# Patient Record
Sex: FEMALE | Race: WHITE | NOT HISPANIC OR LATINO | Employment: OTHER | URBAN - METROPOLITAN AREA
[De-identification: names, ages, dates, MRNs, and addresses within clinical notes are randomized per-mention and may not be internally consistent; named-entity substitution may affect disease eponyms.]

---

## 2017-02-28 ENCOUNTER — HOSPITAL ENCOUNTER (OUTPATIENT)
Dept: LAB | Facility: MEDICAL CENTER | Age: 82
End: 2017-02-28
Attending: FAMILY MEDICINE
Payer: COMMERCIAL

## 2017-02-28 LAB
25(OH)D3 SERPL-MCNC: 50 NG/ML (ref 30–100)
ALBUMIN SERPL BCP-MCNC: 4.1 G/DL (ref 3.2–4.9)
ALBUMIN/GLOB SERPL: 1.5 G/DL
ALP SERPL-CCNC: 49 U/L (ref 30–99)
ALT SERPL-CCNC: 11 U/L (ref 2–50)
AMORPHOUS CRYSTALS 1764: PRESENT /HPF
ANION GAP SERPL CALC-SCNC: 7 MMOL/L (ref 0–11.9)
APPEARANCE UR: ABNORMAL
AST SERPL-CCNC: 22 U/L (ref 12–45)
BASOPHILS # BLD AUTO: 0.02 K/UL (ref 0–0.12)
BASOPHILS NFR BLD AUTO: 0.3 % (ref 0–1.8)
BILIRUB SERPL-MCNC: 0.8 MG/DL (ref 0.1–1.5)
BILIRUB UR QL STRIP.AUTO: NEGATIVE
BUN SERPL-MCNC: 16 MG/DL (ref 8–22)
CALCIUM SERPL-MCNC: 9.8 MG/DL (ref 8.5–10.5)
CHLORIDE SERPL-SCNC: 100 MMOL/L (ref 96–112)
CHOLEST SERPL-MCNC: 169 MG/DL (ref 100–199)
CO2 SERPL-SCNC: 29 MMOL/L (ref 20–33)
COLOR UR AUTO: ABNORMAL
CREAT SERPL-MCNC: 0.75 MG/DL (ref 0.5–1.4)
EOSINOPHIL # BLD: 0.13 K/UL (ref 0–0.51)
EOSINOPHIL NFR BLD AUTO: 2.2 % (ref 0–6.9)
EPITHELIAL CELLS 1715: NORMAL /HPF
ERYTHROCYTE [DISTWIDTH] IN BLOOD BY AUTOMATED COUNT: 44.6 FL (ref 35.9–50)
GLOBULIN SER CALC-MCNC: 2.7 G/DL (ref 1.9–3.5)
GLUCOSE SERPL-MCNC: 98 MG/DL (ref 65–99)
GLUCOSE UR STRIP.AUTO-MCNC: NEGATIVE MG/DL
HCT VFR BLD AUTO: 49.2 % (ref 37–47)
HDLC SERPL-MCNC: 74 MG/DL
HGB BLD-MCNC: 16.4 G/DL (ref 12–16)
IMM GRANULOCYTES # BLD AUTO: 0.01 K/UL (ref 0–0.11)
IMM GRANULOCYTES NFR BLD AUTO: 0.2 % (ref 0–0.9)
KETONES UR STRIP.AUTO-MCNC: NEGATIVE MG/DL
LDLC SERPL CALC-MCNC: 82 MG/DL
LEUKOCYTE ESTERASE UR QL STRIP.AUTO: NEGATIVE
LYMPHOCYTES # BLD: 2.59 K/UL (ref 1–4.8)
LYMPHOCYTES NFR BLD AUTO: 43.7 % (ref 22–41)
MCH RBC QN AUTO: 30.1 PG (ref 27–33)
MCHC RBC AUTO-ENTMCNC: 33.3 G/DL (ref 33.6–35)
MCV RBC AUTO: 90.3 FL (ref 81.4–97.8)
MICRO URNS: ABNORMAL
MONOCYTES # BLD: 0.68 K/UL (ref 0–0.85)
MONOCYTES NFR BLD AUTO: 11.5 % (ref 0–13.4)
NEUTROPHILS # BLD: 2.5 K/UL (ref 2–7.15)
NEUTROPHILS NFR BLD AUTO: 42.1 % (ref 44–72)
NITRITE UR QL STRIP.AUTO: NEGATIVE
NRBC # BLD AUTO: 0 K/UL
NRBC BLD-RTO: 0 /100 WBC
PH UR: 7.5 [PH]
PLATELET # BLD AUTO: 192 K/UL (ref 164–446)
PMV BLD AUTO: 9.8 FL (ref 9–12.9)
POTASSIUM SERPL-SCNC: 4.1 MMOL/L (ref 3.6–5.5)
PROT SERPL-MCNC: 6.8 G/DL (ref 6–8.2)
PROT UR QL STRIP: NEGATIVE MG/DL
RBC # BLD AUTO: 5.45 M/UL (ref 4.2–5.4)
RBC UR QL AUTO: NEGATIVE
SODIUM SERPL-SCNC: 136 MMOL/L (ref 135–145)
SP GR UR STRIP.AUTO: 1.01
TRIGL SERPL-MCNC: 67 MG/DL (ref 0–149)
WBC # BLD AUTO: 5.9 K/UL (ref 4.8–10.8)

## 2017-02-28 PROCEDURE — 80053 COMPREHEN METABOLIC PANEL: CPT

## 2017-02-28 PROCEDURE — 81001 URINALYSIS AUTO W/SCOPE: CPT

## 2017-02-28 PROCEDURE — 82306 VITAMIN D 25 HYDROXY: CPT

## 2017-02-28 PROCEDURE — 85025 COMPLETE CBC W/AUTO DIFF WBC: CPT

## 2017-02-28 PROCEDURE — 36415 COLL VENOUS BLD VENIPUNCTURE: CPT

## 2017-02-28 PROCEDURE — 80061 LIPID PANEL: CPT

## 2017-03-01 ENCOUNTER — HOSPITAL ENCOUNTER (OUTPATIENT)
Facility: MEDICAL CENTER | Age: 82
End: 2017-03-01
Attending: FAMILY MEDICINE
Payer: COMMERCIAL

## 2017-03-01 LAB — HEMOCCULT STL QL: NEGATIVE

## 2017-03-01 PROCEDURE — 82272 OCCULT BLD FECES 1-3 TESTS: CPT

## 2017-08-02 ENCOUNTER — HOSPITAL ENCOUNTER (OUTPATIENT)
Dept: RADIOLOGY | Facility: MEDICAL CENTER | Age: 82
End: 2017-08-02
Attending: FAMILY MEDICINE
Payer: COMMERCIAL

## 2017-08-02 DIAGNOSIS — Z12.31 VISIT FOR SCREENING MAMMOGRAM: ICD-10-CM

## 2017-08-02 PROCEDURE — 77063 BREAST TOMOSYNTHESIS BI: CPT

## 2017-09-11 ENCOUNTER — HOSPITAL ENCOUNTER (OUTPATIENT)
Dept: LAB | Facility: MEDICAL CENTER | Age: 82
End: 2017-09-11
Attending: FAMILY MEDICINE
Payer: COMMERCIAL

## 2017-09-11 PROCEDURE — 87086 URINE CULTURE/COLONY COUNT: CPT

## 2017-09-11 PROCEDURE — 80053 COMPREHEN METABOLIC PANEL: CPT

## 2017-09-11 PROCEDURE — 81001 URINALYSIS AUTO W/SCOPE: CPT

## 2017-09-11 PROCEDURE — 36415 COLL VENOUS BLD VENIPUNCTURE: CPT

## 2017-09-12 LAB
ALBUMIN SERPL BCP-MCNC: 4.4 G/DL (ref 3.2–4.9)
ALBUMIN/GLOB SERPL: 1.6 G/DL
ALP SERPL-CCNC: 47 U/L (ref 30–99)
ALT SERPL-CCNC: 18 U/L (ref 2–50)
ANION GAP SERPL CALC-SCNC: 11 MMOL/L (ref 0–11.9)
APPEARANCE UR: CLEAR
AST SERPL-CCNC: 30 U/L (ref 12–45)
BACTERIA #/AREA URNS HPF: NEGATIVE /HPF
BILIRUB SERPL-MCNC: 0.6 MG/DL (ref 0.1–1.5)
BILIRUB UR QL STRIP.AUTO: NEGATIVE
BUN SERPL-MCNC: 16 MG/DL (ref 8–22)
CALCIUM SERPL-MCNC: 9.4 MG/DL (ref 8.5–10.5)
CHLORIDE SERPL-SCNC: 87 MMOL/L (ref 96–112)
CO2 SERPL-SCNC: 22 MMOL/L (ref 20–33)
COLOR UR: YELLOW
CREAT SERPL-MCNC: 1.01 MG/DL (ref 0.5–1.4)
EPI CELLS #/AREA URNS HPF: ABNORMAL /HPF
GFR SERPL CREATININE-BSD FRML MDRD: 52 ML/MIN/1.73 M 2
GLOBULIN SER CALC-MCNC: 2.7 G/DL (ref 1.9–3.5)
GLUCOSE SERPL-MCNC: 106 MG/DL (ref 65–99)
GLUCOSE UR STRIP.AUTO-MCNC: NEGATIVE MG/DL
HYALINE CASTS #/AREA URNS LPF: ABNORMAL /LPF
KETONES UR STRIP.AUTO-MCNC: 100 MG/DL
LEUKOCYTE ESTERASE UR QL STRIP.AUTO: ABNORMAL
MICRO URNS: ABNORMAL
NITRITE UR QL STRIP.AUTO: NEGATIVE
PH UR STRIP.AUTO: 6 [PH]
POTASSIUM SERPL-SCNC: 4.2 MMOL/L (ref 3.6–5.5)
PROT SERPL-MCNC: 7.1 G/DL (ref 6–8.2)
PROT UR QL STRIP: 100 MG/DL
RBC UR QL AUTO: NEGATIVE
RENAL EPI CELLS #/AREA URNS HPF: ABNORMAL /HPF
SODIUM SERPL-SCNC: 120 MMOL/L (ref 135–145)
SP GR UR STRIP.AUTO: 1.01
UROBILINOGEN UR STRIP.AUTO-MCNC: NORMAL MG/DL
WBC #/AREA URNS HPF: ABNORMAL /HPF

## 2017-09-14 LAB
BACTERIA UR CULT: NORMAL
SIGNIFICANT IND 70042: NORMAL
SITE SITE: NORMAL
SOURCE SOURCE: NORMAL

## 2017-09-18 ENCOUNTER — HOSPITAL ENCOUNTER (OUTPATIENT)
Dept: LAB | Facility: MEDICAL CENTER | Age: 82
End: 2017-09-18
Attending: NURSE PRACTITIONER
Payer: COMMERCIAL

## 2017-09-18 PROCEDURE — 80048 BASIC METABOLIC PNL TOTAL CA: CPT

## 2017-09-18 PROCEDURE — 36415 COLL VENOUS BLD VENIPUNCTURE: CPT

## 2017-09-19 LAB
ANION GAP SERPL CALC-SCNC: 4 MMOL/L (ref 0–11.9)
BUN SERPL-MCNC: 14 MG/DL (ref 8–22)
CALCIUM SERPL-MCNC: 10.2 MG/DL (ref 8.5–10.5)
CHLORIDE SERPL-SCNC: 95 MMOL/L (ref 96–112)
CO2 SERPL-SCNC: 32 MMOL/L (ref 20–33)
CREAT SERPL-MCNC: 0.7 MG/DL (ref 0.5–1.4)
GFR SERPL CREATININE-BSD FRML MDRD: >60 ML/MIN/1.73 M 2
GLUCOSE SERPL-MCNC: 106 MG/DL (ref 65–99)
POTASSIUM SERPL-SCNC: 4.2 MMOL/L (ref 3.6–5.5)
SODIUM SERPL-SCNC: 131 MMOL/L (ref 135–145)

## 2017-12-28 ENCOUNTER — HOSPITAL ENCOUNTER (OUTPATIENT)
Dept: LAB | Facility: MEDICAL CENTER | Age: 82
End: 2017-12-28
Attending: NURSE PRACTITIONER
Payer: COMMERCIAL

## 2017-12-28 LAB
ALBUMIN SERPL BCP-MCNC: 4 G/DL (ref 3.2–4.9)
ALBUMIN/GLOB SERPL: 1.5 G/DL
ALP SERPL-CCNC: 51 U/L (ref 30–99)
ALT SERPL-CCNC: 14 U/L (ref 2–50)
ANION GAP SERPL CALC-SCNC: 4 MMOL/L (ref 0–11.9)
AST SERPL-CCNC: 33 U/L (ref 12–45)
BASOPHILS # BLD AUTO: 0.5 % (ref 0–1.8)
BASOPHILS # BLD: 0.03 K/UL (ref 0–0.12)
BILIRUB SERPL-MCNC: 0.9 MG/DL (ref 0.1–1.5)
BUN SERPL-MCNC: 12 MG/DL (ref 8–22)
CALCIUM SERPL-MCNC: 9.5 MG/DL (ref 8.5–10.5)
CHLORIDE SERPL-SCNC: 101 MMOL/L (ref 96–112)
CHOLEST SERPL-MCNC: 143 MG/DL (ref 100–199)
CO2 SERPL-SCNC: 29 MMOL/L (ref 20–33)
CREAT SERPL-MCNC: 0.66 MG/DL (ref 0.5–1.4)
EOSINOPHIL # BLD AUTO: 0.11 K/UL (ref 0–0.51)
EOSINOPHIL NFR BLD: 2 % (ref 0–6.9)
ERYTHROCYTE [DISTWIDTH] IN BLOOD BY AUTOMATED COUNT: 47.2 FL (ref 35.9–50)
GFR SERPL CREATININE-BSD FRML MDRD: >60 ML/MIN/1.73 M 2
GLOBULIN SER CALC-MCNC: 2.7 G/DL (ref 1.9–3.5)
GLUCOSE SERPL-MCNC: 89 MG/DL (ref 65–99)
HCT VFR BLD AUTO: 51.6 % (ref 37–47)
HDLC SERPL-MCNC: 69 MG/DL
HGB BLD-MCNC: 16.9 G/DL (ref 12–16)
IMM GRANULOCYTES # BLD AUTO: 0.01 K/UL (ref 0–0.11)
IMM GRANULOCYTES NFR BLD AUTO: 0.2 % (ref 0–0.9)
LDLC SERPL CALC-MCNC: 61 MG/DL
LYMPHOCYTES # BLD AUTO: 2.13 K/UL (ref 1–4.8)
LYMPHOCYTES NFR BLD: 39 % (ref 22–41)
MCH RBC QN AUTO: 30.9 PG (ref 27–33)
MCHC RBC AUTO-ENTMCNC: 32.8 G/DL (ref 33.6–35)
MCV RBC AUTO: 94.3 FL (ref 81.4–97.8)
MONOCYTES # BLD AUTO: 0.55 K/UL (ref 0–0.85)
MONOCYTES NFR BLD AUTO: 10.1 % (ref 0–13.4)
NEUTROPHILS # BLD AUTO: 2.63 K/UL (ref 2–7.15)
NEUTROPHILS NFR BLD: 48.2 % (ref 44–72)
NRBC # BLD AUTO: 0 K/UL
NRBC BLD-RTO: 0 /100 WBC
PLATELET # BLD AUTO: 154 K/UL (ref 164–446)
PMV BLD AUTO: 10.2 FL (ref 9–12.9)
POTASSIUM SERPL-SCNC: 4.1 MMOL/L (ref 3.6–5.5)
PROT SERPL-MCNC: 6.7 G/DL (ref 6–8.2)
RBC # BLD AUTO: 5.47 M/UL (ref 4.2–5.4)
SODIUM SERPL-SCNC: 134 MMOL/L (ref 135–145)
TRIGL SERPL-MCNC: 63 MG/DL (ref 0–149)
WBC # BLD AUTO: 5.5 K/UL (ref 4.8–10.8)

## 2017-12-28 PROCEDURE — 36415 COLL VENOUS BLD VENIPUNCTURE: CPT

## 2017-12-28 PROCEDURE — 80061 LIPID PANEL: CPT

## 2017-12-28 PROCEDURE — 85025 COMPLETE CBC W/AUTO DIFF WBC: CPT

## 2017-12-28 PROCEDURE — 80053 COMPREHEN METABOLIC PANEL: CPT

## 2018-04-26 ENCOUNTER — HOSPITAL ENCOUNTER (EMERGENCY)
Facility: MEDICAL CENTER | Age: 83
End: 2018-04-26
Attending: EMERGENCY MEDICINE
Payer: COMMERCIAL

## 2018-04-26 ENCOUNTER — OFFICE VISIT (OUTPATIENT)
Dept: URGENT CARE | Facility: CLINIC | Age: 83
End: 2018-04-26
Payer: COMMERCIAL

## 2018-04-26 VITALS
WEIGHT: 109 LBS | TEMPERATURE: 99.3 F | HEIGHT: 65 IN | DIASTOLIC BLOOD PRESSURE: 80 MMHG | HEART RATE: 92 BPM | RESPIRATION RATE: 18 BRPM | SYSTOLIC BLOOD PRESSURE: 140 MMHG | OXYGEN SATURATION: 92 % | BODY MASS INDEX: 18.16 KG/M2

## 2018-04-26 VITALS
TEMPERATURE: 99.6 F | BODY MASS INDEX: 18.26 KG/M2 | HEART RATE: 73 BPM | WEIGHT: 109.57 LBS | OXYGEN SATURATION: 93 % | SYSTOLIC BLOOD PRESSURE: 169 MMHG | HEIGHT: 65 IN | RESPIRATION RATE: 16 BRPM | DIASTOLIC BLOOD PRESSURE: 92 MMHG

## 2018-04-26 DIAGNOSIS — R04.0 EPISTAXIS: ICD-10-CM

## 2018-04-26 LAB
BASOPHILS # BLD AUTO: 0.3 % (ref 0–1.8)
BASOPHILS # BLD: 0.02 K/UL (ref 0–0.12)
EOSINOPHIL # BLD AUTO: 0.1 K/UL (ref 0–0.51)
EOSINOPHIL NFR BLD: 1.5 % (ref 0–6.9)
ERYTHROCYTE [DISTWIDTH] IN BLOOD BY AUTOMATED COUNT: 45.9 FL (ref 35.9–50)
HCT VFR BLD AUTO: 45.8 % (ref 37–47)
HGB BLD-MCNC: 15.8 G/DL (ref 12–16)
IMM GRANULOCYTES # BLD AUTO: 0.01 K/UL (ref 0–0.11)
IMM GRANULOCYTES NFR BLD AUTO: 0.1 % (ref 0–0.9)
INR PPP: 1.08 (ref 0.87–1.13)
LYMPHOCYTES # BLD AUTO: 2.1 K/UL (ref 1–4.8)
LYMPHOCYTES NFR BLD: 31.2 % (ref 22–41)
MCH RBC QN AUTO: 31 PG (ref 27–33)
MCHC RBC AUTO-ENTMCNC: 34.5 G/DL (ref 33.6–35)
MCV RBC AUTO: 89.8 FL (ref 81.4–97.8)
MONOCYTES # BLD AUTO: 0.76 K/UL (ref 0–0.85)
MONOCYTES NFR BLD AUTO: 11.3 % (ref 0–13.4)
NEUTROPHILS # BLD AUTO: 3.74 K/UL (ref 2–7.15)
NEUTROPHILS NFR BLD: 55.6 % (ref 44–72)
NRBC # BLD AUTO: 0 K/UL
NRBC BLD-RTO: 0 /100 WBC
PLATELET # BLD AUTO: 189 K/UL (ref 164–446)
PMV BLD AUTO: 9.7 FL (ref 9–12.9)
PROTHROMBIN TIME: 13.9 SEC (ref 12–14.6)
RBC # BLD AUTO: 5.1 M/UL (ref 4.2–5.4)
WBC # BLD AUTO: 6.7 K/UL (ref 4.8–10.8)

## 2018-04-26 PROCEDURE — 700101 HCHG RX REV CODE 250: Performed by: EMERGENCY MEDICINE

## 2018-04-26 PROCEDURE — 85025 COMPLETE CBC W/AUTO DIFF WBC: CPT

## 2018-04-26 PROCEDURE — 303620 HCHG EPISTAXIS CONTROL

## 2018-04-26 PROCEDURE — 99284 EMERGENCY DEPT VISIT MOD MDM: CPT

## 2018-04-26 PROCEDURE — 700102 HCHG RX REV CODE 250 W/ 637 OVERRIDE(OP): Performed by: EMERGENCY MEDICINE

## 2018-04-26 PROCEDURE — 99214 OFFICE O/P EST MOD 30 MIN: CPT | Performed by: NURSE PRACTITIONER

## 2018-04-26 PROCEDURE — 85610 PROTHROMBIN TIME: CPT

## 2018-04-26 RX ORDER — AZITHROMYCIN 250 MG/1
TABLET, FILM COATED ORAL
Qty: 6 TAB | Refills: 0 | Status: ON HOLD | OUTPATIENT
Start: 2018-04-26 | End: 2018-05-23

## 2018-04-26 RX ORDER — ASPIRIN 81 MG/1
81 TABLET, CHEWABLE ORAL DAILY
Status: ON HOLD | COMMUNITY
End: 2018-05-23

## 2018-04-26 RX ORDER — OXYMETAZOLINE HYDROCHLORIDE 0.05 G/100ML
2 SPRAY NASAL ONCE
Status: COMPLETED | OUTPATIENT
Start: 2018-04-26 | End: 2018-04-26

## 2018-04-26 RX ORDER — LOVASTATIN 10 MG/1
10 TABLET ORAL NIGHTLY
COMMUNITY

## 2018-04-26 RX ORDER — BENAZEPRIL HYDROCHLORIDE 20 MG/1
20 TABLET ORAL DAILY
Status: ON HOLD | COMMUNITY
End: 2018-05-23

## 2018-04-26 RX ORDER — RISEDRONATE SODIUM 35 MG/1
35 TABLET, FILM COATED ORAL
COMMUNITY
End: 2019-08-14

## 2018-04-26 RX ORDER — EPINEPHRINE NASAL SOLUTION 1 MG/ML
SOLUTION NASAL
Status: DISCONTINUED
Start: 2018-04-26 | End: 2018-04-26 | Stop reason: HOSPADM

## 2018-04-26 RX ADMIN — LIDOCAINE HYDROCHLORIDE 2 %: 20 JELLY TOPICAL at 13:08

## 2018-04-26 RX ADMIN — OXYMETAZOLINE HYDROCHLORIDE 2 SPRAY: 5 SPRAY NASAL at 13:08

## 2018-04-26 ASSESSMENT — PAIN SCALES - GENERAL: PAINLEVEL_OUTOF10: 0

## 2018-04-26 ASSESSMENT — ENCOUNTER SYMPTOMS
FEVER: 0
VOMITING: 0
SHORTNESS OF BREATH: 0
EYE PAIN: 0
SORE THROAT: 0
DIZZINESS: 0
MYALGIAS: 0
NAUSEA: 0
CHILLS: 0

## 2018-04-26 NOTE — DISCHARGE INSTRUCTIONS
Nosebleed  Nosebleeds are common. A nosebleed can be caused by many things, including:  · Getting hit hard in the nose.  · Infections.  · Dryness in your nose.  · A dry climate.  · Medicines.  · Picking your nose.  · Your home heating and cooling systems.  HOME CARE   · Try controlling your nosebleed by pinching your nostrils gently. Do this for at least 10 minutes.  · Avoid blowing or sniffing your nose for a number of hours after having a nosebleed.  · Do not put gauze inside of your nose yourself. If your nose was packed by your doctor, try to keep the pack inside of your nose until your doctor removes it.  ¨ If a gauze pack was used and it starts to fall out, gently replace it or cut off the end of it.  ¨ If a balloon catheter was used to pack your nose, do not cut or remove it unless told by your doctor.  · Avoid lying down while you are having a nosebleed. Sit up and lean forward.  · Use a nasal spray decongestant to help with a nosebleed as told by your doctor.  · Do not use petroleum jelly or mineral oil in your nose. These can drip into your lungs.  · Keep your house humid by using:  ¨ Less air conditioning.  ¨ A humidifier.  · Aspirin and blood thinners make bleeding more likely. If you are prescribed these medicines and you have nosebleeds, ask your doctor if you should stop taking the medicines or adjust the dose. Do not stop medicines unless told by your doctor.  · Resume your normal activities as you are able. Avoid straining, lifting, or bending at your waist for several days.  · If your nosebleed was caused by dryness in your nose, use over-the-counter saline nasal spray or gel. If you must use a lubricant:  ¨ Choose one that is water-soluble.  ¨ Use it only as needed.  ¨ Do not use it within several hours of lying down.  · Keep all follow-up visits as told by your doctor. This is important.  GET HELP IF:  · You have a fever.  · You get frequent nosebleeds.  · You are getting nosebleeds more  often.  GET HELP RIGHT AWAY IF:  · Your nosebleed lasts longer than 20 minutes.  · Your nosebleed occurs after an injury to your face, and your nose looks crooked or broken.  · You have unusual bleeding from other parts of your body.  · You have unusual bruising on other parts of your body.  · You feel light-headed or dizzy.  · You become sweaty.  · You throw up (vomit) blood.  · You have a nosebleed after a head injury.  This information is not intended to replace advice given to you by your health care provider. Make sure you discuss any questions you have with your health care provider.  Document Released: 09/26/2009 Document Revised: 01/08/2016 Document Reviewed: 08/03/2015  [employer] Patient Education © 2017 [employer]      PACKING OUT IN 3-5 DAYS

## 2018-04-26 NOTE — ED PROVIDER NOTES
"ED Provider Note    CHIEF COMPLAINT  Chief Complaint   Patient presents with   • Epistaxis     Atraumatic rt sided epistaxis began at 0700 today. Pt was seen at , packing placed but bleeding never entirely stopped. Pt sent to ER from .        HPI  Diana Tristan is a 85 y.o. female who presents to emergency room today sent in by urgent care for uncontrolled bleeding. Patient states she woke up this morning with bleeding to her right nare around 7 PM denies trauma to the area. Went to urgent care clinic there unable to stop it still is symptomatic emergency room for evaluation. No nausea no vomiting no fever chills or change to bowel or bladder. She is not on any blood thinner other than aspirin patient states she will hold this.    REVIEW OF SYSTEMS  See HPI for further details. All other systems are negative.      PAST MEDICAL HISTORY  No past medical history on file.    FAMILY HISTORY  No family history on file.    SOCIAL HISTORY  Social History     Social History   • Marital status:      Spouse name: N/A   • Number of children: N/A   • Years of education: N/A     Social History Main Topics   • Smoking status: Former Smoker     Quit date: 1/26/2015   • Smokeless tobacco: Never Used   • Alcohol use Not on file   • Drug use: Unknown   • Sexual activity: Not on file     Other Topics Concern   • Not on file     Social History Narrative   • No narrative on file       SURGICAL HISTORY  Past Surgical History:   Procedure Laterality Date   • US-CYST ASPIRATION-BREAST INITIAL         CURRENT MEDICATIONS  Home Medications    **Home medications have not yet been reviewed for this encounter**         ALLERGIES  Allergies   Allergen Reactions   • Augmentin [Amoxicillin-Pot Clavulanate] Hives   • Bactrim [Sulfamethoxazole W-Trimethoprim] Hives   • Codeine Vomiting   • Voltaren [Diclofenac] Swelling       PHYSICAL EXAM  VITAL SIGNS: BP (!) 169/92   Pulse 73   Temp 37.6 °C (99.6 °F)   Resp 16   Ht 1.651 m (5' 5\")   " Wt 49.7 kg (109 lb 9.1 oz)   SpO2 93%   BMI 18.23 kg/m²  Room air O2: 93    Constitutional :  Well developed, Well nourished, No acute distress, Non-toxic appearance.   HENT: Left and right nare packing in place for continued bleeding this was removed.  Eyes: perrla  Neck: Normal range of motion, No tenderness, Supple, No stridor.   Lymphatic: None noted.   Cardiovascular: Normal heart rate, Normal rhythm, No murmurs, No rubs, No gallops.   Thorax & Lungs: Clear to auscultation  Skin: Warm, Dry, No erythema, No rash.   Extremities: Intact distal pulses, No edema, No tenderness, No cyanosis, No clubbing.         COURSE & MEDICAL DECISION MAKING  Pertinent Labs & Imaging studies reviewed. (See chart for details) patient was kept and observed for approximately one hour there is no further bleeding after packing placed tolerated procedure well. Will be given referral to ENT Dr. Loredo for follow-up placed on antibiotics of Zithromax. Packing to be removed in 3-5 days. Return if further worsening symptoms discontinue aspirin until further notice.          PROCEDURE NOTE: Nasal packing placed by ER physician; patient had MURACELL packing placed by ER physician to the right nare after pretreating with Gumaro-Synephrine. She had Xylocaine jelly added to this. She tolerated the procedure well there is no further bleeding.        FINAL IMPRESSION  1. Acute anterior epistaxis  2.   3.      Electronically signed by: Toribio Alonzo, 4/26/2018

## 2018-04-26 NOTE — ED NOTES
Chief Complaint   Patient presents with   • Epistaxis     Atraumatic rt sided epistaxis began at 0700 today. Pt was seen at , packing placed but bleeding never entirely stopped. Pt sent to ER from .    Takes ASA 2 x weekly.

## 2018-04-26 NOTE — ED NOTES
Discharge instructions provided.  Rx x1 given and pt educated on how and when to take medication, Pt verbalized the understanding of discharge instructions to follow up with PCP and to return to ER if condition worsens.  Pt ambulated out of ER without difficulty.

## 2018-05-23 ENCOUNTER — HOSPITAL ENCOUNTER (OUTPATIENT)
Facility: MEDICAL CENTER | Age: 83
End: 2018-05-23
Attending: EMERGENCY MEDICINE | Admitting: HOSPITALIST
Payer: COMMERCIAL

## 2018-05-23 ENCOUNTER — APPOINTMENT (OUTPATIENT)
Dept: RADIOLOGY | Facility: MEDICAL CENTER | Age: 83
End: 2018-05-23
Attending: HOSPITALIST
Payer: COMMERCIAL

## 2018-05-23 ENCOUNTER — APPOINTMENT (OUTPATIENT)
Dept: RADIOLOGY | Facility: MEDICAL CENTER | Age: 83
End: 2018-05-23
Attending: EMERGENCY MEDICINE
Payer: COMMERCIAL

## 2018-05-23 VITALS
TEMPERATURE: 98.2 F | RESPIRATION RATE: 18 BRPM | DIASTOLIC BLOOD PRESSURE: 58 MMHG | OXYGEN SATURATION: 93 % | HEART RATE: 77 BPM | BODY MASS INDEX: 17.78 KG/M2 | WEIGHT: 106.7 LBS | HEIGHT: 65 IN | SYSTOLIC BLOOD PRESSURE: 147 MMHG

## 2018-05-23 DIAGNOSIS — J18.9 PNEUMONIA OF LEFT LOWER LOBE DUE TO INFECTIOUS ORGANISM: ICD-10-CM

## 2018-05-23 DIAGNOSIS — I10 HYPERTENSION, UNSPECIFIED TYPE: ICD-10-CM

## 2018-05-23 DIAGNOSIS — R07.9 CHEST PAIN, UNSPECIFIED TYPE: ICD-10-CM

## 2018-05-23 PROBLEM — E78.5 HYPERLIPIDEMIA: Status: ACTIVE | Noted: 2018-05-23

## 2018-05-23 PROBLEM — I16.0 HYPERTENSIVE URGENCY: Status: RESOLVED | Noted: 2018-05-23 | Resolved: 2018-05-23

## 2018-05-23 PROBLEM — R93.89 CHEST X-RAY ABNORMALITY: Status: ACTIVE | Noted: 2018-05-23

## 2018-05-23 PROBLEM — I16.0 HYPERTENSIVE URGENCY: Status: ACTIVE | Noted: 2018-05-23

## 2018-05-23 PROBLEM — J44.9 COPD (CHRONIC OBSTRUCTIVE PULMONARY DISEASE) (HCC): Status: ACTIVE | Noted: 2018-05-23

## 2018-05-23 LAB
ANION GAP SERPL CALC-SCNC: 7 MMOL/L (ref 0–11.9)
BASOPHILS # BLD AUTO: 0.4 % (ref 0–1.8)
BASOPHILS # BLD: 0.02 K/UL (ref 0–0.12)
BUN SERPL-MCNC: 13 MG/DL (ref 8–22)
CALCIUM SERPL-MCNC: 10.3 MG/DL (ref 8.5–10.5)
CHLORIDE SERPL-SCNC: 103 MMOL/L (ref 96–112)
CO2 SERPL-SCNC: 28 MMOL/L (ref 20–33)
CREAT SERPL-MCNC: 0.63 MG/DL (ref 0.5–1.4)
EKG IMPRESSION: NORMAL
EKG IMPRESSION: NORMAL
EOSINOPHIL # BLD AUTO: 0.12 K/UL (ref 0–0.51)
EOSINOPHIL NFR BLD: 2.3 % (ref 0–6.9)
ERYTHROCYTE [DISTWIDTH] IN BLOOD BY AUTOMATED COUNT: 45.5 FL (ref 35.9–50)
EST. AVERAGE GLUCOSE BLD GHB EST-MCNC: 120 MG/DL
GLUCOSE SERPL-MCNC: 87 MG/DL (ref 65–99)
HBA1C MFR BLD: 5.8 % (ref 0–5.6)
HCT VFR BLD AUTO: 46.5 % (ref 37–47)
HGB BLD-MCNC: 15.6 G/DL (ref 12–16)
IMM GRANULOCYTES # BLD AUTO: 0.01 K/UL (ref 0–0.11)
IMM GRANULOCYTES NFR BLD AUTO: 0.2 % (ref 0–0.9)
LYMPHOCYTES # BLD AUTO: 1.45 K/UL (ref 1–4.8)
LYMPHOCYTES NFR BLD: 27.4 % (ref 22–41)
MCH RBC QN AUTO: 30 PG (ref 27–33)
MCHC RBC AUTO-ENTMCNC: 33.5 G/DL (ref 33.6–35)
MCV RBC AUTO: 89.4 FL (ref 81.4–97.8)
MONOCYTES # BLD AUTO: 0.64 K/UL (ref 0–0.85)
MONOCYTES NFR BLD AUTO: 12.1 % (ref 0–13.4)
NEUTROPHILS # BLD AUTO: 3.06 K/UL (ref 2–7.15)
NEUTROPHILS NFR BLD: 57.6 % (ref 44–72)
NRBC # BLD AUTO: 0 K/UL
NRBC BLD-RTO: 0 /100 WBC
PLATELET # BLD AUTO: 169 K/UL (ref 164–446)
PMV BLD AUTO: 9.6 FL (ref 9–12.9)
POTASSIUM SERPL-SCNC: 3.6 MMOL/L (ref 3.6–5.5)
PROCALCITONIN SERPL-MCNC: <0.05 NG/ML
RBC # BLD AUTO: 5.2 M/UL (ref 4.2–5.4)
SODIUM SERPL-SCNC: 138 MMOL/L (ref 135–145)
TROPONIN I SERPL-MCNC: 0.01 NG/ML (ref 0–0.04)
TROPONIN I SERPL-MCNC: 0.02 NG/ML (ref 0–0.04)
TROPONIN I SERPL-MCNC: 0.02 NG/ML (ref 0–0.04)
TSH SERPL DL<=0.005 MIU/L-ACNC: 1.3 UIU/ML (ref 0.38–5.33)
WBC # BLD AUTO: 5.3 K/UL (ref 4.8–10.8)

## 2018-05-23 PROCEDURE — 700102 HCHG RX REV CODE 250 W/ 637 OVERRIDE(OP): Performed by: HOSPITALIST

## 2018-05-23 PROCEDURE — 71045 X-RAY EXAM CHEST 1 VIEW: CPT

## 2018-05-23 PROCEDURE — 36415 COLL VENOUS BLD VENIPUNCTURE: CPT

## 2018-05-23 PROCEDURE — A9270 NON-COVERED ITEM OR SERVICE: HCPCS | Performed by: HOSPITALIST

## 2018-05-23 PROCEDURE — 84484 ASSAY OF TROPONIN QUANT: CPT | Mod: 91

## 2018-05-23 PROCEDURE — 93005 ELECTROCARDIOGRAM TRACING: CPT | Performed by: HOSPITALIST

## 2018-05-23 PROCEDURE — 94760 N-INVAS EAR/PLS OXIMETRY 1: CPT

## 2018-05-23 PROCEDURE — 700111 HCHG RX REV CODE 636 W/ 250 OVERRIDE (IP): Performed by: HOSPITALIST

## 2018-05-23 PROCEDURE — 90471 IMMUNIZATION ADMIN: CPT

## 2018-05-23 PROCEDURE — 96372 THER/PROPH/DIAG INJ SC/IM: CPT

## 2018-05-23 PROCEDURE — 83036 HEMOGLOBIN GLYCOSYLATED A1C: CPT

## 2018-05-23 PROCEDURE — 84443 ASSAY THYROID STIM HORMONE: CPT

## 2018-05-23 PROCEDURE — G0378 HOSPITAL OBSERVATION PER HR: HCPCS

## 2018-05-23 PROCEDURE — 99285 EMERGENCY DEPT VISIT HI MDM: CPT

## 2018-05-23 PROCEDURE — 84145 PROCALCITONIN (PCT): CPT

## 2018-05-23 PROCEDURE — 700111 HCHG RX REV CODE 636 W/ 250 OVERRIDE (IP): Performed by: EMERGENCY MEDICINE

## 2018-05-23 PROCEDURE — 96374 THER/PROPH/DIAG INJ IV PUSH: CPT

## 2018-05-23 PROCEDURE — 90670 PCV13 VACCINE IM: CPT | Performed by: HOSPITALIST

## 2018-05-23 PROCEDURE — 93005 ELECTROCARDIOGRAM TRACING: CPT | Performed by: EMERGENCY MEDICINE

## 2018-05-23 PROCEDURE — 80048 BASIC METABOLIC PNL TOTAL CA: CPT

## 2018-05-23 PROCEDURE — A9502 TC99M TETROFOSMIN: HCPCS

## 2018-05-23 PROCEDURE — 85025 COMPLETE CBC W/AUTO DIFF WBC: CPT

## 2018-05-23 PROCEDURE — 99236 HOSP IP/OBS SAME DATE HI 85: CPT | Performed by: HOSPITALIST

## 2018-05-23 PROCEDURE — 93010 ELECTROCARDIOGRAM REPORT: CPT | Performed by: INTERNAL MEDICINE

## 2018-05-23 PROCEDURE — 700111 HCHG RX REV CODE 636 W/ 250 OVERRIDE (IP)

## 2018-05-23 RX ORDER — ASPIRIN 300 MG/1
300 SUPPOSITORY RECTAL DAILY
Status: DISCONTINUED | OUTPATIENT
Start: 2018-05-23 | End: 2018-05-23 | Stop reason: HOSPADM

## 2018-05-23 RX ORDER — BENAZEPRIL HYDROCHLORIDE 20 MG/1
40 TABLET ORAL DAILY
Qty: 30 TAB | Refills: 3 | Status: SHIPPED | OUTPATIENT
Start: 2018-05-23 | End: 2018-05-23

## 2018-05-23 RX ORDER — OXYCODONE HYDROCHLORIDE 10 MG/1
10 TABLET ORAL
Status: DISCONTINUED | OUTPATIENT
Start: 2018-05-23 | End: 2018-05-23 | Stop reason: HOSPADM

## 2018-05-23 RX ORDER — BENAZEPRIL HYDROCHLORIDE 40 MG/1
40 TABLET ORAL DAILY
Qty: 30 TAB | Refills: 3 | Status: SHIPPED | OUTPATIENT
Start: 2018-05-23

## 2018-05-23 RX ORDER — ONDANSETRON 2 MG/ML
4 INJECTION INTRAMUSCULAR; INTRAVENOUS EVERY 4 HOURS PRN
Status: DISCONTINUED | OUTPATIENT
Start: 2018-05-23 | End: 2018-05-23 | Stop reason: HOSPADM

## 2018-05-23 RX ORDER — LOVASTATIN 20 MG/1
10 TABLET ORAL NIGHTLY
Status: DISCONTINUED | OUTPATIENT
Start: 2018-05-23 | End: 2018-05-23 | Stop reason: HOSPADM

## 2018-05-23 RX ORDER — AMOXICILLIN 250 MG
2 CAPSULE ORAL 2 TIMES DAILY
Status: DISCONTINUED | OUTPATIENT
Start: 2018-05-23 | End: 2018-05-23 | Stop reason: HOSPADM

## 2018-05-23 RX ORDER — POLYETHYLENE GLYCOL 3350 17 G/17G
1 POWDER, FOR SOLUTION ORAL
Status: DISCONTINUED | OUTPATIENT
Start: 2018-05-23 | End: 2018-05-23 | Stop reason: HOSPADM

## 2018-05-23 RX ORDER — HEPARIN SODIUM 5000 [USP'U]/ML
5000 INJECTION, SOLUTION INTRAVENOUS; SUBCUTANEOUS EVERY 8 HOURS
Status: DISCONTINUED | OUTPATIENT
Start: 2018-05-23 | End: 2018-05-23 | Stop reason: HOSPADM

## 2018-05-23 RX ORDER — BISACODYL 10 MG
10 SUPPOSITORY, RECTAL RECTAL
Status: DISCONTINUED | OUTPATIENT
Start: 2018-05-23 | End: 2018-05-23 | Stop reason: HOSPADM

## 2018-05-23 RX ORDER — BENAZEPRIL HYDROCHLORIDE 20 MG/1
40 TABLET ORAL DAILY
Status: DISCONTINUED | OUTPATIENT
Start: 2018-05-24 | End: 2018-05-23 | Stop reason: HOSPADM

## 2018-05-23 RX ORDER — CEFTRIAXONE 2 G/1
2 INJECTION, POWDER, FOR SOLUTION INTRAMUSCULAR; INTRAVENOUS ONCE
Status: COMPLETED | OUTPATIENT
Start: 2018-05-23 | End: 2018-05-23

## 2018-05-23 RX ORDER — HYDROCHLOROTHIAZIDE 25 MG/1
12.5 TABLET ORAL
Status: DISCONTINUED | OUTPATIENT
Start: 2018-05-23 | End: 2018-05-23

## 2018-05-23 RX ORDER — BENAZEPRIL HYDROCHLORIDE 20 MG/1
20 TABLET ORAL DAILY
Status: DISCONTINUED | OUTPATIENT
Start: 2018-05-23 | End: 2018-05-23

## 2018-05-23 RX ORDER — ASPIRIN 325 MG
325 TABLET ORAL DAILY
Status: DISCONTINUED | OUTPATIENT
Start: 2018-05-23 | End: 2018-05-23 | Stop reason: HOSPADM

## 2018-05-23 RX ORDER — HYDRALAZINE HYDROCHLORIDE 20 MG/ML
20 INJECTION INTRAMUSCULAR; INTRAVENOUS EVERY 6 HOURS PRN
Status: DISCONTINUED | OUTPATIENT
Start: 2018-05-23 | End: 2018-05-23 | Stop reason: HOSPADM

## 2018-05-23 RX ORDER — OXYCODONE HYDROCHLORIDE 5 MG/1
5 TABLET ORAL
Status: DISCONTINUED | OUTPATIENT
Start: 2018-05-23 | End: 2018-05-23 | Stop reason: HOSPADM

## 2018-05-23 RX ORDER — ONDANSETRON 4 MG/1
4 TABLET, ORALLY DISINTEGRATING ORAL EVERY 4 HOURS PRN
Status: DISCONTINUED | OUTPATIENT
Start: 2018-05-23 | End: 2018-05-23 | Stop reason: HOSPADM

## 2018-05-23 RX ORDER — REGADENOSON 0.08 MG/ML
INJECTION, SOLUTION INTRAVENOUS
Status: COMPLETED
Start: 2018-05-23 | End: 2018-05-23

## 2018-05-23 RX ORDER — ASPIRIN 81 MG/1
324 TABLET, CHEWABLE ORAL DAILY
Status: DISCONTINUED | OUTPATIENT
Start: 2018-05-23 | End: 2018-05-23 | Stop reason: HOSPADM

## 2018-05-23 RX ADMIN — REGADENOSON 0.4 MG: 0.08 INJECTION, SOLUTION INTRAVENOUS at 13:55

## 2018-05-23 RX ADMIN — ASPIRIN 325 MG: 325 TABLET ORAL at 05:23

## 2018-05-23 RX ADMIN — HEPARIN SODIUM 5000 UNITS: 5000 INJECTION, SOLUTION INTRAVENOUS; SUBCUTANEOUS at 15:20

## 2018-05-23 RX ADMIN — CEFTRIAXONE SODIUM 2 G: 2 INJECTION, POWDER, FOR SOLUTION INTRAMUSCULAR; INTRAVENOUS at 03:43

## 2018-05-23 RX ADMIN — HYDRALAZINE HYDROCHLORIDE 20 MG: 20 INJECTION INTRAMUSCULAR; INTRAVENOUS at 10:39

## 2018-05-23 RX ADMIN — PNEUMOCOCCAL 13-VALENT CONJUGATE VACCINE 0.5 ML: 2.2; 2.2; 2.2; 2.2; 2.2; 4.4; 2.2; 2.2; 2.2; 2.2; 2.2; 2.2; 2.2 INJECTION, SUSPENSION INTRAMUSCULAR at 05:23

## 2018-05-23 RX ADMIN — HEPARIN SODIUM 5000 UNITS: 5000 INJECTION, SOLUTION INTRAVENOUS; SUBCUTANEOUS at 05:22

## 2018-05-23 RX ADMIN — BENAZEPRIL HYDROCHLORIDE 20 MG: 20 TABLET, COATED ORAL at 05:22

## 2018-05-23 ASSESSMENT — ENCOUNTER SYMPTOMS
HALLUCINATIONS: 0
WEAKNESS: 0
FEVER: 0
BLURRED VISION: 0
HEARTBURN: 0
DEPRESSION: 0
FLANK PAIN: 0
CHILLS: 0
ABDOMINAL PAIN: 0
SHORTNESS OF BREATH: 0
EYE DISCHARGE: 0
VOMITING: 0
MYALGIAS: 0
HEMOPTYSIS: 0
DOUBLE VISION: 0
SPEECH CHANGE: 0
DIZZINESS: 0
COUGH: 0
FOCAL WEAKNESS: 0
BRUISES/BLEEDS EASILY: 0
NAUSEA: 0
SENSORY CHANGE: 0
PALPITATIONS: 0

## 2018-05-23 ASSESSMENT — LIFESTYLE VARIABLES
DO YOU DRINK ALCOHOL: NO
EVER_SMOKED: NEVER
EVER_SMOKED: NEVER
ALCOHOL_USE: NO
SUBSTANCE_ABUSE: 0

## 2018-05-23 ASSESSMENT — COPD QUESTIONNAIRES
COPD SCREENING SCORE: 3
DURING THE PAST 4 WEEKS HOW MUCH DID YOU FEEL SHORT OF BREATH: NONE/LITTLE OF THE TIME
DURING THE PAST 4 WEEKS HOW MUCH DID YOU FEEL SHORT OF BREATH: NONE/LITTLE OF THE TIME
DO YOU EVER COUGH UP ANY MUCUS OR PHLEGM?: YES, A FEW DAYS A WEEK OR MONTH
HAVE YOU SMOKED AT LEAST 100 CIGARETTES IN YOUR ENTIRE LIFE: NO/DON'T KNOW
COPD SCREENING SCORE: 3
IN THE PAST 12 MONTHS DO YOU DO LESS THAN YOU USED TO BECAUSE OF YOUR BREATHING PROBLEMS: DISAGREE/UNSURE
DO YOU EVER COUGH UP ANY MUCUS OR PHLEGM?: YES, A FEW DAYS A WEEK OR MONTH
HAVE YOU SMOKED AT LEAST 100 CIGARETTES IN YOUR ENTIRE LIFE: NO/DON'T KNOW

## 2018-05-23 ASSESSMENT — PAIN SCALES - GENERAL
PAINLEVEL_OUTOF10: 0

## 2018-05-23 ASSESSMENT — PATIENT HEALTH QUESTIONNAIRE - PHQ9
1. LITTLE INTEREST OR PLEASURE IN DOING THINGS: NOT AT ALL
2. FEELING DOWN, DEPRESSED, IRRITABLE, OR HOPELESS: NOT AT ALL
SUM OF ALL RESPONSES TO PHQ9 QUESTIONS 1 AND 2: 0

## 2018-05-23 NOTE — ASSESSMENT & PLAN NOTE
Patient presents with acute onset chest pain  Initial troponin and EKG unremarkable  Hypertensive on presentation could be likely cause of chest pain  Chest pain could potentially be from cardiac etiology versus early pneumonia  Will check pro calcitonin  Trend troponin cardiac stress test

## 2018-05-23 NOTE — CARE PLAN
Problem: Safety  Goal: Will remain free from falls  Outcome: PROGRESSING AS EXPECTED  Fall precautions in place, call light w/in reach.  Pt calls appropriately.  Will continue to monitor.    Problem: Knowledge Deficit  Goal: Knowledge of disease process/condition, treatment plan, diagnostic tests, and medications will improve  Outcome: PROGRESSING AS EXPECTED  Discussed POC.  Dr. Martin also at bedside for rounds. Pt verbalized understanding and in agreement of POC.

## 2018-05-23 NOTE — ASSESSMENT & PLAN NOTE
Patient with concerns for pneumonia on chest x-ray however seems to be asymptomatic no cough no fevers no white count no acute hypoxia will check pro-calcitonin  Did receive antibiotics in the emergency department will hold for now

## 2018-05-23 NOTE — CARE PLAN
Problem: Communication  Goal: The ability to communicate needs accurately and effectively will improve  Outcome: PROGRESSING AS EXPECTED  Pt calling appropriately and able to make needs known.    Problem: Safety  Goal: Will remain free from falls  Outcome: PROGRESSING AS EXPECTED  Pt had no falls this shift.

## 2018-05-23 NOTE — ASSESSMENT & PLAN NOTE
Unknown baseline blood pressure however hypertensive on presentation into the 200s  Will give patient ACE inhibitor early  Also when necessary hydralazine ordered  May need adjustment in home blood pressure medications

## 2018-05-23 NOTE — PROGRESS NOTES
Bedside report completed in ED with Ryan SALINAS at 0430. Alert and oriented. VSS. Pt denies CP, SOB, nausea, and palps. Transferred to tele 7. Placed on tele monitor. SR.

## 2018-05-23 NOTE — H&P
Hospital Medicine History and Physical    Date of Service  5/23/2018    Chief Complaint  Chief Complaint   Patient presents with   • Chest Pain     Pt called EMS reporting severe sudden onset of chest pain radiating to her back. The pain has since resolved. EMS reports blood pressures were the same bilaterally   • Hypertension      systolically, EMS called for orders. They gave 5mg of metoprolol prior to arrival with no changes. BP  206/89 at this time.        History of Presenting Illness  85 y.o. female who presented 5/23/2018 with chest pain. Patient presented with chest pain just prior to arrival via EMS. She received 4 doses of 81 mg of aspirin. She woke up with sudden onset of frontal to back chest pain. Chest pain is tight in nature. Presents hypertensive as well. Her chest pain resolved at the time of examination. She does not monitor her blood pressure baseline. She is a former smoker. She has not seen a cardiologist in over 25 years. Denies any headache loss consciousness fever or shortness of breath or abdominal pain. She takes ACE inhibitor at baseline. She states that she took this medication today. She stopped taking aspirin about a month ago secondary to nosebleeds. She denies any diaphoresis nausea shortness of breath during the episode. No active chest pain at this time.     Primary Care Physician  Mariya Bhardwaj M.D.    Consultants  none    Code Status  full    Review of Systems  Review of Systems   Constitutional: Negative for chills and fever.   HENT: Negative for congestion, hearing loss and tinnitus.    Eyes: Negative for blurred vision, double vision and discharge.   Respiratory: Negative for cough, hemoptysis and shortness of breath.    Cardiovascular: Positive for chest pain. Negative for palpitations and leg swelling.   Gastrointestinal: Negative for abdominal pain, heartburn, nausea and vomiting.   Genitourinary: Negative for dysuria and flank pain.   Musculoskeletal: Negative  for joint pain and myalgias.   Skin: Negative for rash.   Neurological: Negative for dizziness, sensory change, speech change, focal weakness and weakness.   Endo/Heme/Allergies: Negative for environmental allergies. Does not bruise/bleed easily.   Psychiatric/Behavioral: Negative for depression, hallucinations and substance abuse.        Past Medical History  HTN  Hyperlipidemia    Surgical History  Past Surgical History:   Procedure Laterality Date   • US-CYST ASPIRATION-BREAST INITIAL         Medications  No current facility-administered medications on file prior to encounter.      Current Outpatient Prescriptions on File Prior to Encounter   Medication Sig Dispense Refill   • benazepril (LOTENSIN) 20 MG Tab Take 20 mg by mouth every day.     • lovastatin (MEVACOR) 10 MG tablet Take 10 mg by mouth every evening.     • risedronate (ACTONEL) 35 MG Tab Take 35 mg by mouth every 7 days.     • aspirin (ASA) 81 MG Chew Tab chewable tablet Take 81 mg by mouth every day.     • Cholecalciferol (VITAMIN D3) 1000 units Cap Take  by mouth.     • azithromycin (ZITHROMAX) 250 MG Tab Take two tabs by mouth on day one, then one tab by mouth daily on days 2-5. 6 Tab 0   • azithromycin (ZITHROMAX) 250 MG Tab Take two tabs by mouth on day one, then one tab by mouth daily on days 2-5. 6 Tab 0       Family History  History reviewed. No pertinent family history.    Social History  Social History   Substance Use Topics   • Smoking status: Former Smoker     Quit date: 2015   • Smokeless tobacco: Never Used   • Alcohol use No       Allergies  Allergies   Allergen Reactions   • Augmentin [Amoxicillin-Pot Clavulanate] Hives   • Bactrim [Sulfamethoxazole W-Trimethoprim] Hives   • Codeine Vomiting   • Voltaren [Diclofenac] Swelling        Physical Exam  Laboratory   Hemodynamics  Temp (24hrs), Av.1 °C (98.8 °F), Min:37.1 °C (98.8 °F), Max:37.1 °C (98.8 °F)   Temperature: 37.1 °C (98.8 °F)  Pulse  Av.3  Min: 59  Max: 65 Heart  Rate (Monitored): 62  Blood Pressure : (!) 217/88, NIBP: (!) 185/73      Respiratory      Respiration: 18, Pulse Oximetry: 91 %             Physical Exam   Constitutional: She is oriented to person, place, and time. She appears well-developed and well-nourished. No distress.   HENT:   Head: Normocephalic and atraumatic.   Eyes: Conjunctivae and EOM are normal. Pupils are equal, round, and reactive to light.   Neck: Normal range of motion. Neck supple. No JVD present.   Cardiovascular: Normal rate, regular rhythm, normal heart sounds and intact distal pulses.    No murmur heard.  Pulmonary/Chest: Effort normal and breath sounds normal. No respiratory distress. She has no wheezes.   Abdominal: Soft. Bowel sounds are normal. She exhibits no distension. There is no tenderness.   Musculoskeletal: Normal range of motion. She exhibits no edema.   Neurological: She is alert and oriented to person, place, and time. She exhibits normal muscle tone.   Skin: Skin is warm and dry.   Psychiatric: She has a normal mood and affect. Her behavior is normal. Judgment and thought content normal.   Nursing note and vitals reviewed.      Recent Labs      05/23/18   0300   WBC  5.3   RBC  5.20   HEMOGLOBIN  15.6   HEMATOCRIT  46.5   MCV  89.4   MCH  30.0   MCHC  33.5*   RDW  45.5   PLATELETCT  169   MPV  9.6     Recent Labs      05/23/18   0200   SODIUM  138   POTASSIUM  3.6   CHLORIDE  103   CO2  28   GLUCOSE  87   BUN  13   CREATININE  0.63   CALCIUM  10.3     Recent Labs      05/23/18   0200   GLUCOSE  87                 Lab Results   Component Value Date    TROPONINI 0.02 05/23/2018     Urinalysis:    Lab Results  Component Value Date/Time   SPECGRAVITY 1.010 09/11/2017 1303   GLUCOSEUR Negative 09/11/2017 1303   KETONES 100 (A) 09/11/2017 1303   NITRITE Negative 09/11/2017 1303   WBCURINE 0-2 09/11/2017 1303   BACTERIA Negative 09/11/2017 1303   EPITHELCELL Few 09/11/2017 1303        Imaging  DX-CHEST-PORTABLE (1 VIEW) [[id number]]  Resulted: 05/23/18 0302   Order Status: Completed Updated: 05/23/18 0304   Narrative:       5/23/2018 2:15 AM    HISTORY/REASON FOR EXAM:  Chest Pain      TECHNIQUE/EXAM DESCRIPTION AND NUMBER OF VIEWS:  Single portable view of the chest.    COMPARISON: None    FINDINGS:        HEART: Not enlarged. There is atherosclerotic calcification in the aortic arch.  LUNGS: There is faint LEFT basilar opacity. Lungs are hyperlucent.  PLEURA: No effusion or pneumothorax.       Impression:       1.  Early pneumonia in LEFT lower lobe versus atelectasis or an area of scar.  2.  Emphysema  3.  Atherosclerosis        Assessment/Plan     I anticipate this patient is appropriate for observation status at this time.    * Chest pain   Assessment & Plan    Patient presents with acute onset chest pain  Initial troponin and EKG unremarkable  Hypertensive on presentation could be likely cause of chest pain  Chest pain could potentially be from cardiac etiology versus early pneumonia  Will check pro calcitonin  Trend troponin cardiac stress test        COPD (chronic obstructive pulmonary disease) ([facility])   Assessment & Plan    Noted on chest x-ray  Respiratory care protocol ordered this is not an acute exacerbation        Hypertensive urgency   Assessment & Plan    Unknown baseline blood pressure however hypertensive on presentation into the 200s  Will give patient ACE inhibitor early  Also when necessary hydralazine ordered  May need adjustment in home blood pressure medications          Chest x-ray abnormality   Assessment & Plan    Patient with concerns for pneumonia on chest x-ray however seems to be asymptomatic no cough no fevers no white count no acute hypoxia will check pro-calcitonin  Did receive antibiotics in the emergency department will hold for now        Hyperlipidemia   Assessment & Plan    Recheck lipid panel  Continue statin            VTE prophylaxis: heparin

## 2018-05-23 NOTE — CARE PLAN
Problem: Nutritional:  Goal: Achieve adequate nutritional intake  Advance diet when feasible and patient will consume 50% of meals  Outcome: NOT MET

## 2018-05-23 NOTE — DIETARY
"Nutrition services: Day 0 of admit.  Diana Tristan is a 85 y.o. female with admitting DX of chest pain and hypertensive urgency.  Pt noted with a low BMI.  Pt appeared thin but well nourished.  I attempted to speak with pt at bedside however pt did not wake to name being called multiple times.  She appeared to be resting in bed comfortably.  RD to follow-up with pt at another appropriate time.    Assessment:  Height: 165.1 cm (5' 5\")  Weight: 48.4 kg (106 lb 11.2 oz)  Body mass index is 17.76 kg/m². - underweight   Diet/Intake: NPO x today for stress test.      Evaluation:   1. Pt noted with hyperlipidemia and COPD.    2. Last BM: 5/22  3. Per chart review, pt weighed 49.7 kg on 4/26/18 and wt upon current admit is 48.4 kg via stand up scale - this is a 3% loss in the last month, which is notable.     Recommendations/Plan:  1. Advance diet when medically feasible  2. Encourage intake of meals  3. Document intake of all meals as % taken in ADL's to provide interdisciplinary communication across all shifts.   4. Monitor weight.  5. Nutrition rep will continue to see patient for ongoing meal and snack preferences.     RD following           "

## 2018-05-23 NOTE — ED PROVIDER NOTES
"ED Provider Note    Scribed for Deondre Dumont M.D. by Zina Carpenter. 5/23/2018, 2:00 AM.    Primary care provider: Mariya Bhardwaj M.D.  Means of arrival: EMS  History obtained from: Patient  History limited by: None    CHIEF COMPLAINT  Chief Complaint   Patient presents with   • Chest Pain     Pt called EMS reporting severe sudden onset of chest pain radiating to her back. The pain has since resolved. EMS reports blood pressures were the same bilaterally   • Hypertension      systolically, EMS called for orders. They gave 5mg of metoprolol prior to arrival with no changes. BP  206/89 at this time.        HPI  Diana Tristan is a 85 y.o. female who presents to the Emergency Department via EMS with chest pain onset just prior to arrival. EMS administered 4 doses of 81 mg ASA en route.The patient reports she woke up with sudden \"front to back chest pain\". She describes her chest pain as \"tight\" in nature. Presents with hypertensive levels. Upon exam, she reports her chest pain has resolved. The patient confirms she does not monitor her blood pressure at baseline. She states she was a former smoker many years ago. The patient has not seen a cardiologist in over 25 years ago. Diana denies headache, loss of consciousness, fever, shortness of breath, and abdominal pain.     Diana takes Benazepril, Lovastatin, Risedronate, Vitamin D3, and Zithromax at baseline. The patient was taking Aspirin daily for many years until her recent epistaxis episode. She reports on April 26, 2018 she had a severe nose bleed. Since the epistaxis she has stopped taking Aspirin daily.     REVIEW OF SYSTEMS  See HPI for further details. All other systems are negative. C.    PAST MEDICAL HISTORY  The patient denies any past chronic medical history.     SURGICAL HISTORY   has a past surgical history that includes US-CYST ASPIRATION-BREAST INITIAL.    SOCIAL HISTORY  Social History   Substance Use Topics   • Smoking status: Former Smoker    " " Quit date: 1/26/2015   • Smokeless tobacco: Never Used   • Alcohol use No      History   Drug Use No       FAMILY HISTORY  History reviewed. No pertinent family history.    CURRENT MEDICATIONS  Reviewed.  See Encounter Summary.     ALLERGIES  Allergies   Allergen Reactions   • Augmentin [Amoxicillin-Pot Clavulanate] Hives   • Bactrim [Sulfamethoxazole W-Trimethoprim] Hives   • Codeine Vomiting   • Voltaren [Diclofenac] Swelling       PHYSICAL EXAM  VITAL SIGNS: BP (!) 217/88   Pulse 65   Resp 20   Ht 1.651 m (5' 5\")   Wt 48 kg (105 lb 13.1 oz)   SpO2 91%   BMI 17.61 kg/m²   Constitutional: Alert in no apparent distress.  HENT: No signs of trauma, Bilateral external ears normal, Nose normal.   Eyes: Pupils are equal and reactive, Conjunctiva normal, Non-icteric.   Neck: Normal range of motion, No tenderness, Supple, No stridor.   Lymphatic: No lymphadenopathy noted.   Cardiovascular: Regular rate and rhythm, no murmurs.   Thorax & Lungs: Normal breath sounds, No respiratory distress, No wheezing, No chest tenderness.   Abdomen: Bowel sounds normal, Soft, No tenderness, No masses, No pulsatile masses. No peritoneal signs.  Skin: Warm, Dry, No erythema, No rash.   Back: No bony tenderness, No CVA tenderness.   Extremities: Intact distal pulses, No edema, No tenderness, No cyanosis  Musculoskeletal: Good range of motion in all major joints. No tenderness to palpation or major deformities noted.   Neurologic: Alert , Normal motor function, Normal sensory function, No focal deficits noted.   Psychiatric: Affect normal, Judgment normal, Mood normal.     DIAGNOSTIC STUDIES / PROCEDURES     LABS  Results for orders placed or performed during the hospital encounter of 05/23/18   Basic Metabolic Panel (BMP)   Result Value Ref Range    Sodium 138 135 - 145 mmol/L    Potassium 3.6 3.6 - 5.5 mmol/L    Chloride 103 96 - 112 mmol/L    Co2 28 20 - 33 mmol/L    Glucose 87 65 - 99 mg/dL    Bun 13 8 - 22 mg/dL    Creatinine 0.63 " 0.50 - 1.40 mg/dL    Calcium 10.3 8.5 - 10.5 mg/dL    Anion Gap 7.0 0.0 - 11.9   Troponin STAT   Result Value Ref Range    Troponin I 0.02 0.00 - 0.04 ng/mL   CBC WITH DIFFERENTIAL   Result Value Ref Range    WBC 5.3 4.8 - 10.8 K/uL    RBC 5.20 4.20 - 5.40 M/uL    Hemoglobin 15.6 12.0 - 16.0 g/dL    Hematocrit 46.5 37.0 - 47.0 %    MCV 89.4 81.4 - 97.8 fL    MCH 30.0 27.0 - 33.0 pg    MCHC 33.5 (L) 33.6 - 35.0 g/dL    RDW 45.5 35.9 - 50.0 fL    Platelet Count 169 164 - 446 K/uL    MPV 9.6 9.0 - 12.9 fL    Neutrophils-Polys 57.60 44.00 - 72.00 %    Lymphocytes 27.40 22.00 - 41.00 %    Monocytes 12.10 0.00 - 13.40 %    Eosinophils 2.30 0.00 - 6.90 %    Basophils 0.40 0.00 - 1.80 %    Immature Granulocytes 0.20 0.00 - 0.90 %    Nucleated RBC 0.00 /100 WBC    Neutrophils (Absolute) 3.06 2.00 - 7.15 K/uL    Lymphs (Absolute) 1.45 1.00 - 4.80 K/uL    Monos (Absolute) 0.64 0.00 - 0.85 K/uL    Eos (Absolute) 0.12 0.00 - 0.51 K/uL    Baso (Absolute) 0.02 0.00 - 0.12 K/uL    Immature Granulocytes (abs) 0.01 0.00 - 0.11 K/uL    NRBC (Absolute) 0.00 K/uL   ESTIMATED GFR   Result Value Ref Range    GFR If African American >60 >60 mL/min/1.73 m 2    GFR If Non African American >60 >60 mL/min/1.73 m 2   EKG (NOW)   Result Value Ref Range    Report       [facility] Emergency Dept.    Test Date:  2018  Pt Name:    BRITTNEY THOMAS                  Department: ER  MRN:        [id number]                      Room:       RD 11  Gender:     Female                       Technician: [id number]  :        1933                   Requested By:GERA [place] #:    [id number]                    Reading MD:    Measurements  Intervals                                Axis  Rate:       60                           P:          81  MS:         160                          QRS:        99  QRSD:       88                           T:          65  QT:         428  QTc:        428    Interpretive Statements  SINUS RHYTHM  RIGHT  AXIS DEVIATION  CONSIDER LEFT VENTRICULAR HYPERTROPHY  ANTERIOR Q WAVES, POSSIBLY DUE TO LVH  No previous ECG available for comparison       All labs were reviewed by me.    EKG  12 Lead EKG interpreted by me to show:  Sinus rhythm  Rate 60  Axis: Right  Intervals: Normal  Slight ST elevation in VII and VIII  LVH      RADIOLOGY  DX-CHEST-PORTABLE (1 VIEW)   Final Result      1.  Early pneumonia in LEFT lower lobe versus atelectasis or an area of scar.   2.  Emphysema   3.  Atherosclerosis        The radiologist's interpretation of all radiological studies and images have been reviewed by me.    COURSE & MEDICAL DECISION MAKING  Pertinent Labs & Imaging studies reviewed. (See chart for details)      2:00 AM - Patient seen and examined at bedside. Ordered Dx-Chest, BMP, Troponin Stat, and EKG to evaluate her symptoms. I discussed plan of care including work up and the very likelihood of the patient being admitted. She agrees with the plan of care.    3:33 AM Recheck: The patient is lying comfortably on gurney. Discussed lab and radiology results which indicated the patient had early pneumonia. I updated the patient on her results and explained she will be admitted for further care. Patient will be treated with Rocephin 2 g.    3:40 AM I discussed the patient's case and the above findings with Dr. Escobar (Hospitalist) who agrees to admit the patient.      Decision Making:  This is a 85 y.o. year old female who presents with acute chest pain concerning for ACS. Chest x-ray does however show a left lower lobe pneumonia. Additional family later and care noted the patient had been feeling unwell earlier this week. However the patient does not have any other significant pulmonary findings or history that would otherwise make the significance of the pneumonia as the etiology of her isolated fast onset of chest discomfort tonight. We will have her start the patient on antibiotics for the pneumonia by do believe that inpatient  cardiac observation will also be required this time. The patient is hypertensive despite home antihypertensive therapy. This too will also need to be monitored and regulated. Patient did get nitroglycerin and metoprolol prior to arrival with minimal improvement. However held significant further management as he did not want to decrease the blood pressure to significantly.    DISPOSITION:  Patient will be admitted to Dr. Escobar in guarded condition.      FINAL IMPRESSION  1. Hypertension, unspecified type    2. Pneumonia of left lower lobe due to infectious organism (HCC)    3. Chest pain, unspecified type          I, Zina Carpenter (Scribe), am scribing for, and in the presence of, Deondre Dumont M.D..    Electronically signed by: Zina Carpenter (Scribe), 5/23/2018    IDeondre M.D. personally performed the services described in this documentation, as scribed by Zina Carpenter in my presence, and it is both accurate and complete.    The note accurately reflects work and decisions made by me.  Deondre Dumont  5/23/2018  6:29 AM

## 2018-05-23 NOTE — PROGRESS NOTES
2 RN skin check completed with JAYLEN RN. Redness and dry skin to right check and nose r/t recent skin CA treatment. Otherwise skin intact. No open areas to bony prominences.

## 2018-05-23 NOTE — ED TRIAGE NOTES
Dianatulio Tristan  85 y.o. female  Chief Complaint   Patient presents with   • Chest Pain     Pt called EMS reporting severe sudden onset of chest pain radiating to her back. The pain has since resolved. EMS reports blood pressures were the same bilaterally   • Hypertension      systolically, EMS called for orders. They gave 5mg of metoprolol prior to arrival with no changes. BP  206/89 at this time.        BIB EMS with above cc.   Pt is alert and oriented, speaking in full sentences, follows commands and responds appropriately to questions. Pt denies all medical complaints at this time. Resp are even and unlabored.  ERP at bedside.

## 2018-05-23 NOTE — PROGRESS NOTES
Pt in bed, resting comfortably.  Denies any pain.  Denies any discomfort.  Elevated sbp, will intervene as necessary.  Otherwise VSS. MD aware.  Fall precautions in place and call light w/in reach.  Will continue to monitor.

## 2018-05-24 ENCOUNTER — PATIENT OUTREACH (OUTPATIENT)
Dept: HEALTH INFORMATION MANAGEMENT | Facility: OTHER | Age: 83
End: 2018-05-24

## 2018-05-24 NOTE — DISCHARGE INSTRUCTIONS
"Food Choices to Lower Your Triglycerides  Triglycerides are a type of fat in your blood. High levels of triglycerides can increase the risk of heart disease and stroke. If your triglyceride levels are high, the foods you eat and your eating habits are very important. Choosing the right foods can help lower your triglycerides.   WHAT GENERAL GUIDELINES DO I NEED TO FOLLOW?  · Lose weight if you are overweight.    · Limit or avoid alcohol.    · Fill one half of your plate with vegetables and green salads.    · Limit fruit to two servings a day. Choose fruit instead of juice.    · Make one fourth of your plate whole grains. Look for the word \"whole\" as the first word in the ingredient list.  · Fill one fourth of your plate with lean protein foods.  · Enjoy fatty fish (such as salmon, mackerel, sardines, and tuna) three times a week.    · Choose healthy fats.    · Limit foods high in starch and sugar.  · Eat more home-cooked food and less restaurant, buffet, and fast food.  · Limit fried foods.  · Cook foods using methods other than frying.  · Limit saturated fats.  · Check ingredient lists to avoid foods with partially hydrogenated oils (trans fats) in them.  WHAT FOODS CAN I EAT?   Grains  Whole grains, such as whole wheat or whole grain breads, crackers, cereals, and pasta. Unsweetened oatmeal, bulgur, barley, quinoa, or brown rice. Corn or whole wheat flour tortillas.   Vegetables  Fresh or frozen vegetables (raw, steamed, roasted, or grilled). Green salads.  Fruits  All fresh, canned (in natural juice), or frozen fruits.  Meat and Other Protein Products  Ground beef (85% or leaner), grass-fed beef, or beef trimmed of fat. Skinless chicken or turkey. Ground chicken or turkey. Pork trimmed of fat. All fish and seafood. Eggs. Dried beans, peas, or lentils. Unsalted nuts or seeds. Unsalted canned or dry beans.  Dairy  Low-fat dairy products, such as skim or 1% milk, 2% or reduced-fat cheeses, low-fat ricotta or cottage " cheese, or plain low-fat yogurt.  Fats and Oils  Tub margarines without trans fats. Light or reduced-fat mayonnaise and salad dressings. Avocado. Safflower, olive, or canola oils. Natural peanut or almond butter.  The items listed above may not be a complete list of recommended foods or beverages. Contact your dietitian for more options.  WHAT FOODS ARE NOT RECOMMENDED?   Grains  White bread. White pasta. White rice. Cornbread. Bagels, pastries, and croissants. Crackers that contain trans fat.  Vegetables  White potatoes. Corn. Creamed or fried vegetables. Vegetables in a cheese sauce.  Fruits  Dried fruits. Canned fruit in light or heavy syrup. Fruit juice.  Meat and Other Protein Products  Fatty cuts of meat. Ribs, chicken wings, white, sausage, bologna, salami, chitterlings, fatback, hot dogs, bratwurst, and packaged luncheon meats.  Dairy  Whole or 2% milk, cream, half-and-half, and cream cheese. Whole-fat or sweetened yogurt. Full-fat cheeses. Nondairy creamers and whipped toppings. Processed cheese, cheese spreads, or cheese curds.  Sweets and Desserts  Corn syrup, sugars, honey, and molasses. Candy. Jam and jelly. Syrup. Sweetened cereals. Cookies, pies, cakes, donuts, muffins, and ice cream.  Fats and Oils  Butter, stick margarine, lard, shortening, ghee, or white fat. Coconut, palm kernel, or palm oils.  Beverages  Alcohol. Sweetened drinks (such as sodas, lemonade, and fruit drinks or punches).  The items listed above may not be a complete list of foods and beverages to avoid. Contact your dietitian for more information.     This information is not intended to replace advice given to you by your health care provider. Make sure you discuss any questions you have with your health care provider.     Document Released: 10/05/2005 Document Revised: 01/08/2016 Document Reviewed: 10/22/2014  [employer] Interactive Patient Education ©2016 [employer]        Hypertension  Hypertension is another name for high blood  pressure. High blood pressure forces your heart to work harder to pump blood. A blood pressure reading has two numbers, which includes a higher number over a lower number (example: 110/72).  Follow these instructions at home:  · Have your blood pressure rechecked by your doctor.  · Only take medicine as told by your doctor. Follow the directions carefully. The medicine does not work as well if you skip doses. Skipping doses also puts you at risk for problems.  · Do not smoke.  · Monitor your blood pressure at home as told by your doctor.  Contact a doctor if:  · You think you are having a reaction to the medicine you are taking.  · You have repeat headaches or feel dizzy.  · You have puffiness (swelling) in your ankles.  · You have trouble with your vision.  Get help right away if:  · You get a very bad headache and are confused.  · You feel weak, numb, or faint.  · You get chest or belly (abdominal) pain.  · You throw up (vomit).  · You cannot breathe very well.  This information is not intended to replace advice given to you by your health care provider. Make sure you discuss any questions you have with your health care provider.  Document Released: 06/05/2009 Document Revised: 05/25/2017 Document Reviewed: 10/10/2014  [employer] Interactive Patient Education © 2017 [employer]      Benazepril tablets  What is this medicine?  BENAZEPRIL (yesenia AY ze pril) is an ACE inhibitor. This medicine is used to treat high blood pressure.  This medicine may be used for other purposes; ask your health care provider or pharmacist if you have questions.  COMMON BRAND NAME(S): Lotensin  What should I tell my health care provider before I take this medicine?  They need to know if you have any of these conditions:  -bone marrow disease  -heart or blood vessel disease  -if you are on a special diet, such as a low salt diet  -immune system disease like lupus  -kidney or liver disease  -low blood pressure  -previous swelling of the  tongue, face, or lips with difficulty breathing, difficulty swallowing, hoarseness, or tightening of the throat  -an unusual or allergic reaction to benazepril, other ACE inhibitors, insect venom, foods, dyes, or preservatives  -pregnant or trying to get pregnant  -breast-feeding  How should I use this medicine?  Take this medicine by mouth with a glass of water. Follow the directions on the prescription label. Take your doses at regular intervals. Do not take your medicine more often than directed. Do not stop taking this medicine except on the advice of your doctor or health care professional.  Talk to your pediatrician regarding the use of this medicine in children. Special care may be needed. While this drug may be prescribed for children as young as 6 years, precautions do apply.  Overdosage: If you think you have taken too much of this medicine contact a poison control center or emergency room at once.  NOTE: This medicine is only for you. Do not share this medicine with others.  What if I miss a dose?  If you miss a dose, take it as soon as you can. If it is almost time for your next dose, take only that dose. Do not take double or extra doses.  What may interact with this medicine?  Do not take this medication with any of the following medications:  -sacubitril; valsartan  This medicine may also interact with the following:  -diuretics  -everolimus  -lithium  -medicines for high blood pressure  -NSAIDs, medicines for pain and inflammation, like ibuprofen or naproxen  -potassium salts or potassium supplements  -sirolimus  -temsirolimus  This list may not describe all possible interactions. Give your health care provider a list of all the medicines, herbs, non-prescription drugs, or dietary supplements you use. Also tell them if you smoke, drink alcohol, or use illegal drugs. Some items may interact with your medicine.  What should I watch for while using this medicine?  Visit your doctor or health care  professional for regular checks on your progress. Check your blood pressure as directed. Ask your doctor or health care professional what your blood pressure should be and when you should contact him or her. Call your doctor or health care professional if you notice an irregular or fast heart beat.  Women should inform their doctor if they wish to become pregnant or think they might be pregnant. There is a potential for serious side effects to an unborn child. Talk to your health care professional or pharmacist for more information.  Check with your doctor or health care professional if you get an attack of severe diarrhea, nausea and vomiting, or if you sweat a lot. The loss of too much body fluid can make it dangerous for you to take this medicine.  You may get drowsy or dizzy. Do not drive, use machinery, or do anything that needs mental alertness until you know how this drug affects you. Do not stand or sit up quickly, especially if you are an older patient. This reduces the risk of dizzy or fainting spells. Alcohol can make you more drowsy and dizzy. Avoid alcoholic drinks.  Avoid salt substitutes unless you are told otherwise by your doctor or health care professional.  Do not treat yourself for coughs, colds, or pain while you are taking this medicine without asking your doctor or health care professional for advice. Some ingredients may increase your blood pressure.  What side effects may I notice from receiving this medicine?  Side effects that you should report to your doctor or health care professional as soon as possible:  -allergic reactions like skin rash, itching or hives, swelling of the face, lips, or tongue  -breathing problems  -fast, irregular heartbeat  -feeling faint or lightheaded, falls  -problems swallowing  -redness, blistering, peeling or loosening of the skin, including inside the mouth  -swelling of ankles, legs  -trouble passing urine or change in the amount of urine  Side effects that  usually do not require medical attention (report to your doctor or health care professional if they continue or are bothersome):  -cough  -headache  -nausea  -sun sensitivity  -tiredness  This list may not describe all possible side effects. Call your doctor for medical advice about side effects. You may report side effects to FDA at 2-613-JKR-5355.  Where should I keep my medicine?  Keep out of the reach of children.  Store at room temperature below 30 degrees C (86 degrees F). Protect from moisture. Keep container tightly closed. Throw away any unused medicine after the expiration date.  NOTE: This sheet is a summary. It may not cover all possible information. If you have questions about this medicine, talk to your doctor, pharmacist, or health care provider.  © 2018 [employer] (2017-02-10 09:22:15)      Discharge Instructions    Discharged to home by car with relative. Discharged via wheelchair, hospital escort: Yes.  Special equipment needed: Not Applicable    Be sure to schedule a follow-up appointment with your primary care doctor or any specialists as instructed.     Discharge Plan:   Diet Plan: Discussed  Activity Level: Discussed  Confirmed Follow up Appointment: Patient to Call and Schedule Appointment  Confirmed Symptoms Management: Discussed  Medication Reconciliation Updated: Yes  Pneumococcal Vaccine Administered/Refused: Given (See MAR)  Influenza Vaccine Indication: Not indicated: Previously immunized this influenza season and > 8 years of age    I understand that a diet low in cholesterol, fat, and sodium is recommended for good health. Unless I have been given specific instructions below for another diet, I accept this instruction as my diet prescription.   Other diet: cardiac      · Is patient discharged on Warfarin / Coumadin?   No     Depression / Suicide Risk    As you are discharged from this [facility] facility, it is important to learn how to keep safe from harming  yourself.    Recognize the warning signs:  · Abrupt changes in personality, positive or negative- including increase in energy   · Giving away possessions  · Change in eating patterns- significant weight changes-  positive or negative  · Change in sleeping patterns- unable to sleep or sleeping all the time   · Unwillingness or inability to communicate  · Depression  · Unusual sadness, discouragement and loneliness  · Talk of wanting to die  · Neglect of personal appearance   · Rebelliousness- reckless behavior  · Withdrawal from people/activities they love  · Confusion- inability to concentrate     If you or a loved one observes any of these behaviors or has concerns about self-harm, here's what you can do:  · Talk about it- your feelings and reasons for harming yourself  · Remove any means that you might use to hurt yourself (examples: pills, rope, extension cords, firearm)  · Get professional help from the community (Mental Health, Substance Abuse, psychological counseling)  · Do not be alone:Call your Safe Contact- someone whom you trust who will be there for you.  · Call your local CRISIS HOTLINE 729-6478 or 166-440-7847  · Call your local Children's Mobile Crisis Response Team [place] (007) 113-9093 or www.[employer]  · Call the toll free National Suicide Prevention Hotlines   · National Suicide Prevention Lifeline 130-670-JRLW (0687)  · National Hope Line Network 800-SUICIDE (667-2604)

## 2018-05-24 NOTE — PROGRESS NOTES
Updated Dr. Martin of pt status, /66, NSR.  Denies any pain, HA or blurry vision.  Stress test done with results, OK to dc pt.

## 2018-05-24 NOTE — DISCHARGE SUMMARY
CHIEF COMPLAINT ON ADMISSION  Chief Complaint   Patient presents with   • Chest Pain     Pt called EMS reporting severe sudden onset of chest pain radiating to her back. The pain has since resolved. EMS reports blood pressures were the same bilaterally   • Hypertension      systolically, EMS called for orders. They gave 5mg of metoprolol prior to arrival with no changes. BP  206/89 at this time.        CODE STATUS  Prior    HPI & HOSPITAL COURSE  85 y.o. female who presented 5/23/2018 with chest pain. Patient presented with chest pain just prior to arrival via EMS. She received 4 doses of 81 mg of aspirin. She woke up with sudden onset of frontal to back chest pain. Chest pain is tight in nature. Presents hypertensive as well. Her chest pain resolved at the time of examination.     She was monitored on tele. troponins negative. bp controlled by increasing lotensin to 40mg po daily. Stress test wnl.      The patient recovered much more quickly than anticipated on admission.    Therefore, she is discharged in good and stable condition with close outpatient follow-up.    SPECIFIC OUTPATIENT FOLLOW-UP  pcp 1 week    DISCHARGE PROBLEM LIST  Principal Problem:    Chest pain POA: Unknown  Active Problems:    Hyperlipidemia POA: Unknown    Chest x-ray abnormality POA: Unknown    COPD (chronic obstructive pulmonary disease) (HCC) POA: Unknown  Resolved Problems:    Hypertensive urgency POA: Unknown      FOLLOW UP  No future appointments.  Mariya Bhardwaj M.D.  [address] [facility] [address]  [address]  [place] [id number]  [phone]      please call to schedule your follow up appointment [facility]  could not get through thank you      MEDICATIONS ON DISCHARGE   Diana Tristan   Home Medication Instructions VALDEZ:[id number]    Printed on:05/24/18 0807   Medication Information                      benazepril (LOTENSIN) 40 MG tablet  Take 1 Tab by mouth every day.             Cholecalciferol (VITAMIN D3) 1000 units Cap  Take  by mouth.              lovastatin (MEVACOR) 10 MG tablet  Take 10 mg by mouth every evening.             risedronate (ACTONEL) 35 MG Tab  Take 35 mg by mouth every 7 days.                 DIET  No orders of the defined types were placed in this encounter.      ACTIVITY  As tolerated.  Weight bearing as tolerated      CONSULTATIONS  none    PROCEDURES  Wed May 23, 2018  5:48 PM   Images     Show images for NM-CARDIAC STRESS TEST   Imaging Result Status     Status: Edited Result - FINAL (Exam End: 2018  3:28 PM)   Imaging Previous Results     Open Hard Copy Result Report (Order #[id number] - NM-CARDIAC STRESS TEST)   Narrative                      Myocardial Perfusion   Report   NUCLEAR IMAGING INTERPRETATION   No myocardial infarct or inducible ischemia.   Normal LEFT ventricular function.   ECG INTERPRETATION   Negative stress ECG for ischemia.     BRITTNEY THOMAS     MRN:    [id number]         Gender:    F     Exam Date: 2018 06:25     Exam Location:      Inpatient     Ordering Phys:     LISBETH DICKSON     NucMed Tech:       Michele Thomas,                       RT (R,N)     Age:    85    :    1933        Ht (in):     65     Wt (lb):     107    BMI:    17.78       Radiologist     Risk Factors:             Smoking, Hypertension     Indications:              Other chest pain     ICD Codes:                     Cardiac History:          Positive risk factors     Cardiac Meds:     Meds Past 24 hrs:     Pretest Chest Pain:       No symptoms     STRESS TEST      Pharmacologi                    c   Harinder   Lexiscan w/    Dose: 0.4 mg   ol:      Exer                         Post-Injection Exercise:        An additional 1 minutes of exercise followed   the                                    intravenous injection               Resting HR (bpm):      74     Peak HR (bpm):         112     Resting BP (mmHg):       143    /   81     Peak BP (mmHg):       143   /   81     MaxPHR:     135     Target HR (bpm):        115     % MaxPHR:     83     Double Product:       [id number]     BP Response:     Stress Termination:       Protocol completed     Stress Symptoms:   FLUSHING SOB PVC NAUSEA     ECG     Resting ECG:     Sinus rhythm. Non-specific ST changes.     Stress ECG:      No ischemic changes with Regadenoson.     IMAGE PROTOCOL      Rest/Stress 1                        Day             RadiopharmaceuticalDose (mCi)   Imaging  Date      Imaging  Time           Inj to Img Time (min)   Rest:   Tc-99m             7.3          23-May-2018        13:41           Tetrofosmin   Stress: Tc-99m             22.3         23-May-2018        14:42           Tetrofosmin     Rest:   Administration Site:       Right forearm   Administered by:      Michele Thomas RT (R,N)     Stress:   Administration Site:       Right forearm   Administered by:      Gerard Gonzalez RN     % Percent HR Achieved:   SPECT RESULTS     Technical Quality:       Good     Raw Data Analysis:   Summed Stress Score:    0   Summed Rest Score:    0   Summed Difference Score:        1   PERFUSION:   No persistent or reversible perfusion defects.     FUNCTIONAL RESULTS (calculated via Gated SPECT)     Stress Image LV EF:        82     %     Upper Normal Limit     Stress EDV:      56     ml   EDVI:    37      ml/m²     Stress ESV:      13     ml   ESVI:    9       ml/m²     TID:    1.21   TID - 1.19      TID (ed) - 1.23   LV Function:   LEFT ventricular cavity size is within normal limits.    Gated images show normal LEFT ventricular wall motion and thickening.                   Murray Rae   Edited by: Ainsley Raines MD   (Electronically Signed)   Final Date:      23 May 2018 15:53   Amended:         23 May 2018 17:48   Reading Provider Reading Date   No Reading Provider Prelim May 23, 2018   Murray Rae M.D. May 23, 2018   Ainsley Raines M.D. May 23, 2018   Signing Provider Signing Date Signing Time   Murray Rae M.D. May 23, 2018  3:53 PM   Ainsley Raines M.D.            LABORATORY  Lab Results   Component Value Date/Time    SODIUM 138 05/23/2018 02:00 AM    POTASSIUM 3.6 05/23/2018 02:00 AM    CHLORIDE 103 05/23/2018 02:00 AM    CO2 28 05/23/2018 02:00 AM    GLUCOSE 87 05/23/2018 02:00 AM    BUN 13 05/23/2018 02:00 AM    CREATININE 0.63 05/23/2018 02:00 AM        Lab Results   Component Value Date/Time    WBC 5.3 05/23/2018 03:00 AM    HEMOGLOBIN 15.6 05/23/2018 03:00 AM    HEMATOCRIT 46.5 05/23/2018 03:00 AM    PLATELETCT 169 05/23/2018 03:00 AM        Total time of the discharge process exceeds 40 minutes

## 2018-07-11 ENCOUNTER — HOSPITAL ENCOUNTER (OUTPATIENT)
Dept: LAB | Facility: MEDICAL CENTER | Age: 83
End: 2018-07-11
Attending: NURSE PRACTITIONER
Payer: MEDICARE

## 2018-07-11 LAB
ALBUMIN SERPL BCP-MCNC: 4.2 G/DL (ref 3.2–4.9)
ALBUMIN/GLOB SERPL: 1.8 G/DL
ALP SERPL-CCNC: 48 U/L (ref 30–99)
ALT SERPL-CCNC: 10 U/L (ref 2–50)
ANION GAP SERPL CALC-SCNC: 8 MMOL/L (ref 0–11.9)
AST SERPL-CCNC: 23 U/L (ref 12–45)
BASOPHILS # BLD AUTO: 0.5 % (ref 0–1.8)
BASOPHILS # BLD: 0.03 K/UL (ref 0–0.12)
BILIRUB SERPL-MCNC: 0.8 MG/DL (ref 0.1–1.5)
BUN SERPL-MCNC: 13 MG/DL (ref 8–22)
CALCIUM SERPL-MCNC: 9.2 MG/DL (ref 8.5–10.5)
CHLORIDE SERPL-SCNC: 102 MMOL/L (ref 96–112)
CHOLEST SERPL-MCNC: 142 MG/DL (ref 100–199)
CO2 SERPL-SCNC: 28 MMOL/L (ref 20–33)
CREAT SERPL-MCNC: 0.66 MG/DL (ref 0.5–1.4)
EOSINOPHIL # BLD AUTO: 0.08 K/UL (ref 0–0.51)
EOSINOPHIL NFR BLD: 1.4 % (ref 0–6.9)
ERYTHROCYTE [DISTWIDTH] IN BLOOD BY AUTOMATED COUNT: 46 FL (ref 35.9–50)
GLOBULIN SER CALC-MCNC: 2.3 G/DL (ref 1.9–3.5)
GLUCOSE SERPL-MCNC: 78 MG/DL (ref 65–99)
HCT VFR BLD AUTO: 49.9 % (ref 37–47)
HDLC SERPL-MCNC: 60 MG/DL
HGB BLD-MCNC: 16.8 G/DL (ref 12–16)
IMM GRANULOCYTES # BLD AUTO: 0.01 K/UL (ref 0–0.11)
IMM GRANULOCYTES NFR BLD AUTO: 0.2 % (ref 0–0.9)
LDLC SERPL CALC-MCNC: 69 MG/DL
LYMPHOCYTES # BLD AUTO: 2.45 K/UL (ref 1–4.8)
LYMPHOCYTES NFR BLD: 44.4 % (ref 22–41)
MCH RBC QN AUTO: 30.7 PG (ref 27–33)
MCHC RBC AUTO-ENTMCNC: 33.7 G/DL (ref 33.6–35)
MCV RBC AUTO: 91.2 FL (ref 81.4–97.8)
MONOCYTES # BLD AUTO: 0.56 K/UL (ref 0–0.85)
MONOCYTES NFR BLD AUTO: 10.1 % (ref 0–13.4)
NEUTROPHILS # BLD AUTO: 2.39 K/UL (ref 2–7.15)
NEUTROPHILS NFR BLD: 43.4 % (ref 44–72)
NRBC # BLD AUTO: 0 K/UL
NRBC BLD-RTO: 0 /100 WBC
PLATELET # BLD AUTO: 157 K/UL (ref 164–446)
PMV BLD AUTO: 10.5 FL (ref 9–12.9)
POTASSIUM SERPL-SCNC: 3.9 MMOL/L (ref 3.6–5.5)
PROT SERPL-MCNC: 6.5 G/DL (ref 6–8.2)
RBC # BLD AUTO: 5.47 M/UL (ref 4.2–5.4)
SODIUM SERPL-SCNC: 138 MMOL/L (ref 135–145)
TRIGL SERPL-MCNC: 67 MG/DL (ref 0–149)
WBC # BLD AUTO: 5.5 K/UL (ref 4.8–10.8)

## 2018-07-11 PROCEDURE — 36415 COLL VENOUS BLD VENIPUNCTURE: CPT

## 2018-07-11 PROCEDURE — 80061 LIPID PANEL: CPT

## 2018-07-11 PROCEDURE — 80053 COMPREHEN METABOLIC PANEL: CPT

## 2018-07-11 PROCEDURE — 85025 COMPLETE CBC W/AUTO DIFF WBC: CPT

## 2018-08-07 ENCOUNTER — HOSPITAL ENCOUNTER (OUTPATIENT)
Dept: RADIOLOGY | Facility: MEDICAL CENTER | Age: 83
End: 2018-08-07
Attending: FAMILY MEDICINE
Payer: MEDICARE

## 2018-08-07 DIAGNOSIS — M81.0 SENILE OSTEOPOROSIS: ICD-10-CM

## 2018-08-07 PROCEDURE — 77080 DXA BONE DENSITY AXIAL: CPT

## 2019-08-10 ENCOUNTER — HOSPITAL ENCOUNTER (OUTPATIENT)
Dept: LAB | Facility: MEDICAL CENTER | Age: 84
End: 2019-08-10
Attending: FAMILY MEDICINE
Payer: MEDICARE

## 2019-08-10 LAB
ALBUMIN SERPL BCP-MCNC: 3.9 G/DL (ref 3.2–4.9)
ALBUMIN/GLOB SERPL: 1.3 G/DL
ALP SERPL-CCNC: 53 U/L (ref 30–99)
ALT SERPL-CCNC: 15 U/L (ref 2–50)
ANION GAP SERPL CALC-SCNC: 8 MMOL/L (ref 0–11.9)
APPEARANCE UR: CLEAR
AST SERPL-CCNC: 21 U/L (ref 12–45)
BACTERIA #/AREA URNS HPF: ABNORMAL /HPF
BASOPHILS # BLD AUTO: 0.5 % (ref 0–1.8)
BASOPHILS # BLD: 0.03 K/UL (ref 0–0.12)
BILIRUB SERPL-MCNC: 0.6 MG/DL (ref 0.1–1.5)
BILIRUB UR QL STRIP.AUTO: NEGATIVE
BUN SERPL-MCNC: 24 MG/DL (ref 8–22)
CALCIUM SERPL-MCNC: 9.6 MG/DL (ref 8.5–10.5)
CHLORIDE SERPL-SCNC: 101 MMOL/L (ref 96–112)
CHOLEST SERPL-MCNC: 144 MG/DL (ref 100–199)
CO2 SERPL-SCNC: 31 MMOL/L (ref 20–33)
COLOR UR: YELLOW
CREAT SERPL-MCNC: 0.77 MG/DL (ref 0.5–1.4)
EOSINOPHIL # BLD AUTO: 0.3 K/UL (ref 0–0.51)
EOSINOPHIL NFR BLD: 5.4 % (ref 0–6.9)
EPI CELLS #/AREA URNS HPF: ABNORMAL /HPF
ERYTHROCYTE [DISTWIDTH] IN BLOOD BY AUTOMATED COUNT: 49.3 FL (ref 35.9–50)
FASTING STATUS PATIENT QL REPORTED: NORMAL
GLOBULIN SER CALC-MCNC: 3 G/DL (ref 1.9–3.5)
GLUCOSE SERPL-MCNC: 96 MG/DL (ref 65–99)
GLUCOSE UR STRIP.AUTO-MCNC: NEGATIVE MG/DL
HCT VFR BLD AUTO: 49.9 % (ref 37–47)
HDLC SERPL-MCNC: 45 MG/DL
HGB BLD-MCNC: 16.1 G/DL (ref 12–16)
HYALINE CASTS #/AREA URNS LPF: ABNORMAL /LPF
IMM GRANULOCYTES # BLD AUTO: 0.01 K/UL (ref 0–0.11)
IMM GRANULOCYTES NFR BLD AUTO: 0.2 % (ref 0–0.9)
KETONES UR STRIP.AUTO-MCNC: NEGATIVE MG/DL
LDLC SERPL CALC-MCNC: 79 MG/DL
LEUKOCYTE ESTERASE UR QL STRIP.AUTO: ABNORMAL
LYMPHOCYTES # BLD AUTO: 2.08 K/UL (ref 1–4.8)
LYMPHOCYTES NFR BLD: 37.7 % (ref 22–41)
MCH RBC QN AUTO: 30.3 PG (ref 27–33)
MCHC RBC AUTO-ENTMCNC: 32.3 G/DL (ref 33.6–35)
MCV RBC AUTO: 94 FL (ref 81.4–97.8)
MICRO URNS: ABNORMAL
MONOCYTES # BLD AUTO: 0.67 K/UL (ref 0–0.85)
MONOCYTES NFR BLD AUTO: 12.2 % (ref 0–13.4)
NEUTROPHILS # BLD AUTO: 2.42 K/UL (ref 2–7.15)
NEUTROPHILS NFR BLD: 44 % (ref 44–72)
NITRITE UR QL STRIP.AUTO: NEGATIVE
NRBC # BLD AUTO: 0 K/UL
NRBC BLD-RTO: 0 /100 WBC
PH UR STRIP.AUTO: 6.5 [PH] (ref 5–8)
PLATELET # BLD AUTO: 222 K/UL (ref 164–446)
PMV BLD AUTO: 9.7 FL (ref 9–12.9)
POTASSIUM SERPL-SCNC: 3.9 MMOL/L (ref 3.6–5.5)
PROT SERPL-MCNC: 6.9 G/DL (ref 6–8.2)
PROT UR QL STRIP: 30 MG/DL
RBC # BLD AUTO: 5.31 M/UL (ref 4.2–5.4)
RBC # URNS HPF: ABNORMAL /HPF
RBC UR QL AUTO: NEGATIVE
SODIUM SERPL-SCNC: 140 MMOL/L (ref 135–145)
SP GR UR STRIP.AUTO: 1.02
TRIGL SERPL-MCNC: 98 MG/DL (ref 0–149)
UROBILINOGEN UR STRIP.AUTO-MCNC: 0.2 MG/DL
WBC # BLD AUTO: 5.5 K/UL (ref 4.8–10.8)
WBC #/AREA URNS HPF: ABNORMAL /HPF

## 2019-08-10 PROCEDURE — 87086 URINE CULTURE/COLONY COUNT: CPT

## 2019-08-10 PROCEDURE — 85025 COMPLETE CBC W/AUTO DIFF WBC: CPT

## 2019-08-10 PROCEDURE — 80053 COMPREHEN METABOLIC PANEL: CPT

## 2019-08-10 PROCEDURE — 80061 LIPID PANEL: CPT

## 2019-08-10 PROCEDURE — 36415 COLL VENOUS BLD VENIPUNCTURE: CPT

## 2019-08-10 PROCEDURE — 81001 URINALYSIS AUTO W/SCOPE: CPT

## 2019-08-12 LAB
BACTERIA UR CULT: NORMAL
SIGNIFICANT IND 70042: NORMAL
SITE SITE: NORMAL
SOURCE SOURCE: NORMAL

## 2019-08-14 ENCOUNTER — APPOINTMENT (OUTPATIENT)
Dept: RADIOLOGY | Facility: MEDICAL CENTER | Age: 84
DRG: 189 | End: 2019-08-14
Attending: EMERGENCY MEDICINE
Payer: MEDICARE

## 2019-08-14 ENCOUNTER — APPOINTMENT (OUTPATIENT)
Dept: RADIOLOGY | Facility: MEDICAL CENTER | Age: 84
DRG: 189 | End: 2019-08-14
Attending: INTERNAL MEDICINE
Payer: MEDICARE

## 2019-08-14 ENCOUNTER — HOSPITAL ENCOUNTER (INPATIENT)
Facility: MEDICAL CENTER | Age: 84
LOS: 2 days | DRG: 189 | End: 2019-08-16
Attending: EMERGENCY MEDICINE | Admitting: INTERNAL MEDICINE
Payer: MEDICARE

## 2019-08-14 PROBLEM — I10 ESSENTIAL HYPERTENSION: Status: ACTIVE | Noted: 2019-08-14

## 2019-08-14 PROBLEM — J96.01 ACUTE RESPIRATORY FAILURE WITH HYPOXEMIA (HCC): Status: ACTIVE | Noted: 2019-08-14

## 2019-08-14 PROBLEM — R19.7 DIARRHEA: Status: ACTIVE | Noted: 2019-08-14

## 2019-08-14 LAB
ALBUMIN SERPL BCP-MCNC: 4.1 G/DL (ref 3.2–4.9)
ALBUMIN/GLOB SERPL: 1.4 G/DL
ALP SERPL-CCNC: 52 U/L (ref 30–99)
ALT SERPL-CCNC: 18 U/L (ref 2–50)
ANION GAP SERPL CALC-SCNC: 10 MMOL/L (ref 0–11.9)
APPEARANCE UR: CLEAR
AST SERPL-CCNC: 25 U/L (ref 12–45)
BACTERIA #/AREA URNS HPF: ABNORMAL /HPF
BASOPHILS # BLD AUTO: 0.2 % (ref 0–1.8)
BASOPHILS # BLD: 0.03 K/UL (ref 0–0.12)
BILIRUB SERPL-MCNC: 1.2 MG/DL (ref 0.1–1.5)
BILIRUB UR QL STRIP.AUTO: NEGATIVE
BUN SERPL-MCNC: 18 MG/DL (ref 8–22)
CALCIUM SERPL-MCNC: 9.3 MG/DL (ref 8.4–10.2)
CHLORIDE SERPL-SCNC: 100 MMOL/L (ref 96–112)
CO2 SERPL-SCNC: 26 MMOL/L (ref 20–33)
COLOR UR: YELLOW
CREAT SERPL-MCNC: 0.77 MG/DL (ref 0.5–1.4)
EKG IMPRESSION: NORMAL
EOSINOPHIL # BLD AUTO: 0.03 K/UL (ref 0–0.51)
EOSINOPHIL NFR BLD: 0.2 % (ref 0–6.9)
EPI CELLS #/AREA URNS HPF: ABNORMAL /HPF
ERYTHROCYTE [DISTWIDTH] IN BLOOD BY AUTOMATED COUNT: 47.3 FL (ref 35.9–50)
GLOBULIN SER CALC-MCNC: 3 G/DL (ref 1.9–3.5)
GLUCOSE SERPL-MCNC: 180 MG/DL (ref 65–99)
GLUCOSE UR STRIP.AUTO-MCNC: NEGATIVE MG/DL
HCT VFR BLD AUTO: 49.6 % (ref 37–47)
HGB BLD-MCNC: 16.5 G/DL (ref 12–16)
HYALINE CASTS #/AREA URNS LPF: ABNORMAL /LPF
IMM GRANULOCYTES # BLD AUTO: 0.05 K/UL (ref 0–0.11)
IMM GRANULOCYTES NFR BLD AUTO: 0.4 % (ref 0–0.9)
KETONES UR STRIP.AUTO-MCNC: NEGATIVE MG/DL
LACTATE BLD-SCNC: 1.2 MMOL/L (ref 0.5–2)
LACTATE BLD-SCNC: 1.7 MMOL/L (ref 0.5–2)
LEUKOCYTE ESTERASE UR QL STRIP.AUTO: NEGATIVE
LYMPHOCYTES # BLD AUTO: 0.47 K/UL (ref 1–4.8)
LYMPHOCYTES NFR BLD: 3.6 % (ref 22–41)
MCH RBC QN AUTO: 30.4 PG (ref 27–33)
MCHC RBC AUTO-ENTMCNC: 33.3 G/DL (ref 33.6–35)
MCV RBC AUTO: 91.3 FL (ref 81.4–97.8)
MICRO URNS: ABNORMAL
MONOCYTES # BLD AUTO: 0.62 K/UL (ref 0–0.85)
MONOCYTES NFR BLD AUTO: 4.8 % (ref 0–13.4)
MUCOUS THREADS #/AREA URNS HPF: ABNORMAL /HPF
NEUTROPHILS # BLD AUTO: 11.73 K/UL (ref 2–7.15)
NEUTROPHILS NFR BLD: 90.8 % (ref 44–72)
NITRITE UR QL STRIP.AUTO: NEGATIVE
NRBC # BLD AUTO: 0 K/UL
NRBC BLD-RTO: 0 /100 WBC
NT-PROBNP SERPL IA-MCNC: 728 PG/ML (ref 0–125)
PH UR STRIP.AUTO: 7.5 [PH] (ref 5–8)
PLATELET # BLD AUTO: 194 K/UL (ref 164–446)
PMV BLD AUTO: 8.8 FL (ref 9–12.9)
POTASSIUM SERPL-SCNC: 3.9 MMOL/L (ref 3.6–5.5)
PROT SERPL-MCNC: 7.1 G/DL (ref 6–8.2)
PROT UR QL STRIP: 30 MG/DL
RBC # BLD AUTO: 5.43 M/UL (ref 4.2–5.4)
RBC # URNS HPF: ABNORMAL /HPF
RBC UR QL AUTO: NEGATIVE
SODIUM SERPL-SCNC: 136 MMOL/L (ref 135–145)
SP GR UR STRIP.AUTO: 1.01
TROPONIN T SERPL-MCNC: 16 NG/L (ref 6–19)
WBC # BLD AUTO: 12.9 K/UL (ref 4.8–10.8)
WBC #/AREA URNS HPF: ABNORMAL /HPF

## 2019-08-14 PROCEDURE — 83605 ASSAY OF LACTIC ACID: CPT | Mod: 91

## 2019-08-14 PROCEDURE — 71250 CT THORAX DX C-: CPT

## 2019-08-14 PROCEDURE — 94760 N-INVAS EAR/PLS OXIMETRY 1: CPT

## 2019-08-14 PROCEDURE — 80053 COMPREHEN METABOLIC PANEL: CPT

## 2019-08-14 PROCEDURE — 770006 HCHG ROOM/CARE - MED/SURG/GYN SEMI*

## 2019-08-14 PROCEDURE — 99285 EMERGENCY DEPT VISIT HI MDM: CPT

## 2019-08-14 PROCEDURE — 700105 HCHG RX REV CODE 258: Performed by: EMERGENCY MEDICINE

## 2019-08-14 PROCEDURE — 700102 HCHG RX REV CODE 250 W/ 637 OVERRIDE(OP): Performed by: EMERGENCY MEDICINE

## 2019-08-14 PROCEDURE — 700111 HCHG RX REV CODE 636 W/ 250 OVERRIDE (IP): Performed by: INTERNAL MEDICINE

## 2019-08-14 PROCEDURE — 87040 BLOOD CULTURE FOR BACTERIA: CPT | Mod: 91

## 2019-08-14 PROCEDURE — 93005 ELECTROCARDIOGRAM TRACING: CPT

## 2019-08-14 PROCEDURE — 36415 COLL VENOUS BLD VENIPUNCTURE: CPT

## 2019-08-14 PROCEDURE — 71045 X-RAY EXAM CHEST 1 VIEW: CPT

## 2019-08-14 PROCEDURE — 81001 URINALYSIS AUTO W/SCOPE: CPT

## 2019-08-14 PROCEDURE — 700102 HCHG RX REV CODE 250 W/ 637 OVERRIDE(OP): Performed by: INTERNAL MEDICINE

## 2019-08-14 PROCEDURE — 85025 COMPLETE CBC W/AUTO DIFF WBC: CPT

## 2019-08-14 PROCEDURE — A9270 NON-COVERED ITEM OR SERVICE: HCPCS | Performed by: EMERGENCY MEDICINE

## 2019-08-14 PROCEDURE — 87086 URINE CULTURE/COLONY COUNT: CPT

## 2019-08-14 PROCEDURE — 99223 1ST HOSP IP/OBS HIGH 75: CPT | Mod: AI | Performed by: INTERNAL MEDICINE

## 2019-08-14 PROCEDURE — 83880 ASSAY OF NATRIURETIC PEPTIDE: CPT

## 2019-08-14 PROCEDURE — A9270 NON-COVERED ITEM OR SERVICE: HCPCS | Performed by: INTERNAL MEDICINE

## 2019-08-14 PROCEDURE — 700111 HCHG RX REV CODE 636 W/ 250 OVERRIDE (IP): Performed by: HOSPITALIST

## 2019-08-14 PROCEDURE — 84484 ASSAY OF TROPONIN QUANT: CPT

## 2019-08-14 RX ORDER — ONDANSETRON 4 MG/1
4 TABLET, ORALLY DISINTEGRATING ORAL EVERY 4 HOURS PRN
Status: DISCONTINUED | OUTPATIENT
Start: 2019-08-14 | End: 2019-08-16 | Stop reason: HOSPADM

## 2019-08-14 RX ORDER — ACETAMINOPHEN 325 MG/1
650 TABLET ORAL EVERY 6 HOURS PRN
Status: DISCONTINUED | OUTPATIENT
Start: 2019-08-14 | End: 2019-08-16 | Stop reason: HOSPADM

## 2019-08-14 RX ORDER — ONDANSETRON 2 MG/ML
4 INJECTION INTRAMUSCULAR; INTRAVENOUS EVERY 4 HOURS PRN
Status: DISCONTINUED | OUTPATIENT
Start: 2019-08-14 | End: 2019-08-16 | Stop reason: HOSPADM

## 2019-08-14 RX ORDER — ACETAMINOPHEN 500 MG
1000 TABLET ORAL EVERY 6 HOURS PRN
Status: DISCONTINUED | OUTPATIENT
Start: 2019-08-14 | End: 2019-08-16 | Stop reason: HOSPADM

## 2019-08-14 RX ORDER — HYDRALAZINE HYDROCHLORIDE 20 MG/ML
20 INJECTION INTRAMUSCULAR; INTRAVENOUS ONCE
Status: COMPLETED | OUTPATIENT
Start: 2019-08-14 | End: 2019-08-14

## 2019-08-14 RX ORDER — BENAZEPRIL HYDROCHLORIDE 10 MG/1
40 TABLET ORAL DAILY
Status: DISCONTINUED | OUTPATIENT
Start: 2019-08-15 | End: 2019-08-16 | Stop reason: HOSPADM

## 2019-08-14 RX ORDER — ACETAMINOPHEN 500 MG
1000 TABLET ORAL EVERY 6 HOURS PRN
Status: ON HOLD | COMMUNITY
End: 2019-08-16

## 2019-08-14 RX ORDER — AMLODIPINE BESYLATE 5 MG/1
5 TABLET ORAL 2 TIMES DAILY
Status: DISCONTINUED | OUTPATIENT
Start: 2019-08-14 | End: 2019-08-16 | Stop reason: HOSPADM

## 2019-08-14 RX ORDER — LOVASTATIN 20 MG/1
10 TABLET ORAL NIGHTLY
Status: DISCONTINUED | OUTPATIENT
Start: 2019-08-14 | End: 2019-08-16 | Stop reason: HOSPADM

## 2019-08-14 RX ORDER — NITROFURANTOIN 25; 75 MG/1; MG/1
100 CAPSULE ORAL 2 TIMES DAILY
Refills: 0 | Status: ON HOLD | COMMUNITY
Start: 2019-08-06 | End: 2019-08-16

## 2019-08-14 RX ORDER — SODIUM CHLORIDE 9 MG/ML
30 INJECTION, SOLUTION INTRAVENOUS ONCE
Status: COMPLETED | OUTPATIENT
Start: 2019-08-14 | End: 2019-08-14

## 2019-08-14 RX ORDER — FUROSEMIDE 10 MG/ML
20 INJECTION INTRAMUSCULAR; INTRAVENOUS
Status: DISCONTINUED | OUTPATIENT
Start: 2019-08-14 | End: 2019-08-16 | Stop reason: HOSPADM

## 2019-08-14 RX ORDER — ACETAMINOPHEN 325 MG/1
650 TABLET ORAL ONCE
Status: COMPLETED | OUTPATIENT
Start: 2019-08-14 | End: 2019-08-14

## 2019-08-14 RX ADMIN — FUROSEMIDE 20 MG: 10 INJECTION, SOLUTION INTRAVENOUS at 16:11

## 2019-08-14 RX ADMIN — ACETAMINOPHEN 1000 MG: 500 TABLET, FILM COATED ORAL at 22:31

## 2019-08-14 RX ADMIN — AMLODIPINE BESYLATE 5 MG: 5 TABLET ORAL at 21:45

## 2019-08-14 RX ADMIN — SODIUM CHLORIDE 1380 ML: 9 INJECTION, SOLUTION INTRAVENOUS at 11:18

## 2019-08-14 RX ADMIN — LOVASTATIN 10 MG: 20 TABLET ORAL at 20:26

## 2019-08-14 RX ADMIN — HYDRALAZINE HYDROCHLORIDE 20 MG: 20 INJECTION INTRAMUSCULAR; INTRAVENOUS at 21:45

## 2019-08-14 RX ADMIN — ACETAMINOPHEN 650 MG: 325 TABLET, FILM COATED ORAL at 11:18

## 2019-08-14 ASSESSMENT — COGNITIVE AND FUNCTIONAL STATUS - GENERAL
SUGGESTED CMS G CODE MODIFIER DAILY ACTIVITY: CH
SUGGESTED CMS G CODE MODIFIER MOBILITY: CH
DAILY ACTIVITIY SCORE: 24
MOBILITY SCORE: 24

## 2019-08-14 ASSESSMENT — COPD QUESTIONNAIRES
DURING THE PAST 4 WEEKS HOW MUCH DID YOU FEEL SHORT OF BREATH: NONE/LITTLE OF THE TIME
HAVE YOU SMOKED AT LEAST 100 CIGARETTES IN YOUR ENTIRE LIFE: YES
DO YOU EVER COUGH UP ANY MUCUS OR PHLEGM?: NO/ONLY WITH OCCASIONAL COLDS OR INFECTIONS
COPD SCREENING SCORE: 4

## 2019-08-14 ASSESSMENT — ENCOUNTER SYMPTOMS
ABDOMINAL PAIN: 1
SHORTNESS OF BREATH: 0
DIAPHORESIS: 0
SORE THROAT: 0
CHILLS: 0
DIZZINESS: 0
SPUTUM PRODUCTION: 0
DIARRHEA: 1
VOMITING: 0
WEIGHT LOSS: 0
NERVOUS/ANXIOUS: 0
FEVER: 0
COUGH: 1
NAUSEA: 0
HEADACHES: 0

## 2019-08-14 ASSESSMENT — LIFESTYLE VARIABLES
ALCOHOL_USE: NO
EVER_SMOKED: YES
HAVE YOU EVER FELT YOU SHOULD CUT DOWN ON YOUR DRINKING: NO
EVER FELT BAD OR GUILTY ABOUT YOUR DRINKING: NO
EVER HAD A DRINK FIRST THING IN THE MORNING TO STEADY YOUR NERVES TO GET RID OF A HANGOVER: NO
TOTAL SCORE: 0
ON A TYPICAL DAY WHEN YOU DRINK ALCOHOL HOW MANY DRINKS DO YOU HAVE: 0
HOW MANY TIMES IN THE PAST YEAR HAVE YOU HAD 5 OR MORE DRINKS IN A DAY: 0
TOTAL SCORE: 0
CONSUMPTION TOTAL: NEGATIVE
HAVE PEOPLE ANNOYED YOU BY CRITICIZING YOUR DRINKING: NO
TOTAL SCORE: 0
AVERAGE NUMBER OF DAYS PER WEEK YOU HAVE A DRINK CONTAINING ALCOHOL: 0

## 2019-08-14 NOTE — ED NOTES
Report received from Kaykay SALINAS, IVF hung and medicated per MAR, updated on the need for urine sample, unable to go at thi time, will monitor.

## 2019-08-14 NOTE — ED TRIAGE NOTES
"Chief Complaint   Patient presents with   • Diarrhea     started this morning   • Fever     started with chills 2 days ago off and on.   • UTI     pt is on second round of macrobid for UTI     BP (!) 167/75   Pulse 86   Temp (!) 38.3 °C (101 °F) (Temporal)   Resp (!) 22   Ht 1.626 m (5' 4\")   Wt 46 kg (101 lb 6.6 oz)   SpO2 (!) 84%   BMI 17.41 kg/m²     "

## 2019-08-14 NOTE — PROGRESS NOTES
Anita Chadwick hospitalist, pt's BP elevated 180s/70s, BNP elevated, new orders received for lasix 20mg IV

## 2019-08-14 NOTE — PROGRESS NOTES
2 RN skin assessment complete, skin WDL. Pt has slight bruising/dusky LE, no signs of skin breakdown

## 2019-08-14 NOTE — ED NOTES
Med Rec updated and complete per pt at bedside  Allergies have been verified and updated  Pt home pharmacy:[facility]        Pt reports that she is on  a 10 day course of MACROBID that was started on 08/13/2019

## 2019-08-14 NOTE — ED NOTES
Mini cath and urine sample collected, labeled and sent to lab,  Lactic acid drawn and sent to lab.

## 2019-08-14 NOTE — PROGRESS NOTES
Received report from Bette SALINAS. Assumed care. This pt is AOx4, denies pain, Patient and RN discussed plan of care including C-diff sample, monitor oxygen, RT tx as needed, special isolation precautions: questions answered. Chart reviewed. Call light in place, fall precautions in place, patient educated on importance of calling for assistance. No additional needs at this time.

## 2019-08-14 NOTE — ED PROVIDER NOTES
ED Provider Note    CHIEF COMPLAINT  No chief complaint on file.      HPI  Diana Tristan is a 86 y.o. female who presents with diarrhea.  Patient is currently on Macrobid for a urinary tract infection.  This is her second round of the Macrobid as she states the initial infection did not clear.  She has had 2 episodes of diarrhea since last night.  She overall feels weak and not well.  She is had a little bit of crampy lower abdominal pain.  No blood in her stool.  She has had a fever.  She denies any chest pain or trouble breathing.  She denies any focal weakness.  No headache.  No cough.  She has some low back pain which she states is chronic.  She denies history of diabetes and is not on anticoagulation.    REVIEW OF SYSTEMS  See HPI for further details. All other systems are negative.     PAST MEDICAL HISTORY  Past Medical History:   Diagnosis Date   • UTI (urinary tract infection)        FAMILY HISTORY  [unfilled]    SOCIAL HISTORY  Social History     Socioeconomic History   • Marital status:      Spouse name: Not on file   • Number of children: Not on file   • Years of education: Not on file   • Highest education level: Not on file   Occupational History   • Not on file   Social Needs   • Financial resource strain: Not on file   • Food insecurity:     Worry: Not on file     Inability: Not on file   • Transportation needs:     Medical: Not on file     Non-medical: Not on file   Tobacco Use   • Smoking status: Former Smoker     Last attempt to quit: 2015     Years since quittin.5   • Smokeless tobacco: Never Used   Substance and Sexual Activity   • Alcohol use: No   • Drug use: No   • Sexual activity: Not on file   Lifestyle   • Physical activity:     Days per week: Not on file     Minutes per session: Not on file   • Stress: Not on file   Relationships   • Social connections:     Talks on phone: Not on file     Gets together: Not on file     Attends Rastafarian service: Not on file     Active  "member of club or organization: Not on file     Attends meetings of clubs or organizations: Not on file     Relationship status: Not on file   • Intimate partner violence:     Fear of current or ex partner: Not on file     Emotionally abused: Not on file     Physically abused: Not on file     Forced sexual activity: Not on file   Other Topics Concern   • Not on file   Social History Narrative   • Not on file       SURGICAL HISTORY  Past Surgical History:   Procedure Laterality Date   • US-CYST ASPIRATION-BREAST INITIAL         CURRENT MEDICATIONS  Home Medications    **Home medications have not yet been reviewed for this encounter**         ALLERGIES  Allergies   Allergen Reactions   • Augmentin [Amoxicillin-Pot Clavulanate] Hives   • Bactrim [Sulfamethoxazole W-Trimethoprim] Hives   • Codeine Vomiting   • Voltaren [Diclofenac] Swelling       PHYSICAL EXAM  VITAL SIGNS: BP (!) 167/75   Pulse 86   Temp (!) 38.3 °C (101 °F) (Temporal)   Resp (!) 22   Ht 1.626 m (5' 4\")   Wt 46 kg (101 lb 6.6 oz)   SpO2 (!) 84%   BMI 17.41 kg/m²  Room air O2: 84    Constitutional:  Well developed, No acute distress, Non-toxic appearance.  Talking with her .  HENT: Normocephalic, Atraumatic, Bilateral external ears normal, Oropharynx dry, No oral exudates, Nose normal.   Eyes: PERRL, EOMI, Conjunctiva normal  Neck: Normal range of motion, No tenderness, Supple, No stridor.  No meningeal signs   Cardiovascular: Normal heart rate, Normal rhythm  Thorax & Lungs: Normal breath sounds, No respiratory distress,  No wheezing, No chest tenderness.   Abdomen: Some minimal lower abdominal pain but no focal right lower quadrant tenderness, no distention, no rebound or guarding, no pulsatile mass  Skin: Warm, Dry, No erythema, No rash.   Back: No tenderness, No CVA tenderness.   Extremities: Intact distal pulses, No edema, No tenderness   Neurologic: Alert & oriented x 3, Normal motor function, Normal sensory function, No focal " deficits noted.   Psychiatric: appropriate          Labs  Results for orders placed or performed during the hospital encounter of 08/14/19   Lactic acid (lactate)   Result Value Ref Range    Lactic Acid 1.7 0.5 - 2.0 mmol/L   CBC WITH DIFFERENTIAL   Result Value Ref Range    WBC 12.9 (H) 4.8 - 10.8 K/uL    RBC 5.43 (H) 4.20 - 5.40 M/uL    Hemoglobin 16.5 (H) 12.0 - 16.0 g/dL    Hematocrit 49.6 (H) 37.0 - 47.0 %    MCV 91.3 81.4 - 97.8 fL    MCH 30.4 27.0 - 33.0 pg    MCHC 33.3 (L) 33.6 - 35.0 g/dL    RDW 47.3 35.9 - 50.0 fL    Platelet Count 194 164 - 446 K/uL    MPV 8.8 (L) 9.0 - 12.9 fL    Neutrophils-Polys 90.80 (H) 44.00 - 72.00 %    Lymphocytes 3.60 (L) 22.00 - 41.00 %    Monocytes 4.80 0.00 - 13.40 %    Eosinophils 0.20 0.00 - 6.90 %    Basophils 0.20 0.00 - 1.80 %    Immature Granulocytes 0.40 0.00 - 0.90 %    Nucleated RBC 0.00 /100 WBC    Neutrophils (Absolute) 11.73 (H) 2.00 - 7.15 K/uL    Lymphs (Absolute) 0.47 (L) 1.00 - 4.80 K/uL    Monos (Absolute) 0.62 0.00 - 0.85 K/uL    Eos (Absolute) 0.03 0.00 - 0.51 K/uL    Baso (Absolute) 0.03 0.00 - 0.12 K/uL    Immature Granulocytes (abs) 0.05 0.00 - 0.11 K/uL    NRBC (Absolute) 0.00 K/uL   COMP METABOLIC PANEL   Result Value Ref Range    Sodium 136 135 - 145 mmol/L    Potassium 3.9 3.6 - 5.5 mmol/L    Chloride 100 96 - 112 mmol/L    Co2 26 20 - 33 mmol/L    Anion Gap 10.0 0.0 - 11.9    Glucose 180 (H) 65 - 99 mg/dL    Bun 18 8 - 22 mg/dL    Creatinine 0.77 0.50 - 1.40 mg/dL    Calcium 9.3 8.4 - 10.2 mg/dL    AST(SGOT) 25 12 - 45 U/L    ALT(SGPT) 18 2 - 50 U/L    Alkaline Phosphatase 52 30 - 99 U/L    Total Bilirubin 1.2 0.1 - 1.5 mg/dL    Albumin 4.1 3.2 - 4.9 g/dL    Total Protein 7.1 6.0 - 8.2 g/dL    Globulin 3.0 1.9 - 3.5 g/dL    A-G Ratio 1.4 g/dL   TROPONIN   Result Value Ref Range    Troponin T 16 6 - 19 ng/L   ESTIMATED GFR   Result Value Ref Range    GFR If African American >60 >60 mL/min/1.73 m 2    GFR If Non African American >60 >60 mL/min/1.73 m 2        RADIOLOGY/PROCEDURES  DX-CHEST-PORTABLE (1 VIEW)   Final Result      No acute cardiopulmonary abnormality identified.          COURSE & MEDICAL DECISION MAKING  Pertinent Labs & Imaging studies reviewed. (See chart for details)  Patient presents today with fever diarrhea.  Patient is currently being treated for a urinary tract infection with Macrobid.  This is her second round of antibiotics as the infection has not resolved.  Urine culture from 8/10/2019 was negative.  Patient's heart rate and blood pressure are normal here in the ER.  Sepsis protocol was activated in triage.  Patient has no focal findings on abdominal exam.  Patient is not having any difficulty breathing or chest pain.  However O2 sat in triage was 84%.    Patient is currently on 2 L of O2 and is satting over 94%.  She does not complain of any cough.  X-ray does not show any evidence of pneumonia.  Laboratory studies overall look pretty good.  No evidence of a gap acidosis but her blood sugar is slightly high.  She has an elevated white count but no evidence of bandemia.  Initial lactic acid was normal.  Still waiting for urine at this time.  Patient is receiving fluids.    Urine results have returned and do not show any obvious infection.  Patient has not had any further episodes of diarrhea.  I took the patient off oxygen and her O2 sat is 87% on room air.  Patient states she does not have any previous history of lung problems.  Due to her fever and hypoxia I do not believe patient is stable for outpatient management.  However at this point I have not started antibiotics as there is no evidence of acute sepsis or any obvious source for her fever.  C. difficile is still in the differential but she has not had any diarrhea here so far.    Discussed the case with Dr. Chadwick who will admit the patient.    FINAL IMPRESSION   1. Diarrhea  2. Hypoxia  3. Febrile illness         Electronically signed by: Rhonda Loredo, 8/14/2019 10:02 AM

## 2019-08-15 LAB
ANION GAP SERPL CALC-SCNC: 6 MMOL/L (ref 0–11.9)
BASOPHILS # BLD AUTO: 0.1 % (ref 0–1.8)
BASOPHILS # BLD: 0.01 K/UL (ref 0–0.12)
BUN SERPL-MCNC: 15 MG/DL (ref 8–22)
CALCIUM SERPL-MCNC: 8.5 MG/DL (ref 8.4–10.2)
CHLORIDE SERPL-SCNC: 102 MMOL/L (ref 96–112)
CO2 SERPL-SCNC: 26 MMOL/L (ref 20–33)
CREAT SERPL-MCNC: 0.7 MG/DL (ref 0.5–1.4)
EOSINOPHIL # BLD AUTO: 0.09 K/UL (ref 0–0.51)
EOSINOPHIL NFR BLD: 1.2 % (ref 0–6.9)
ERYTHROCYTE [DISTWIDTH] IN BLOOD BY AUTOMATED COUNT: 47.8 FL (ref 35.9–50)
GLUCOSE SERPL-MCNC: 121 MG/DL (ref 65–99)
HCT VFR BLD AUTO: 42.7 % (ref 37–47)
HGB BLD-MCNC: 14.3 G/DL (ref 12–16)
IMM GRANULOCYTES # BLD AUTO: 0.01 K/UL (ref 0–0.11)
IMM GRANULOCYTES NFR BLD AUTO: 0.1 % (ref 0–0.9)
LYMPHOCYTES # BLD AUTO: 1.07 K/UL (ref 1–4.8)
LYMPHOCYTES NFR BLD: 14.6 % (ref 22–41)
MCH RBC QN AUTO: 30.4 PG (ref 27–33)
MCHC RBC AUTO-ENTMCNC: 33.5 G/DL (ref 33.6–35)
MCV RBC AUTO: 90.7 FL (ref 81.4–97.8)
MONOCYTES # BLD AUTO: 0.4 K/UL (ref 0–0.85)
MONOCYTES NFR BLD AUTO: 5.5 % (ref 0–13.4)
NEUTROPHILS # BLD AUTO: 5.73 K/UL (ref 2–7.15)
NEUTROPHILS NFR BLD: 78.5 % (ref 44–72)
NRBC # BLD AUTO: 0 K/UL
NRBC BLD-RTO: 0 /100 WBC
PLATELET # BLD AUTO: 188 K/UL (ref 164–446)
PMV BLD AUTO: 9.4 FL (ref 9–12.9)
POTASSIUM SERPL-SCNC: 3.3 MMOL/L (ref 3.6–5.5)
PROCALCITONIN SERPL-MCNC: 0.44 NG/ML
RBC # BLD AUTO: 4.71 M/UL (ref 4.2–5.4)
SODIUM SERPL-SCNC: 134 MMOL/L (ref 135–145)
WBC # BLD AUTO: 7.3 K/UL (ref 4.8–10.8)

## 2019-08-15 PROCEDURE — 84145 PROCALCITONIN (PCT): CPT

## 2019-08-15 PROCEDURE — A9270 NON-COVERED ITEM OR SERVICE: HCPCS | Performed by: INTERNAL MEDICINE

## 2019-08-15 PROCEDURE — 700102 HCHG RX REV CODE 250 W/ 637 OVERRIDE(OP): Performed by: INTERNAL MEDICINE

## 2019-08-15 PROCEDURE — 80048 BASIC METABOLIC PNL TOTAL CA: CPT

## 2019-08-15 PROCEDURE — 99233 SBSQ HOSP IP/OBS HIGH 50: CPT | Performed by: HOSPITALIST

## 2019-08-15 PROCEDURE — 700102 HCHG RX REV CODE 250 W/ 637 OVERRIDE(OP): Performed by: HOSPITALIST

## 2019-08-15 PROCEDURE — 85025 COMPLETE CBC W/AUTO DIFF WBC: CPT

## 2019-08-15 PROCEDURE — 36415 COLL VENOUS BLD VENIPUNCTURE: CPT

## 2019-08-15 PROCEDURE — A9270 NON-COVERED ITEM OR SERVICE: HCPCS | Performed by: HOSPITALIST

## 2019-08-15 PROCEDURE — 770006 HCHG ROOM/CARE - MED/SURG/GYN SEMI*

## 2019-08-15 PROCEDURE — 700111 HCHG RX REV CODE 636 W/ 250 OVERRIDE (IP): Performed by: INTERNAL MEDICINE

## 2019-08-15 RX ORDER — POTASSIUM CHLORIDE 20 MEQ/1
40 TABLET, EXTENDED RELEASE ORAL ONCE
Status: COMPLETED | OUTPATIENT
Start: 2019-08-15 | End: 2019-08-15

## 2019-08-15 RX ADMIN — FUROSEMIDE 20 MG: 10 INJECTION, SOLUTION INTRAVENOUS at 05:18

## 2019-08-15 RX ADMIN — BENAZEPRIL HYDROCHLORIDE 40 MG: 10 TABLET ORAL at 05:19

## 2019-08-15 RX ADMIN — LOVASTATIN 10 MG: 20 TABLET ORAL at 20:38

## 2019-08-15 RX ADMIN — POTASSIUM CHLORIDE 40 MEQ: 1500 TABLET, EXTENDED RELEASE ORAL at 11:30

## 2019-08-15 RX ADMIN — AMLODIPINE BESYLATE 5 MG: 5 TABLET ORAL at 05:19

## 2019-08-15 RX ADMIN — ENOXAPARIN SODIUM 40 MG: 100 INJECTION SUBCUTANEOUS at 05:18

## 2019-08-15 RX ADMIN — AMLODIPINE BESYLATE 5 MG: 5 TABLET ORAL at 17:06

## 2019-08-15 ASSESSMENT — ENCOUNTER SYMPTOMS
PALPITATIONS: 0
NAUSEA: 0
PHOTOPHOBIA: 0
MEMORY LOSS: 0
NERVOUS/ANXIOUS: 0
NECK PAIN: 0
WHEEZING: 0
ORTHOPNEA: 0
SENSORY CHANGE: 0
EYE PAIN: 0
CONSTIPATION: 0
CHILLS: 0
SHORTNESS OF BREATH: 1
DIZZINESS: 0
BLOOD IN STOOL: 0
BLURRED VISION: 0
SORE THROAT: 0
HEADACHES: 0
COUGH: 0
HEARTBURN: 0
CLAUDICATION: 0
SPEECH CHANGE: 0
DOUBLE VISION: 0
TINGLING: 0
MYALGIAS: 0
DEPRESSION: 0
FEVER: 0
TREMORS: 0
HEMOPTYSIS: 0
WEAKNESS: 0
VOMITING: 0
PND: 0
STRIDOR: 0
BACK PAIN: 0
SPUTUM PRODUCTION: 0

## 2019-08-15 ASSESSMENT — PATIENT HEALTH QUESTIONNAIRE - PHQ9
1. LITTLE INTEREST OR PLEASURE IN DOING THINGS: NOT AT ALL
SUM OF ALL RESPONSES TO PHQ9 QUESTIONS 1 AND 2: 0
2. FEELING DOWN, DEPRESSED, IRRITABLE, OR HOPELESS: NOT AT ALL

## 2019-08-15 NOTE — PROGRESS NOTES
Patient is alert and oriented.   Denies pain, numbness, tingling, nausea  Denies SOB  Aware of need for stool sample, no BM since yesterday afternoon  Rule out c. Diff isolation precautions in place  Resting comfortably in bed  Hourly rounding, treaded socks on, call light within reach.

## 2019-08-15 NOTE — ASSESSMENT & PLAN NOTE
traige O2 sat RA 84%  Given IVF, rales on exam  Subsequent dry CT shows severe emphysema in bases and fibrosis w/ bronchietasis

## 2019-08-15 NOTE — PROGRESS NOTES
Pt BP elevated 181/61. Dr Mendez notified and meds added to address the situation. CNA given instructions to retake BP 30min after med admin and notify me of the results.

## 2019-08-15 NOTE — PROGRESS NOTES
Hospital Medicine Daily Progress Note    Date of Service  8/15/2019    Chief Complaint  86 y.o. female admitted 8/14/2019 with diarrhea and generalized weakness    Hospital Course    please see HPI,      Interval Problem Update  Patient says her shortness of breath has improved, although still having watery diarrhea, although less in terms of frequency.  Denies having fevers or chills or abdominal pain.  Continue with respiratory therapy protocol  Echocardiogram ordered  IV Lasix      consultants/Specialty  None    Code Status  DNAR/DNI    Disposition  TBD    Review of Systems  Review of Systems   Constitutional: Negative for chills, fever and malaise/fatigue.   HENT: Negative for congestion, hearing loss, sore throat and tinnitus.    Eyes: Negative for blurred vision, double vision, photophobia and pain.   Respiratory: Positive for shortness of breath. Negative for cough, hemoptysis, sputum production, wheezing and stridor.    Cardiovascular: Negative for chest pain, palpitations, orthopnea, claudication and PND.   Gastrointestinal: Negative for blood in stool, constipation, heartburn, melena, nausea and vomiting.   Genitourinary: Negative for dysuria, frequency and urgency.   Musculoskeletal: Negative for back pain, myalgias and neck pain.   Neurological: Negative for dizziness, tingling, tremors, sensory change, speech change, weakness and headaches.   Psychiatric/Behavioral: Negative for depression, memory loss and suicidal ideas. The patient is not nervous/anxious.    All other systems reviewed and are negative.       Physical Exam  Temp:  [36.6 °C (97.8 °F)-38.3 °C (101 °F)] 37 °C (98.6 °F)  Pulse:  [70-92] 80  Resp:  [11-22] 18  BP: (128-182)/(53-76) 128/53  SpO2:  [84 %-97 %] 92 %    Physical Exam   Constitutional: She is oriented to person, place, and time. She appears well-developed and well-nourished. No distress.   HENT:   Head: Normocephalic and atraumatic.   Mouth/Throat: No oropharyngeal exudate.    Eyes: Pupils are equal, round, and reactive to light. Conjunctivae are normal. Right eye exhibits no discharge. No scleral icterus.   Neck: Neck supple. No JVD present. No thyromegaly present.   Cardiovascular: Normal rate and intact distal pulses.   No murmur heard.  Pulmonary/Chest: Effort normal. No stridor. No respiratory distress. She has no wheezes. She has rales.   Fine crackles b/l lung bases   Abdominal: Soft. Bowel sounds are normal. She exhibits no distension. There is no tenderness. There is no rebound.   Musculoskeletal: Normal range of motion. She exhibits no edema.   Neurological: She is alert and oriented to person, place, and time. No cranial nerve deficit.   Skin: Skin is warm. She is not diaphoretic. No erythema.   Psychiatric: She has a normal mood and affect. Her behavior is normal. Thought content normal.   Nursing note and vitals reviewed.      Fluids    Intake/Output Summary (Last 24 hours) at 8/15/2019 0717  Last data filed at 8/14/2019 2000  Gross per 24 hour   Intake 240 ml   Output 200 ml   Net 40 ml       Laboratory  Recent Labs     08/14/19  1015 08/15/19  0433   WBC 12.9* 7.3   RBC 5.43* 4.71   HEMOGLOBIN 16.5* 14.3   HEMATOCRIT 49.6* 42.7   MCV 91.3 90.7   MCH 30.4 30.4   MCHC 33.3* 33.5*   RDW 47.3 47.8   PLATELETCT 194 188   MPV 8.8* 9.4     Recent Labs     08/14/19  1015 08/15/19  0433   SODIUM 136 134*   POTASSIUM 3.9 3.3*   CHLORIDE 100 102   CO2 26 26   GLUCOSE 180* 121*   BUN 18 15   CREATININE 0.77 0.70   CALCIUM 9.3 8.5                   Imaging  CT-CHEST (THORAX) W/O   Final Result      1.  Severe emphysematous change of the lungs with bullous change within the lung bases.      2.  Fibrosis within the lung bases bilaterally. There are also multiple areas of bronchiectasis with pulmonary scarring and pleural thickening.      3.  4 mm regular pulmonary nodule within the right lower lobe superior segment with another small 3 mm nodule within the right lower lobe inferiorly.       4.  Severe atherosclerotic change of the aorta and coronary vessels.         Low Risk: No routine follow-up      High Risk: Optional CT at 12 months      Comments: Use most suspicious nodule as guide to management. Follow-up intervals may vary according to size and risk.      Low Risk - Minimal or absent history of smoking and of other known risk factors.      High Risk - History of smoking or of other known risk factors.      Note: These recommendations do not apply to lung cancer screening, patients with immunosuppression, or patients with known primary cancer.      Fleischner Society 2017 Guidelines for Management of Incidentally Detected Pulmonary Nodules in Adults      DX-CHEST-PORTABLE (1 VIEW)   Final Result      No acute cardiopulmonary abnormality identified.           Assessment/Plan  * Acute respiratory failure with hypoxemia (HCC)  Assessment & Plan  Hypoxemic on admission. 84% on RA.  CT chest shows Severe emphysematous change of the lungs with bullous change within the lung bases.  Check procalcitonin levels.  Echocardiogram ordered, as elevated proBNP  Continue RT protocol, duo nebs, Pep therapy if warranted, and incentive spirometry.     Essential hypertension  Assessment & Plan  Better controlled today  Resume Norvasc.    Diarrhea  Assessment & Plan  Resolved.  CDIFF PCR pending.    COPD (chronic obstructive pulmonary disease) (HCC)- (present on admission)  Assessment & Plan  Possibly COPD vs new onset Heart failure?  Elevated probnp, responded well to lasix.   Possibly acute exacerbation related to chronic COPD/Fibrosis.   RT protocol for now.     Lung Nodule (HCC)- (present on admission)   Assessment & Plan        CT shows  4 mm regular pulmonary nodule within the right lower lobe superior segment with another small 3        mm nodule within the right lower lobe inferiorly.       Needs outpatient follow up with CT in 3-6 months.       The total time spent face to face with this patient was  about 38 mins of which 60% of time was spent on counseling, review of records including pertinent lab data and studies, as well as discussing diagnostic evaluation and work up, planned therapeutic interventions and future disposition of care. Where indicated, the assessment and plan reflect discussion of patient with consultants, other healthcare providers, family members, and additional research needed to obtain further information in formulating the plan of care of this patient.         VTE prophylaxis: Lovenox

## 2019-08-15 NOTE — ASSESSMENT & PLAN NOTE
CXR w/ bilateral flattening of diaphragms- subsequent CT severe lower lobe bullous empysema  No home meds  Non smoker  No wheezing/ exacerbation

## 2019-08-15 NOTE — H&P
Hospital Medicine History & Physical Note    Date of Service  8/14/2019    Primary Care Physician  Mariya Bhardwaj M.D.    Consultants  none    Code Status  DNR/I    Chief Complaint  Diarrhea, weakness    History of Presenting Illness  86 y.o. female who presented 8/14/2019 with complaints of diarrhea she had 2 episodes of almost watery diarrhea yesterday evening this was  associated with minimal abdominal pain, no cramping, she did have incontinence of the second episode.  She did not notice any significant odor associated with the diarrhea.  She has not had fever or chills.  Patient had recently resumed a course of Macrobid but she had course of this last month for presumed urinary tract infection.  It should be noted that she did not present to her primary care doctor with any urinary symptoms just complaining of increased weakness fatigue and malaise.  Recent culture was negative for any growth.  In fact urinalysis on 8/10 was rather benign.    When she came into triage her O2 saturation was 84% on room air.  She denies dizziness, chest pain, fever, sweats, chills she has had a dry cough but no sputum.  She was evaluated in May of this year for some chest pain.  She had one transient episode since but not within the last couple of days.  She said she had originally had some increased urinary frequency and nocturia but not recently.  She denies any palpitations.  She is never been on oxygen and is on no inhalers or medications for COPD.    Her appetite has been poor but she does not think she is lost any weight recently.  She denies nausea vomiting or headache    Review of Systems  Review of Systems   Constitutional: Positive for malaise/fatigue. Negative for chills, diaphoresis, fever and weight loss.   HENT: Negative for congestion and sore throat.    Respiratory: Positive for cough. Negative for sputum production and shortness of breath.    Cardiovascular: Negative for chest pain.   Gastrointestinal:  Positive for abdominal pain and diarrhea. Negative for nausea and vomiting.   Genitourinary: Negative for dysuria, frequency and urgency.   Musculoskeletal: Negative for joint pain.   Skin: Negative for itching and rash.   Neurological: Negative for dizziness and headaches.   Psychiatric/Behavioral: The patient is not nervous/anxious.        Past Medical History  Arthritis    Surgical History  Hysterectomy, R hip    Family History  HTN  ? Breast CA  DM  Heart disease     Social History  Social smoker- quit    3 sons ( 1 local, 1 in [place], 1 in [place])    Allergies  Allergies   Allergen Reactions   • Augmentin [Amoxicillin-Pot Clavulanate] Hives   • Bactrim [Sulfamethoxazole W-Trimethoprim] Hives   • Diclofenac Swelling   • Codeine Vomiting and Nausea       Medications  Prior to Admission Medications   Prescriptions Last Dose Informant Patient Reported? Taking?   Cholecalciferol (VITAMIN D3) 1000 units Cap 8/13/2019 at am Patient Yes No   Sig: Take 1,000 Units by mouth every morning.   acetaminophen (TYLENOL) 500 MG Tab prn at unk Patient Yes Yes   Sig: Take 1,000 mg by mouth every 6 hours as needed (headache).   benazepril (LOTENSIN) 40 MG tablet 8/14/2019 at am Patient No No   Sig: Take 1 Tab by mouth every day.   lovastatin (MEVACOR) 10 MG tablet 8/13/2019 at pm Patient Yes No   Sig: Take 10 mg by mouth every evening.   nitrofurantoin monohyd macro (MACROBID) 100 MG Cap 8/13/2019 at 2000 Patient Yes No   Sig: Take 100 mg by mouth 2 Times a Day. 10 day course      Facility-Administered Medications: None       Physical Exam  Temp:  [36.6 °C (97.8 °F)-38.3 °C (101 °F)] 36.6 °C (97.8 °F)  Pulse:  [70-92] 86  Resp:  [11-22] 18  BP: (144-182)/(61-76) 182/76  SpO2:  [84 %-97 %] 86 %    Physical Exam   Constitutional: She is oriented to person, place, and time. She appears well-developed. No distress.   Frail thin nontoxic   HENT:   Head: Normocephalic and atraumatic.   Mouth/Throat: Oropharynx is clear and moist.   Eyes:  Pupils are equal, round, and reactive to light. Conjunctivae and EOM are normal. Right eye exhibits no discharge. Left eye exhibits no discharge. No scleral icterus.   Neck: Neck supple.   Cardiovascular: Normal rate and regular rhythm.   Pulmonary/Chest: Effort normal. No respiratory distress. She has no wheezes. She has rales. She exhibits no tenderness.   Reduced in the apices bibasilar rales   Abdominal: Soft. Bowel sounds are normal. She exhibits no distension. There is no tenderness.   Musculoskeletal: She exhibits no edema or tenderness.   Neurological: She is alert and oriented to person, place, and time. No cranial nerve deficit.   Skin: Skin is warm and dry. She is not diaphoretic. There is pallor.   Psychiatric: She has a normal mood and affect.   Nursing note and vitals reviewed.      Laboratory:  Recent Labs     08/14/19  1015   WBC 12.9*   RBC 5.43*   HEMOGLOBIN 16.5*   HEMATOCRIT 49.6*   MCV 91.3   MCH 30.4   MCHC 33.3*   RDW 47.3   PLATELETCT 194   MPV 8.8*     Recent Labs     08/14/19  1015   SODIUM 136   POTASSIUM 3.9   CHLORIDE 100   CO2 26   GLUCOSE 180*   BUN 18   CREATININE 0.77   CALCIUM 9.3     Recent Labs     08/14/19  1015   ALTSGPT 18   ASTSGOT 25   ALKPHOSPHAT 52   TBILIRUBIN 1.2   GLUCOSE 180*         Recent Labs     08/14/19  1015   NTPROBNP 728*         Recent Labs     08/14/19  1015   TROPONINT 16       Urinalysis:    Recent Labs     08/14/19  1220   SPECGRAVITY 1.015   GLUCOSEUR Negative   KETONES Negative   NITRITE Negative   LEUKESTERAS Negative   WBCURINE 2-5   RBCURINE 0-2   BACTERIA Rare*   EPITHELCELL Rare        Imaging:  CT-CHEST (THORAX) W/O   Final Result      1.  Severe emphysematous change of the lungs with bullous change within the lung bases.      2.  Fibrosis within the lung bases bilaterally. There are also multiple areas of bronchiectasis with pulmonary scarring and pleural thickening.      3.  4 mm regular pulmonary nodule within the right lower lobe superior  segment with another small 3 mm nodule within the right lower lobe inferiorly.      4.  Severe atherosclerotic change of the aorta and coronary vessels.         Low Risk: No routine follow-up      High Risk: Optional CT at 12 months      Comments: Use most suspicious nodule as guide to management. Follow-up intervals may vary according to size and risk.      Low Risk - Minimal or absent history of smoking and of other known risk factors.      High Risk - History of smoking or of other known risk factors.      Note: These recommendations do not apply to lung cancer screening, patients with immunosuppression, or patients with known primary cancer.      Fleischner Society 2017 Guidelines for Management of Incidentally Detected Pulmonary Nodules in Adults      DX-CHEST-PORTABLE (1 VIEW)   Final Result      No acute cardiopulmonary abnormality identified.            Assessment/Plan:  I anticipate this patient will require at least two midnights for appropriate medical management, necessitating inpatient admission.    Essential hypertension  Assessment & Plan  Not controlled  Add norvasc    Acute respiratory failure with hypoxemia (HCC)  Assessment & Plan  traige O2 sat RA 84%  Given IVF, rales on exam  Subsequent dry CT shows severe emphysema in bases and fibrosis w/ bronchietasis    Diarrhea  Assessment & Plan  ? Resolved  Risk for C dif 2/2 Macrobid recurrent courses  Isolation, C dif assay  No bowel protocol    COPD (chronic obstructive pulmonary disease) (HCC)- (present on admission)  Assessment & Plan  CXR w/ bilateral flattening of diaphragms- subsequent CT severe lower lobe bullous empysema  No home meds  Non smoker  No wheezing/ exacerbation        VTE prophylaxis: Lovenox

## 2019-08-15 NOTE — CARE PLAN
Problem: Communication  Goal: The ability to communicate needs accurately and effectively will improve  Outcome: PROGRESSING AS EXPECTED  Note:   Plan of care discussed, pt verbalizes understanding     Problem: Safety  Goal: Will remain free from injury  Outcome: PROGRESSING AS EXPECTED  Note:   Treaded socks on, call light within reach, hourly rounding, isolation precautions in place     Problem: Bowel/Gastric:  Goal: Normal bowel function is maintained or improved  Outcome: PROGRESSING AS EXPECTED  Note:   Denies nausea or abdominal pain, aware of need for stool sample

## 2019-08-15 NOTE — ASSESSMENT & PLAN NOTE
? Resolved  Risk for C dif 2/2 Macrobid recurrent courses  Isolation, C dif assay  No bowel protocol

## 2019-08-16 ENCOUNTER — APPOINTMENT (OUTPATIENT)
Dept: CARDIOLOGY | Facility: MEDICAL CENTER | Age: 84
DRG: 189 | End: 2019-08-16
Attending: HOSPITALIST
Payer: MEDICARE

## 2019-08-16 ENCOUNTER — PATIENT OUTREACH (OUTPATIENT)
Dept: HEALTH INFORMATION MANAGEMENT | Facility: OTHER | Age: 84
End: 2019-08-16

## 2019-08-16 VITALS
SYSTOLIC BLOOD PRESSURE: 143 MMHG | OXYGEN SATURATION: 92 % | DIASTOLIC BLOOD PRESSURE: 82 MMHG | BODY MASS INDEX: 17.31 KG/M2 | WEIGHT: 101.41 LBS | HEIGHT: 64 IN | TEMPERATURE: 98.3 F | RESPIRATION RATE: 18 BRPM | HEART RATE: 77 BPM

## 2019-08-16 PROBLEM — R19.7 DIARRHEA: Status: RESOLVED | Noted: 2019-08-14 | Resolved: 2019-08-16

## 2019-08-16 PROBLEM — J96.01 ACUTE RESPIRATORY FAILURE WITH HYPOXEMIA (HCC): Status: RESOLVED | Noted: 2019-08-14 | Resolved: 2019-08-16

## 2019-08-16 LAB
ALBUMIN SERPL BCP-MCNC: 3.2 G/DL (ref 3.2–4.9)
ALBUMIN/GLOB SERPL: 1.1 G/DL
ALP SERPL-CCNC: 43 U/L (ref 30–99)
ALT SERPL-CCNC: 15 U/L (ref 2–50)
ANION GAP SERPL CALC-SCNC: 8 MMOL/L (ref 0–11.9)
AST SERPL-CCNC: 19 U/L (ref 12–45)
BACTERIA UR CULT: NORMAL
BILIRUB SERPL-MCNC: 0.7 MG/DL (ref 0.1–1.5)
BUN SERPL-MCNC: 22 MG/DL (ref 8–22)
CALCIUM SERPL-MCNC: 8.6 MG/DL (ref 8.4–10.2)
CHLORIDE SERPL-SCNC: 101 MMOL/L (ref 96–112)
CO2 SERPL-SCNC: 27 MMOL/L (ref 20–33)
CREAT SERPL-MCNC: 0.69 MG/DL (ref 0.5–1.4)
ERYTHROCYTE [DISTWIDTH] IN BLOOD BY AUTOMATED COUNT: 48.9 FL (ref 35.9–50)
GLOBULIN SER CALC-MCNC: 2.9 G/DL (ref 1.9–3.5)
GLUCOSE SERPL-MCNC: 99 MG/DL (ref 65–99)
HCT VFR BLD AUTO: 46.2 % (ref 37–47)
HGB BLD-MCNC: 15.4 G/DL (ref 12–16)
LV EJECT FRACT  99904: 70
LV EJECT FRACT MOD 2C 99903: 72.3
LV EJECT FRACT MOD 4C 99902: 68.75
LV EJECT FRACT MOD BP 99901: 66.72
MCH RBC QN AUTO: 30.7 PG (ref 27–33)
MCHC RBC AUTO-ENTMCNC: 33.3 G/DL (ref 33.6–35)
MCV RBC AUTO: 92 FL (ref 81.4–97.8)
PLATELET # BLD AUTO: 213 K/UL (ref 164–446)
PMV BLD AUTO: 9.3 FL (ref 9–12.9)
POTASSIUM SERPL-SCNC: 4 MMOL/L (ref 3.6–5.5)
PROT SERPL-MCNC: 6.1 G/DL (ref 6–8.2)
RBC # BLD AUTO: 5.02 M/UL (ref 4.2–5.4)
SIGNIFICANT IND 70042: NORMAL
SITE SITE: NORMAL
SODIUM SERPL-SCNC: 136 MMOL/L (ref 135–145)
SOURCE SOURCE: NORMAL
WBC # BLD AUTO: 7.1 K/UL (ref 4.8–10.8)

## 2019-08-16 PROCEDURE — 99239 HOSP IP/OBS DSCHRG MGMT >30: CPT | Performed by: HOSPITALIST

## 2019-08-16 PROCEDURE — 93306 TTE W/DOPPLER COMPLETE: CPT

## 2019-08-16 PROCEDURE — 36415 COLL VENOUS BLD VENIPUNCTURE: CPT

## 2019-08-16 PROCEDURE — 93306 TTE W/DOPPLER COMPLETE: CPT | Mod: 26 | Performed by: INTERNAL MEDICINE

## 2019-08-16 PROCEDURE — 85027 COMPLETE CBC AUTOMATED: CPT

## 2019-08-16 PROCEDURE — 80053 COMPREHEN METABOLIC PANEL: CPT

## 2019-08-16 PROCEDURE — A9270 NON-COVERED ITEM OR SERVICE: HCPCS | Performed by: INTERNAL MEDICINE

## 2019-08-16 PROCEDURE — 700102 HCHG RX REV CODE 250 W/ 637 OVERRIDE(OP): Performed by: INTERNAL MEDICINE

## 2019-08-16 PROCEDURE — 700102 HCHG RX REV CODE 250 W/ 637 OVERRIDE(OP): Performed by: HOSPITALIST

## 2019-08-16 PROCEDURE — 700111 HCHG RX REV CODE 636 W/ 250 OVERRIDE (IP): Performed by: INTERNAL MEDICINE

## 2019-08-16 PROCEDURE — A9270 NON-COVERED ITEM OR SERVICE: HCPCS | Performed by: HOSPITALIST

## 2019-08-16 RX ORDER — DOXYCYCLINE 100 MG/1
100 TABLET ORAL EVERY 12 HOURS
Status: DISCONTINUED | OUTPATIENT
Start: 2019-08-16 | End: 2019-08-16 | Stop reason: HOSPADM

## 2019-08-16 RX ORDER — DOXYCYCLINE 100 MG/1
100 TABLET ORAL EVERY 12 HOURS
Qty: 10 TAB | Refills: 0 | Status: SHIPPED | OUTPATIENT
Start: 2019-08-16 | End: 2019-08-21

## 2019-08-16 RX ADMIN — DOXYCYCLINE 100 MG: 100 TABLET, FILM COATED ORAL at 08:35

## 2019-08-16 RX ADMIN — FUROSEMIDE 20 MG: 10 INJECTION, SOLUTION INTRAVENOUS at 05:21

## 2019-08-16 RX ADMIN — ENOXAPARIN SODIUM 40 MG: 100 INJECTION SUBCUTANEOUS at 05:20

## 2019-08-16 RX ADMIN — AMLODIPINE BESYLATE 5 MG: 5 TABLET ORAL at 05:21

## 2019-08-16 RX ADMIN — BENAZEPRIL HYDROCHLORIDE 40 MG: 10 TABLET ORAL at 05:21

## 2019-08-16 NOTE — CARE PLAN
PLAN OF CARE REVIEWED WITH PATIENT. PATIENT VERBALIZES UNDERSTANDING. BED ALARM ON AND CALL LIGHT WITHIN REACH.  PATIENT DENIES BM/DIARRHEA: C-DIFF PRECAUTIONS ONGOING

## 2019-08-16 NOTE — DISCHARGE INSTRUCTIONS
Discharge Instructions    Discharged to home by car with relative. Discharged via wheelchair, hospital escort: Yes.  Special equipment needed: Not Applicable    Be sure to schedule a follow-up appointment with your primary care doctor or any specialists as instructed.     Discharge Plan:   Influenza Vaccine Indication: Patient Refuses    I understand that a diet low in cholesterol, fat, and sodium is recommended for good health. Unless I have been given specific instructions below for another diet, I accept this instruction as my diet prescription.   Other diet: Resume home diet    Special Instructions:     Doxycycline tablets or capsules  What is this medicine?  DOXYCYCLINE (dox rima PITER angel) is a tetracycline antibiotic. It kills certain bacteria or stops their growth. It is used to treat many kinds of infections, like dental, skin, respiratory, and urinary tract infections. It also treats acne, Lyme disease, malaria, and certain sexually transmitted infections.  This medicine may be used for other purposes; ask your health care provider or pharmacist if you have questions.  COMMON BRAND NAME(S): Acticlate, Adoxa, Adoxa CK, Adoxa Delon, Adoxa TT, Alodox, Avidoxy, Doxal, Mondoxyne NL, Monodox, Morgidox 1x, Morgidox 1x Kit, Morgidox 2x, Morgidox 2x Kit, NutriDox, Ocudox, TARGADOX, Vibra-Tabs, Vibramycin  What should I tell my health care provider before I take this medicine?  They need to know if you have any of these conditions:  -liver disease  -long exposure to sunlight like working outdoors  -stomach problems like colitis  -an unusual or allergic reaction to doxycycline, tetracycline antibiotics, other medicines, foods, dyes, or preservatives  -pregnant or trying to get pregnant  -breast-feeding  How should I use this medicine?  Take this medicine by mouth with a full glass of water. Follow the directions on the prescription label. It is best to take this medicine without food, but if it upsets your stomach take it  with food. Take your medicine at regular intervals. Do not take your medicine more often than directed. Take all of your medicine as directed even if you think you are better. Do not skip doses or stop your medicine early.  Talk to your pediatrician regarding the use of this medicine in children. While this drug may be prescribed for selected conditions, precautions do apply.  Overdosage: If you think you have taken too much of this medicine contact a poison control center or emergency room at once.  NOTE: This medicine is only for you. Do not share this medicine with others.  What if I miss a dose?  If you miss a dose, take it as soon as you can. If it is almost time for your next dose, take only that dose. Do not take double or extra doses.  What may interact with this medicine?  -antacids  -barbiturates  -birth control pills  -bismuth subsalicylate  -carbamazepine  -methoxyflurane  -other antibiotics  -phenytoin  -vitamins that contain iron  -warfarin  This list may not describe all possible interactions. Give your health care provider a list of all the medicines, herbs, non-prescription drugs, or dietary supplements you use. Also tell them if you smoke, drink alcohol, or use illegal drugs. Some items may interact with your medicine.  What should I watch for while using this medicine?  Tell your doctor or health care professional if your symptoms do not improve.  Do not treat diarrhea with over the counter products. Contact your doctor if you have diarrhea that lasts more than 2 days or if it is severe and watery.  Do not take this medicine just before going to bed. It may not dissolve properly when you lay down and can cause pain in your throat. Drink plenty of fluids while taking this medicine to also help reduce irritation in your throat.  This medicine can make you more sensitive to the sun. Keep out of the sun. If you cannot avoid being in the sun, wear protective clothing and use sunscreen. Do not use sun  lamps or tanning beds/booths.  Birth control pills may not work properly while you are taking this medicine. Talk to your doctor about using an extra method of birth control.  If you are being treated for a sexually transmitted infection, avoid sexual contact until you have finished your treatment. Your sexual partner may also need treatment.  Avoid antacids, aluminum, calcium, magnesium, and iron products for 4 hours before and 2 hours after taking a dose of this medicine.  If you are using this medicine to prevent malaria, you should still protect yourself from contact with mosquitos. Stay in screened-in areas, use mosquito nets, keep your body covered, and use an insect repellent.  What side effects may I notice from receiving this medicine?  Side effects that you should report to your doctor or health care professional as soon as possible:  -allergic reactions like skin rash, itching or hives, swelling of the face, lips, or tongue  -difficulty breathing  -fever  -itching in the rectal or genital area  -pain on swallowing  -redness, blistering, peeling or loosening of the skin, including inside the mouth  -severe stomach pain or cramps  -unusual bleeding or bruising  -unusually weak or tired  -yellowing of the eyes or skin  Side effects that usually do not require medical attention (report to your doctor or health care professional if they continue or are bothersome):  -diarrhea  -loss of appetite  -nausea, vomiting  This list may not describe all possible side effects. Call your doctor for medical advice about side effects. You may report side effects to FDA at 0-429-FDA-4233.  Where should I keep my medicine?  Keep out of the reach of children.  Store at room temperature, below 30 degrees C (86 degrees F). Protect from light. Keep container tightly closed. Throw away any unused medicine after the expiration date. Taking this medicine after the expiration date can make you seriously ill.  NOTE: This sheet is a  summary. It may not cover all possible information. If you have questions about this medicine, talk to your doctor, pharmacist, or health care provider.  © 2018 Elsevier/Gold Standard (2017-01-18 17:11:22)      · Is patient discharged on Warfarin / Coumadin?   No     Depression / Suicide Risk    As you are discharged from this [facility] facility, it is important to learn how to keep safe from harming yourself.    Recognize the warning signs:  · Abrupt changes in personality, positive or negative- including increase in energy   · Giving away possessions  · Change in eating patterns- significant weight changes-  positive or negative  · Change in sleeping patterns- unable to sleep or sleeping all the time   · Unwillingness or inability to communicate  · Depression  · Unusual sadness, discouragement and loneliness  · Talk of wanting to die  · Neglect of personal appearance   · Rebelliousness- reckless behavior  · Withdrawal from people/activities they love  · Confusion- inability to concentrate     If you or a loved one observes any of these behaviors or has concerns about self-harm, here's what you can do:  · Talk about it- your feelings and reasons for harming yourself  · Remove any means that you might use to hurt yourself (examples: pills, rope, extension cords, firearm)  · Get professional help from the community (Mental Health, Substance Abuse, psychological counseling)  · Do not be alone:Call your Safe Contact- someone whom you trust who will be there for you.  · Call your local CRISIS HOTLINE 484-9201 or 534-987-1497  · Call your local Children's Mobile Crisis Response Team [place] (471) 866-1265 or www."[employer]  · Call the toll free National Suicide Prevention Hotlines   · National Suicide Prevention Lifeline 641-001-YASC (3308)  · National Hope Line Network 800-SUICIDE (329-6242)

## 2019-08-16 NOTE — DISCHARGE SUMMARY
Discharge Summary    CHIEF COMPLAINT ON ADMISSION  Chief Complaint   Patient presents with   • Diarrhea     started this morning   • Fever     started with chills 2 days ago off and on.   • UTI     pt is on second round of macrobid for UTI       Reason for Admission  Diarrhea     Admission Date  8/14/2019    CODE STATUS  DNAR/DNI    HPI & HOSPITAL COURSE  Is a very pleasant 86-year-old female with a history of COPD, hypertension, was admitted for evaluation of generalized weakness and diarrhea.  Apparently patient has been on Macrobid for several days for urinary infection.  In addition to this patient also was found to be hypoxemic with a oximetry of 44% on room air.  As a result CT of her thorax was performed which only showed evidence of severe emphysema.  As result her stool was sent for C. difficile rule out.  Unfortunately patient has had formed stools over the past 2 days, hence the test was not performed.  She denies having any abdominal pain.  She also says that her shortness of breath has significantly improved, she is no longer hypoxic.  We did have her take several days of IV Lasix given elevated proBNP.  However echocardiogram shows her LVEF function of 70%.  Her only positive finding was elevated procalcitonin.  As a result patient will be continued on 5 days of oral doxycycline just in case to cover possible even early pneumonia.  I have recommended outpatient follow-up within 1 week with her primary care physician.    Therefore, she is discharged in good and stable condition to home with close outpatient follow-up.    The patient recovered much more quickly than anticipated on admission.    Discharge Date  8/16/2019    FOLLOW UP ITEMS POST DISCHARGE  PCP in 1 week    DISCHARGE DIAGNOSES  Principal Problem (Resolved):    Acute respiratory failure with hypoxemia (HCC) POA: Unknown  Active Problems:    COPD (chronic obstructive pulmonary disease) (HCC) POA: Yes    Essential hypertension POA:  Unknown  Resolved Problems:    Diarrhea POA: Unknown      FOLLOW UP  No future appointments.  Mariya Bhardwaj M.D.  [id number]  [id number]  [place] [address]  [phone]    Schedule an appointment as soon as possible for a visit in 1 week  Hospital follow-up appointment with PCP      MEDICATIONS ON DISCHARGE     Medication List      START taking these medications      Instructions   doxycycline monohydrate 100 MG tablet  Commonly known as:  ADOXA   Take 1 Tab by mouth every 12 hours for 5 days.  Dose:  100 mg        CONTINUE taking these medications      Instructions   benazepril 40 MG tablet  Commonly known as:  LOTENSIN   Take 1 Tab by mouth every day.  Dose:  40 mg     lovastatin 10 MG tablet  Commonly known as:  MEVACOR   Take 10 mg by mouth every evening.  Dose:  10 mg     Vitamin D3 1000 units Caps   Take 1,000 Units by mouth every morning.  Dose:  1,000 Units        STOP taking these medications    acetaminophen 500 MG Tabs  Commonly known as:  TYLENOL     nitrofurantoin monohyd macro 100 MG Caps  Commonly known as:  MACROBID            Allergies  Allergies   Allergen Reactions   • Augmentin [Amoxicillin-Pot Clavulanate] Hives   • Bactrim [Sulfamethoxazole W-Trimethoprim] Hives   • Diclofenac Swelling   • Codeine Vomiting and Nausea       DIET  Orders Placed This Encounter   Procedures   • Diet Order Regular     Standing Status:   Standing     Number of Occurrences:   1     Order Specific Question:   Diet:     Answer:   Regular [1]       ACTIVITY  As tolerated.  Weight bearing as tolerated    CONSULTATIONS  None    PROCEDURES  None    LABORATORY  Lab Results   Component Value Date    SODIUM 136 08/16/2019    POTASSIUM 4.0 08/16/2019    CHLORIDE 101 08/16/2019    CO2 27 08/16/2019    GLUCOSE 99 08/16/2019    BUN 22 08/16/2019    CREATININE 0.69 08/16/2019        Lab Results   Component Value Date    WBC 7.1 08/16/2019    HEMOGLOBIN 15.4 08/16/2019    HEMATOCRIT 46.2 08/16/2019    PLATELETCT 213 08/16/2019         Total time of the discharge process exceeds 35 minutes.

## 2019-08-19 LAB
BACTERIA BLD CULT: NORMAL
BACTERIA BLD CULT: NORMAL
SIGNIFICANT IND 70042: NORMAL
SIGNIFICANT IND 70042: NORMAL
SITE SITE: NORMAL
SITE SITE: NORMAL
SOURCE SOURCE: NORMAL
SOURCE SOURCE: NORMAL

## 2019-08-26 ENCOUNTER — HOSPITAL ENCOUNTER (OUTPATIENT)
Dept: LAB | Facility: MEDICAL CENTER | Age: 84
End: 2019-08-26
Attending: FAMILY MEDICINE
Payer: MEDICARE

## 2019-08-26 LAB
APPEARANCE UR: CLEAR
BILIRUB UR QL STRIP.AUTO: NEGATIVE
COLOR UR: YELLOW
GLUCOSE UR STRIP.AUTO-MCNC: NEGATIVE MG/DL
KETONES UR STRIP.AUTO-MCNC: NEGATIVE MG/DL
LEUKOCYTE ESTERASE UR QL STRIP.AUTO: NEGATIVE
MICRO URNS: NORMAL
NITRITE UR QL STRIP.AUTO: NEGATIVE
PH UR STRIP.AUTO: 6 [PH] (ref 5–8)
PROT UR QL STRIP: NEGATIVE MG/DL
RBC UR QL AUTO: NEGATIVE
SP GR UR STRIP.AUTO: 1.02
UROBILINOGEN UR STRIP.AUTO-MCNC: 0.2 MG/DL

## 2019-08-26 PROCEDURE — 81003 URINALYSIS AUTO W/O SCOPE: CPT

## 2019-08-26 PROCEDURE — 87086 URINE CULTURE/COLONY COUNT: CPT

## 2019-08-28 LAB
BACTERIA UR CULT: NORMAL
SIGNIFICANT IND 70042: NORMAL
SITE SITE: NORMAL
SOURCE SOURCE: NORMAL

## 2020-06-04 ENCOUNTER — APPOINTMENT (OUTPATIENT)
Dept: RADIOLOGY | Facility: MEDICAL CENTER | Age: 85
End: 2020-06-04
Attending: EMERGENCY MEDICINE
Payer: MEDICARE

## 2020-06-04 ENCOUNTER — HOSPITAL ENCOUNTER (EMERGENCY)
Facility: MEDICAL CENTER | Age: 85
End: 2020-06-04
Attending: EMERGENCY MEDICINE
Payer: MEDICARE

## 2020-06-04 VITALS
DIASTOLIC BLOOD PRESSURE: 88 MMHG | HEART RATE: 71 BPM | BODY MASS INDEX: 16.53 KG/M2 | WEIGHT: 99.21 LBS | HEIGHT: 65 IN | TEMPERATURE: 98 F | RESPIRATION RATE: 18 BRPM | OXYGEN SATURATION: 91 % | SYSTOLIC BLOOD PRESSURE: 156 MMHG

## 2020-06-04 DIAGNOSIS — S80.211A ABRASION OF RIGHT KNEE, INITIAL ENCOUNTER: ICD-10-CM

## 2020-06-04 DIAGNOSIS — S01.81XA FACIAL LACERATION, INITIAL ENCOUNTER: ICD-10-CM
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
## 2020-06-04 LAB — EKG IMPRESSION: NORMAL

## 2020-06-04 PROCEDURE — 70450 CT HEAD/BRAIN W/O DYE: CPT

## 2020-06-04 PROCEDURE — 73110 X-RAY EXAM OF WRIST: CPT | Mod: RT

## 2020-06-04 PROCEDURE — 303485 HCHG DRESSING MEDIUM

## 2020-06-04 PROCEDURE — 304217 HCHG IRRIGATION SYSTEM

## 2020-06-04 PROCEDURE — 700102 HCHG RX REV CODE 250 W/ 637 OVERRIDE(OP): Performed by: EMERGENCY MEDICINE

## 2020-06-04 PROCEDURE — 304999 HCHG REPAIR-SIMPLE/INTERMED LEVEL 1

## 2020-06-04 PROCEDURE — 303353 HCHG DERMABOND SKIN ADHESIVE

## 2020-06-04 PROCEDURE — 93005 ELECTROCARDIOGRAM TRACING: CPT | Mod: XU | Performed by: EMERGENCY MEDICINE

## 2020-06-04 PROCEDURE — 99284 EMERGENCY DEPT VISIT MOD MDM: CPT

## 2020-06-04 PROCEDURE — 73030 X-RAY EXAM OF SHOULDER: CPT | Mod: RT

## 2020-06-04 PROCEDURE — 70486 CT MAXILLOFACIAL W/O DYE: CPT

## 2020-06-04 PROCEDURE — A9270 NON-COVERED ITEM OR SERVICE: HCPCS | Performed by: EMERGENCY MEDICINE

## 2020-06-04 RX ORDER — ACETAMINOPHEN 325 MG/1
650 TABLET ORAL ONCE
Status: COMPLETED | OUTPATIENT
Start: 2020-06-04 | End: 2020-06-04

## 2020-06-04 RX ADMIN — ACETAMINOPHEN 650 MG: 325 TABLET, FILM COATED ORAL at 21:54

## 2020-06-04 ASSESSMENT — FIBROSIS 4 INDEX: FIB4 SCORE: 2

## 2020-06-05 NOTE — ED TRIAGE NOTES
"Earlier this evening, while in her driveway, pt lost her footing and fell to the ground.  C/O right facial laceration with a swelled lateral orbit.  Pt denies any domestic high risk areas, or international travel within the past 14 days.    Chief Complaint   Patient presents with   • T-5000 FALL   • Facial Injury     BP (!) 170/84   Pulse 97   Temp 36.7 °C (98 °F) (Temporal)   Resp 20   Ht 1.651 m (5' 5\")   Wt 45 kg (99 lb 3.3 oz)   SpO2 91%   BMI 16.51 kg/m²     "

## 2020-06-05 NOTE — DISCHARGE INSTRUCTIONS
You are seen in the emergency department after suffering a fall.  Your work-up was reassuring.  There is no evidence of major injuries.  You may take over the counter pain medications.    You were treated today for a laceration. Your physical exam is reassuring. Your laceration was closed with a skin glue. Please do not apply any oil-based ointment, as this will make the glue breakdown early. Please do not soak your hand in water, as this will also cause the glue to breakdown and early. Over time, the glue will begin to break down and will come off on its own. The glue is water resistant, so you can wash it with soap and water. Showering is OK.     Please come back to the ED if you develop fever, chills, severe progressive headache, vomiting, confusion, increased redness around the wound, drainage from wound or for any other concerns.       ================================  Coronavirus Information    Your visit did NOT relate to coronavirus, but if you or your family develop symptoms that concern you for coronavirus (please see CDC website for symptoms), please contact the [facility] hotline (or your local health department)  or your healthcare provider before going to a medical facility:    [facility]  24hr hotline: [phone]    Information is available from the Centers for Disease Control and Prevention  www.CDC.gov    and     [facility]  [email or web address]    If you are severely ill or having a hard time breathing, please immediatly seek medical care. Notify the  or Emergency Department Triage about your symptoms.

## 2020-06-05 NOTE — ED PROVIDER NOTES
"ED Provider Note      Means of Arrival: Private vehicle  History obtained from: Patient      CHIEF COMPLAINT  Chief Complaint   Patient presents with   • T-5000 FALL   • Facial Injury       HPI  Diana Tristan is a 87 y.o. female who presents after suffering a fall.  The patient reports that she was walking back from getting the mail when she lost her balance and fell down.  She denies any preceding chest pain or lightheadedness.  She denies any loss of conscious.  She did have a head strike.  She reports pain to her right shoulder, right wrist, both knees.    REVIEW OF SYSTEMS  CONSTITUTIONAL:  No fever.  CARDIOVASCULAR:  No chest discomfort.  RESPIRATORY:  No pleuritic chest pain.  GASTROINTESTINAL:  No abdominal pain.  GENITOURINARY:   No dysuria.  MUSCULOSKELETAL:   See HPI  SKIN:  No rash or suspicious lesions.  NEUROLOGIC:   No headache.  ENDOCRINE:  No facial edema.  HEMATOLOGIC:  No abnormal bleeding.       See HPI for further details.   All other systems are negative.     PAST MEDICAL HISTORY  Past Medical History:   Diagnosis Date   • UTI (urinary tract infection)        FAMILY HISTORY  History reviewed. No pertinent family history.    SOCIAL HISTORY   reports that she quit smoking about 5 years ago. She has never used smokeless tobacco. She reports that she does not drink alcohol or use drugs.    SURGICAL HISTORY  Past Surgical History:   Procedure Laterality Date   • US-CYST ASPIRATION-BREAST INITIAL         CURRENT MEDICATIONS  Home Medications    **Home medications have not yet been reviewed for this encounter**         ALLERGIES  Allergies   Allergen Reactions   • Augmentin [Amoxicillin-Pot Clavulanate] Hives   • Bactrim [Sulfamethoxazole W-Trimethoprim] Hives   • Diclofenac Swelling   • Codeine Vomiting and Nausea       PHYSICAL EXAM  VITAL SIGNS: /88   Pulse 71   Temp 36.7 °C (98 °F) (Temporal)   Resp 18   Ht 1.651 m (5' 5\")   Wt 45 kg (99 lb 3.3 oz)   SpO2 91%   BMI 16.51 kg/m²  "   Gen: Alert, oriented  HENT: No racoon eyes, hemotympanum, septal hematoma, facial instability.  Ecchymosis inferior right orbit, no orbital tenderness.  Laceration right temple, 2 cm  Eye: EOMI, no chemosis, PERRL  Neck: trachea midline, no tenderness  Resp: no respiratory distress, no chest wall tenderness or crepitus  CV: No JVD, RRR. no m/r/g, + peripheral pulses  Abd: soft, non-distended, non-tender. No ecchymosis  Back: no spinal tenderness or deformities  Ext: No deformities or edema.  No snuffbox tenderness.  Tenderness to palpation dorsal right carpal bones.  Distal CSM intact.  Abrasion right knee, contusion left knee.  Distal CSM is intact, no bony tenderness.  Psych: normal mood  Neuro: speech fluent, moves all extremities. GCS 15      RADIOLOGY/PROCEDURES  CT-HEAD W/O   Final Result         1.  No acute intracranial abnormality is identified, there are nonspecific white matter changes, commonly associated with small vessel ischemic disease.  Associated mild cerebral atrophy is noted.   2.  Atherosclerosis.      CT-MAXILLOFACIAL W/O PLUS RECONS   Final Result         1.  No acute traumatic facial bony injuries identified.   2.  Mild bilateral maxillary sinusitis changes   3.  Atherosclerosis      DX-WRIST-COMPLETE 3+ RIGHT   Final Result         1.  No acute traumatic bony injury.      DX-SHOULDER 2+ RIGHT   Final Result         1.  No acute traumatic bony injury.   2.  Right riding right humeral head with apparent subacromial impingement   3.  Atherosclerosis         Procedure laceration repair   Indication: Laceration.  The skin was irrigated and inspected, there is no evidence of foreign bodies.  Approximately 2 cm laceration was closed using skin glue with good approximation.  There were no immediate complications.  EBL: 0 mL      EKG  Results for orders placed or performed during the hospital encounter of 06/04/20   EKG (NOW)   Result Value Ref Range    Report       [facility]  [facility]    Test Date:  2020  Pt Name:    BRITTNEY THOMAS                  Department: EDSM  MRN:        [id number]                      Room:       [facility] BEDS  Gender:     Female                       Technician:   :        [id number]                   Requested By:ARNOLD AN  Order #:    [id number]                    Reading MD: Arnold An    Measurements  Intervals                                Axis  Rate:       68                           P:          92  ID:         [id number]                          QRS:        93  QRSD:       96                           T:          72  QT:         404  QTc:        430    Interpretive Statements  SINUS RHYTHM  LEFT ATRIAL ABNORMALITY  ANTERIOR INFARCT, AGE INDETERMINATE  Compared to ECG 2019 10:16:01  Atrial abnormality now present  Myocardial infarct finding now present  Right-axis deviation no longer present  Left ventricular hypertrophy no longer present  Q waves no longer present  Electronic ally Signed On 2020 23:22:49 PDT by Arnold An          COURSE & MEDICAL DECISION MAKING  Pertinent Labs & Imaging studies reviewed. (See chart for details)    Patient presents after losing her balance.  No obvious signs of stroke.  Low suspicion for seizure.  Due to her advanced age, evidence of head strike, will obtain CT scan of the head.  Will perform x-rays of the areas reporting complete complaints of pain.  She reports pain in her knees, however has no obvious abnormalities and has been ambulatory on them since the time of the accident.  Although she denies any preceding chest pain or lightheadedness, will perform screening EKG.  Patient has no murmurs for valvular presyncope.    EKG demonstrates no obvious ischemia, no preexcitation, Brugada syndrome, long QT, short QT, AVRT, or other dangerous conditions.  Patient's imaging demonstrates no subdural hematoma or fractures.  Her laceration was closed, patient be discharged home with return precautions  and anticipatory guidance.      Appropriate PPE were worn at this encounter.     FINAL IMPRESSION  1. Fall, initial encounter    2. Facial laceration, initial encounter    3. Abrasion of right knee, initial encounter

## 2020-07-03 ENCOUNTER — HOSPITAL ENCOUNTER (OUTPATIENT)
Dept: LAB | Facility: MEDICAL CENTER | Age: 85
End: 2020-07-03
Attending: FAMILY MEDICINE
Payer: MEDICARE

## 2020-07-03 LAB
ALBUMIN SERPL BCP-MCNC: 4.4 G/DL (ref 3.2–4.9)
ALBUMIN/GLOB SERPL: 1.8 G/DL
ALP SERPL-CCNC: 59 U/L (ref 30–99)
ALT SERPL-CCNC: 12 U/L (ref 2–50)
ANION GAP SERPL CALC-SCNC: 12 MMOL/L (ref 7–16)
APPEARANCE UR: ABNORMAL
AST SERPL-CCNC: 25 U/L (ref 12–45)
BACTERIA #/AREA URNS HPF: ABNORMAL /HPF
BASOPHILS # BLD AUTO: 0.4 % (ref 0–1.8)
BASOPHILS # BLD: 0.02 K/UL (ref 0–0.12)
BILIRUB SERPL-MCNC: 0.7 MG/DL (ref 0.1–1.5)
BILIRUB UR QL STRIP.AUTO: NEGATIVE
BUN SERPL-MCNC: 14 MG/DL (ref 8–22)
CALCIUM SERPL-MCNC: 9.7 MG/DL (ref 8.5–10.5)
CHLORIDE SERPL-SCNC: 97 MMOL/L (ref 96–112)
CHOLEST SERPL-MCNC: 168 MG/DL (ref 100–199)
CO2 SERPL-SCNC: 28 MMOL/L (ref 20–33)
COLOR UR: YELLOW
CREAT SERPL-MCNC: 0.66 MG/DL (ref 0.5–1.4)
EOSINOPHIL # BLD AUTO: 0.07 K/UL (ref 0–0.51)
EOSINOPHIL NFR BLD: 1.4 % (ref 0–6.9)
EPI CELLS #/AREA URNS HPF: ABNORMAL /HPF
ERYTHROCYTE [DISTWIDTH] IN BLOOD BY AUTOMATED COUNT: 53.7 FL (ref 35.9–50)
EST. AVERAGE GLUCOSE BLD GHB EST-MCNC: 131 MG/DL
GLOBULIN SER CALC-MCNC: 2.4 G/DL (ref 1.9–3.5)
GLUCOSE SERPL-MCNC: 84 MG/DL (ref 65–99)
GLUCOSE UR STRIP.AUTO-MCNC: NEGATIVE MG/DL
HBA1C MFR BLD: 6.2 % (ref 0–5.6)
HCT VFR BLD AUTO: 51.2 % (ref 37–47)
HDLC SERPL-MCNC: 66 MG/DL
HGB BLD-MCNC: 16.8 G/DL (ref 12–16)
IMM GRANULOCYTES # BLD AUTO: 0.01 K/UL (ref 0–0.11)
IMM GRANULOCYTES NFR BLD AUTO: 0.2 % (ref 0–0.9)
KETONES UR STRIP.AUTO-MCNC: ABNORMAL MG/DL
LDLC SERPL CALC-MCNC: 83 MG/DL
LEUKOCYTE ESTERASE UR QL STRIP.AUTO: ABNORMAL
LYMPHOCYTES # BLD AUTO: 1.86 K/UL (ref 1–4.8)
LYMPHOCYTES NFR BLD: 36.3 % (ref 22–41)
MCH RBC QN AUTO: 31.5 PG (ref 27–33)
MCHC RBC AUTO-ENTMCNC: 32.8 G/DL (ref 33.6–35)
MCV RBC AUTO: 95.9 FL (ref 81.4–97.8)
MICRO URNS: ABNORMAL
MONOCYTES # BLD AUTO: 0.56 K/UL (ref 0–0.85)
MONOCYTES NFR BLD AUTO: 10.9 % (ref 0–13.4)
MUCOUS THREADS #/AREA URNS HPF: ABNORMAL /HPF
NEUTROPHILS # BLD AUTO: 2.61 K/UL (ref 2–7.15)
NEUTROPHILS NFR BLD: 50.8 % (ref 44–72)
NITRITE UR QL STRIP.AUTO: POSITIVE
NRBC # BLD AUTO: 0 K/UL
NRBC BLD-RTO: 0 /100 WBC
PH UR STRIP.AUTO: 7 [PH] (ref 5–8)
PLATELET # BLD AUTO: 205 K/UL (ref 164–446)
PMV BLD AUTO: 10 FL (ref 9–12.9)
POTASSIUM SERPL-SCNC: 4.6 MMOL/L (ref 3.6–5.5)
PROT SERPL-MCNC: 6.8 G/DL (ref 6–8.2)
PROT UR QL STRIP: 300 MG/DL
RBC # BLD AUTO: 5.34 M/UL (ref 4.2–5.4)
RBC # URNS HPF: ABNORMAL /HPF
RBC UR QL AUTO: NEGATIVE
SODIUM SERPL-SCNC: 137 MMOL/L (ref 135–145)
SP GR UR STRIP.AUTO: 1.02
TRIGL SERPL-MCNC: 97 MG/DL (ref 0–149)
UROBILINOGEN UR STRIP.AUTO-MCNC: 0.2 MG/DL
WBC # BLD AUTO: 5.1 K/UL (ref 4.8–10.8)
WBC #/AREA URNS HPF: ABNORMAL /HPF

## 2020-07-03 PROCEDURE — 81001 URINALYSIS AUTO W/SCOPE: CPT

## 2020-07-03 PROCEDURE — 36415 COLL VENOUS BLD VENIPUNCTURE: CPT

## 2020-07-03 PROCEDURE — 85025 COMPLETE CBC W/AUTO DIFF WBC: CPT

## 2020-07-03 PROCEDURE — 83036 HEMOGLOBIN GLYCOSYLATED A1C: CPT | Mod: GA

## 2020-07-03 PROCEDURE — 80053 COMPREHEN METABOLIC PANEL: CPT

## 2020-07-03 PROCEDURE — 80061 LIPID PANEL: CPT

## 2021-01-14 DIAGNOSIS — Z23 NEED FOR VACCINATION: ICD-10-CM

## 2021-04-07 NOTE — PROGRESS NOTES
Subjective:     Diana Tristan is a 85 y.o. female who presents for Epistaxis (Since this morning nose bleeding .)  Patient presents to clinic today with a nosebleed that will not stop. Patient has been having one nosebleed a week for the past 3 weeks. Patient states when she blows her nose this morning the nose bleed began again. Patient states her nose has been dry in the past 3 weeks and she has been needing to blow her nose continuously. Patient does take 2 baby aspirins once a week. Patient denies any bleeding disorders. Patient denies any headaches, dizziness, blurred vision.     Epistaxis    The bleeding has been from the right nare. This is a new problem. The current episode started today. The problem occurs constantly. The problem has been unchanged. The bleeding is associated with aspirin. She has tried pressure for the symptoms. The treatment provided no relief. Her past medical history is significant for frequent nosebleeds (past 3 weeks). There is no history of a bleeding disorder or sinus problems.   No past medical history on file.  Past Surgical History:   Procedure Laterality Date   • US-CYST ASPIRATION-BREAST INITIAL       Social History     Social History   • Marital status:      Spouse name: N/A   • Number of children: N/A   • Years of education: N/A     Occupational History   • Not on file.     Social History Main Topics   • Smoking status: Former Smoker     Quit date: 1/26/2015   • Smokeless tobacco: Never Used   • Alcohol use Not on file   • Drug use: Unknown   • Sexual activity: Not on file     Other Topics Concern   • Not on file     Social History Narrative   • No narrative on file    No family history on file. Review of Systems   Constitutional: Negative for chills and fever.   HENT: Positive for nosebleeds. Negative for sore throat.    Eyes: Negative for pain.   Respiratory: Negative for shortness of breath.    Cardiovascular: Negative for chest pain.   Gastrointestinal: Negative  "for nausea and vomiting.   Genitourinary: Negative for hematuria.   Musculoskeletal: Negative for myalgias.   Skin: Negative for rash.   Neurological: Negative for dizziness.     Allergies   Allergen Reactions   • Augmentin [Amoxicillin-Pot Clavulanate] Hives   • Bactrim [Sulfamethoxazole W-Trimethoprim] Hives   • Codeine Vomiting   • Voltaren [Diclofenac] Swelling      Objective:   /80   Pulse 92   Temp 37.4 °C (99.3 °F)   Resp 18   Ht 1.651 m (5' 5\")   Wt 49.4 kg (109 lb)   SpO2 92%   BMI 18.14 kg/m²   Physical Exam   Constitutional: She is oriented to person, place, and time. She appears well-developed and well-nourished. No distress.   HENT:   Head: Normocephalic and atraumatic.   Right Ear: Tympanic membrane normal.   Left Ear: Tympanic membrane normal.   Nose: Epistaxis is observed. Right sinus exhibits no maxillary sinus tenderness and no frontal sinus tenderness. Left sinus exhibits no maxillary sinus tenderness and no frontal sinus tenderness.   Mouth/Throat: Uvula is midline, oropharynx is clear and moist and mucous membranes are normal. No posterior oropharyngeal edema, posterior oropharyngeal erythema or tonsillar abscesses. No tonsillar exudate.   Epistaxis right nostril   Eyes: Conjunctivae and EOM are normal. Pupils are equal, round, and reactive to light. Right eye exhibits no discharge. Left eye exhibits no discharge.   Cardiovascular: Normal rate and regular rhythm.    No murmur heard.  Pulmonary/Chest: Effort normal and breath sounds normal. No respiratory distress.   Abdominal: Soft. She exhibits no distension. There is no tenderness.   Neurological: She is alert and oriented to person, place, and time. She has normal reflexes. No sensory deficit.   Skin: Skin is warm, dry and intact.   Psychiatric: She has a normal mood and affect.         Assessment/Plan:   Assessment    1. Epistaxis   Applied Afrin to a Rhino Rocket and inserted into the right nostril. Unable to  stop bleeding " with Rhino Rocket, pressure, afrin at this time. Patient on aspirin 2x a week, no blood disorder per pmh. Advised patient that she'll need to go to  ER for cauterization of her nose as we are not equipped in Urgent care. Patient will travel personal vehicle to the ER.    Discussed patient’s case with attending physician, MD at [facility] regarding transfer of care to the emergency department as medically necessary for more definitive and emergent care.  Doctor advising to have patient go to [facility] as ENT is not at [facility]. Attempted to divert patient per MD recommendations however patient had already left parking lot.         36.7

## 2021-07-28 ENCOUNTER — HOSPITAL ENCOUNTER (OUTPATIENT)
Dept: LAB | Facility: MEDICAL CENTER | Age: 86
End: 2021-07-28
Attending: FAMILY MEDICINE
Payer: MEDICARE

## 2021-07-28 LAB
ALBUMIN SERPL BCP-MCNC: 4.3 G/DL (ref 3.2–4.9)
ALBUMIN/GLOB SERPL: 1.8 G/DL
ALP SERPL-CCNC: 69 U/L (ref 30–99)
ALT SERPL-CCNC: 14 U/L (ref 2–50)
ANION GAP SERPL CALC-SCNC: 9 MMOL/L (ref 7–16)
AST SERPL-CCNC: 33 U/L (ref 12–45)
BASOPHILS # BLD AUTO: 0.6 % (ref 0–1.8)
BASOPHILS # BLD: 0.03 K/UL (ref 0–0.12)
BILIRUB SERPL-MCNC: 0.7 MG/DL (ref 0.1–1.5)
BUN SERPL-MCNC: 15 MG/DL (ref 8–22)
CALCIUM SERPL-MCNC: 9.1 MG/DL (ref 8.5–10.5)
CHLORIDE SERPL-SCNC: 100 MMOL/L (ref 96–112)
CHOLEST SERPL-MCNC: 154 MG/DL (ref 100–199)
CO2 SERPL-SCNC: 28 MMOL/L (ref 20–33)
CREAT SERPL-MCNC: 0.69 MG/DL (ref 0.5–1.4)
EOSINOPHIL # BLD AUTO: 0.06 K/UL (ref 0–0.51)
EOSINOPHIL NFR BLD: 1.3 % (ref 0–6.9)
ERYTHROCYTE [DISTWIDTH] IN BLOOD BY AUTOMATED COUNT: 47 FL (ref 35.9–50)
EST. AVERAGE GLUCOSE BLD GHB EST-MCNC: 123 MG/DL
FASTING STATUS PATIENT QL REPORTED: NORMAL
FERRITIN SERPL-MCNC: 231 NG/ML (ref 10–291)
GLOBULIN SER CALC-MCNC: 2.4 G/DL (ref 1.9–3.5)
GLUCOSE SERPL-MCNC: 89 MG/DL (ref 65–99)
HBA1C MFR BLD: 5.9 % (ref 4–5.6)
HCT VFR BLD AUTO: 48.8 % (ref 37–47)
HDLC SERPL-MCNC: 64 MG/DL
HGB BLD-MCNC: 16.3 G/DL (ref 12–16)
IMM GRANULOCYTES # BLD AUTO: 0.01 K/UL (ref 0–0.11)
IMM GRANULOCYTES NFR BLD AUTO: 0.2 % (ref 0–0.9)
IRON SATN MFR SERPL: 41 % (ref 15–55)
IRON SERPL-MCNC: 129 UG/DL (ref 40–170)
LDLC SERPL CALC-MCNC: 75 MG/DL
LYMPHOCYTES # BLD AUTO: 1.79 K/UL (ref 1–4.8)
LYMPHOCYTES NFR BLD: 38.2 % (ref 22–41)
MCH RBC QN AUTO: 31.1 PG (ref 27–33)
MCHC RBC AUTO-ENTMCNC: 33.4 G/DL (ref 33.6–35)
MCV RBC AUTO: 93.1 FL (ref 81.4–97.8)
MONOCYTES # BLD AUTO: 0.54 K/UL (ref 0–0.85)
MONOCYTES NFR BLD AUTO: 11.5 % (ref 0–13.4)
NEUTROPHILS # BLD AUTO: 2.25 K/UL (ref 2–7.15)
NEUTROPHILS NFR BLD: 48.2 % (ref 44–72)
NRBC # BLD AUTO: 0 K/UL
NRBC BLD-RTO: 0 /100 WBC
PLATELET # BLD AUTO: 184 K/UL (ref 164–446)
PMV BLD AUTO: 10.8 FL (ref 9–12.9)
POTASSIUM SERPL-SCNC: 4.3 MMOL/L (ref 3.6–5.5)
PROT SERPL-MCNC: 6.7 G/DL (ref 6–8.2)
RBC # BLD AUTO: 5.24 M/UL (ref 4.2–5.4)
SODIUM SERPL-SCNC: 137 MMOL/L (ref 135–145)
T3FREE SERPL-MCNC: 2.54 PG/ML (ref 2–4.4)
T4 FREE SERPL-MCNC: 1.02 NG/DL (ref 0.93–1.7)
TIBC SERPL-MCNC: 315 UG/DL (ref 250–450)
TRIGL SERPL-MCNC: 76 MG/DL (ref 0–149)
TSH SERPL DL<=0.005 MIU/L-ACNC: 1.59 UIU/ML (ref 0.38–5.33)
UIBC SERPL-MCNC: 186 UG/DL (ref 110–370)
VIT B12 SERPL-MCNC: 477 PG/ML (ref 211–911)
WBC # BLD AUTO: 4.7 K/UL (ref 4.8–10.8)

## 2021-07-28 PROCEDURE — 83550 IRON BINDING TEST: CPT

## 2021-07-28 PROCEDURE — 83036 HEMOGLOBIN GLYCOSYLATED A1C: CPT | Mod: GA

## 2021-07-28 PROCEDURE — 83540 ASSAY OF IRON: CPT

## 2021-07-28 PROCEDURE — 84443 ASSAY THYROID STIM HORMONE: CPT

## 2021-07-28 PROCEDURE — 36415 COLL VENOUS BLD VENIPUNCTURE: CPT

## 2021-07-28 PROCEDURE — 80061 LIPID PANEL: CPT

## 2021-07-28 PROCEDURE — 84481 FREE ASSAY (FT-3): CPT

## 2021-07-28 PROCEDURE — 84439 ASSAY OF FREE THYROXINE: CPT

## 2021-07-28 PROCEDURE — 85025 COMPLETE CBC W/AUTO DIFF WBC: CPT

## 2021-07-28 PROCEDURE — 82728 ASSAY OF FERRITIN: CPT

## 2021-07-28 PROCEDURE — 82607 VITAMIN B-12: CPT

## 2021-07-28 PROCEDURE — 80053 COMPREHEN METABOLIC PANEL: CPT

## 2021-09-15 ENCOUNTER — HOSPITAL ENCOUNTER (OUTPATIENT)
Dept: RADIOLOGY | Facility: MEDICAL CENTER | Age: 86
End: 2021-09-15
Attending: FAMILY MEDICINE
Payer: MEDICARE

## 2021-09-15 DIAGNOSIS — Z78.0 POST-MENOPAUSE: ICD-10-CM

## 2021-09-15 PROCEDURE — 77080 DXA BONE DENSITY AXIAL: CPT

## 2021-09-22 ENCOUNTER — HOSPITAL ENCOUNTER (OUTPATIENT)
Dept: RADIOLOGY | Facility: MEDICAL CENTER | Age: 86
End: 2021-09-22
Attending: FAMILY MEDICINE
Payer: MEDICARE

## 2021-09-22 DIAGNOSIS — R26.81 UNSTEADY: ICD-10-CM

## 2021-09-22 PROCEDURE — 700117 HCHG RX CONTRAST REV CODE 255: Performed by: FAMILY MEDICINE

## 2021-09-22 PROCEDURE — A9576 INJ PROHANCE MULTIPACK: HCPCS | Performed by: FAMILY MEDICINE

## 2021-09-22 PROCEDURE — 70553 MRI BRAIN STEM W/O & W/DYE: CPT | Mod: MH

## 2021-09-22 RX ADMIN — GADOTERIDOL 10 ML: 279.3 INJECTION, SOLUTION INTRAVENOUS at 17:55

## 2021-12-28 ENCOUNTER — APPOINTMENT (OUTPATIENT)
Dept: RADIOLOGY | Facility: MEDICAL CENTER | Age: 86
DRG: 535 | End: 2021-12-28
Attending: EMERGENCY MEDICINE
Payer: MEDICARE

## 2021-12-28 ENCOUNTER — HOSPITAL ENCOUNTER (INPATIENT)
Facility: MEDICAL CENTER | Age: 86
LOS: 3 days | DRG: 535 | End: 2022-01-01
Attending: EMERGENCY MEDICINE | Admitting: STUDENT IN AN ORGANIZED HEALTH CARE EDUCATION/TRAINING PROGRAM
Payer: MEDICARE

## 2021-12-28 DIAGNOSIS — J43.1 PANLOBULAR EMPHYSEMA (HCC): ICD-10-CM

## 2021-12-28 DIAGNOSIS — S32.82XA MULTIPLE CLOSED FRACTURES OF PELVIS WITHOUT DISRUPTION OF PELVIC RING, INITIAL ENCOUNTER (HCC): ICD-10-CM

## 2021-12-28 DIAGNOSIS — R07.2 PRECORDIAL PAIN: ICD-10-CM

## 2021-12-28 DIAGNOSIS — R09.02 HYPOXIA: ICD-10-CM

## 2021-12-28 DIAGNOSIS — J18.9 PNEUMONIA OF LEFT UPPER LOBE DUE TO INFECTIOUS ORGANISM: ICD-10-CM

## 2021-12-28 DIAGNOSIS — E86.0 DEHYDRATION: ICD-10-CM

## 2021-12-28 DIAGNOSIS — E43 SEVERE PROTEIN-CALORIE MALNUTRITION (HCC): ICD-10-CM

## 2021-12-28 DIAGNOSIS — R93.89 CHEST X-RAY ABNORMALITY: ICD-10-CM

## 2021-12-28 DIAGNOSIS — E78.5 HYPERLIPIDEMIA, UNSPECIFIED HYPERLIPIDEMIA TYPE: ICD-10-CM

## 2021-12-28 DIAGNOSIS — S32.591A CLOSED FRACTURE OF RIGHT INFERIOR PUBIC RAMUS, INITIAL ENCOUNTER (HCC): ICD-10-CM

## 2021-12-28 DIAGNOSIS — I16.0 HYPERTENSIVE URGENCY: ICD-10-CM

## 2021-12-28 DIAGNOSIS — S32.010A CLOSED COMPRESSION FRACTURE OF BODY OF L1 VERTEBRA (HCC): ICD-10-CM

## 2021-12-28 DIAGNOSIS — R33.9 URINARY RETENTION: ICD-10-CM

## 2021-12-28 DIAGNOSIS — J96.01 ACUTE RESPIRATORY FAILURE WITH HYPOXIA (HCC): ICD-10-CM

## 2021-12-28 DIAGNOSIS — S32.010A COMPRESSION FRACTURE OF L1 VERTEBRA, INITIAL ENCOUNTER (HCC): ICD-10-CM

## 2021-12-28 DIAGNOSIS — S32.691A: ICD-10-CM

## 2021-12-28 DIAGNOSIS — Z71.89 ADVANCE CARE PLANNING: ICD-10-CM

## 2021-12-28 PROCEDURE — 99285 EMERGENCY DEPT VISIT HI MDM: CPT

## 2021-12-28 PROCEDURE — 85025 COMPLETE CBC W/AUTO DIFF WBC: CPT

## 2021-12-28 PROCEDURE — 80053 COMPREHEN METABOLIC PANEL: CPT

## 2021-12-28 PROCEDURE — 71045 X-RAY EXAM CHEST 1 VIEW: CPT

## 2021-12-28 ASSESSMENT — FIBROSIS 4 INDEX: FIB4 SCORE: 4.22

## 2021-12-29 ENCOUNTER — APPOINTMENT (OUTPATIENT)
Dept: RADIOLOGY | Facility: MEDICAL CENTER | Age: 86
DRG: 535 | End: 2021-12-29
Attending: ORTHOPAEDIC SURGERY
Payer: MEDICARE

## 2021-12-29 PROBLEM — I16.0 HYPERTENSIVE URGENCY: Status: ACTIVE | Noted: 2019-08-14

## 2021-12-29 PROBLEM — R33.9 URINARY RETENTION: Status: ACTIVE | Noted: 2021-12-29

## 2021-12-29 PROBLEM — S32.010A CLOSED COMPRESSION FRACTURE OF BODY OF L1 VERTEBRA (HCC): Status: ACTIVE | Noted: 2021-12-29

## 2021-12-29 PROBLEM — J96.01 ACUTE RESPIRATORY FAILURE WITH HYPOXIA (HCC): Status: ACTIVE | Noted: 2021-12-29

## 2021-12-29 PROBLEM — J18.9 PNEUMONIA OF LEFT UPPER LOBE DUE TO INFECTIOUS ORGANISM: Status: ACTIVE | Noted: 2021-12-29

## 2021-12-29 PROBLEM — Z71.89 ADVANCE CARE PLANNING: Status: ACTIVE | Noted: 2021-12-29

## 2021-12-29 PROBLEM — S32.82XA MULTIPLE FRACTURES OF PELVIS (HCC): Status: ACTIVE | Noted: 2021-12-29

## 2021-12-29 PROBLEM — E43 SEVERE PROTEIN-CALORIE MALNUTRITION (HCC): Status: ACTIVE | Noted: 2021-12-29

## 2021-12-29 LAB
ALBUMIN SERPL BCP-MCNC: 4.2 G/DL (ref 3.2–4.9)
ALBUMIN/GLOB SERPL: 1.8 G/DL
ALP SERPL-CCNC: 74 U/L (ref 30–99)
ALT SERPL-CCNC: 19 U/L (ref 2–50)
ANION GAP SERPL CALC-SCNC: 9 MMOL/L (ref 7–16)
AST SERPL-CCNC: 30 U/L (ref 12–45)
BASOPHILS # BLD AUTO: 0.1 % (ref 0–1.8)
BASOPHILS # BLD AUTO: 0.2 % (ref 0–1.8)
BASOPHILS # BLD: 0.01 K/UL (ref 0–0.12)
BASOPHILS # BLD: 0.02 K/UL (ref 0–0.12)
BILIRUB SERPL-MCNC: 0.5 MG/DL (ref 0.1–1.5)
BUN SERPL-MCNC: 17 MG/DL (ref 8–22)
CALCIUM SERPL-MCNC: 9.8 MG/DL (ref 8.4–10.2)
CHLORIDE SERPL-SCNC: 98 MMOL/L (ref 96–112)
CO2 SERPL-SCNC: 28 MMOL/L (ref 20–33)
CREAT SERPL-MCNC: 0.73 MG/DL (ref 0.5–1.4)
EOSINOPHIL # BLD AUTO: 0.05 K/UL (ref 0–0.51)
EOSINOPHIL # BLD AUTO: 0.05 K/UL (ref 0–0.51)
EOSINOPHIL NFR BLD: 0.6 % (ref 0–6.9)
EOSINOPHIL NFR BLD: 0.6 % (ref 0–6.9)
ERYTHROCYTE [DISTWIDTH] IN BLOOD BY AUTOMATED COUNT: 45.3 FL (ref 35.9–50)
ERYTHROCYTE [DISTWIDTH] IN BLOOD BY AUTOMATED COUNT: 45.6 FL (ref 35.9–50)
FLUAV RNA SPEC QL NAA+PROBE: NEGATIVE
FLUBV RNA SPEC QL NAA+PROBE: NEGATIVE
GLOBULIN SER CALC-MCNC: 2.3 G/DL (ref 1.9–3.5)
GLUCOSE SERPL-MCNC: 126 MG/DL (ref 65–99)
HCT VFR BLD AUTO: 47.8 % (ref 37–47)
HCT VFR BLD AUTO: 47.8 % (ref 37–47)
HGB BLD-MCNC: 15.9 G/DL (ref 12–16)
HGB BLD-MCNC: 16.1 G/DL (ref 12–16)
IMM GRANULOCYTES # BLD AUTO: 0.03 K/UL (ref 0–0.11)
IMM GRANULOCYTES # BLD AUTO: 0.11 K/UL (ref 0–0.11)
IMM GRANULOCYTES NFR BLD AUTO: 0.4 % (ref 0–0.9)
IMM GRANULOCYTES NFR BLD AUTO: 1.3 % (ref 0–0.9)
LACTATE BLD-SCNC: 1 MMOL/L (ref 0.5–2)
LYMPHOCYTES # BLD AUTO: 0.92 K/UL (ref 1–4.8)
LYMPHOCYTES # BLD AUTO: 1.44 K/UL (ref 1–4.8)
LYMPHOCYTES NFR BLD: 11.2 % (ref 22–41)
LYMPHOCYTES NFR BLD: 18.4 % (ref 22–41)
MAGNESIUM SERPL-MCNC: 2 MG/DL (ref 1.5–2.5)
MCH RBC QN AUTO: 31.1 PG (ref 27–33)
MCH RBC QN AUTO: 31.1 PG (ref 27–33)
MCHC RBC AUTO-ENTMCNC: 33.3 G/DL (ref 33.6–35)
MCHC RBC AUTO-ENTMCNC: 33.7 G/DL (ref 33.6–35)
MCV RBC AUTO: 92.5 FL (ref 81.4–97.8)
MCV RBC AUTO: 93.4 FL (ref 81.4–97.8)
MONOCYTES # BLD AUTO: 0.71 K/UL (ref 0–0.85)
MONOCYTES # BLD AUTO: 0.89 K/UL (ref 0–0.85)
MONOCYTES NFR BLD AUTO: 11.4 % (ref 0–13.4)
MONOCYTES NFR BLD AUTO: 8.6 % (ref 0–13.4)
NEUTROPHILS # BLD AUTO: 5.39 K/UL (ref 2–7.15)
NEUTROPHILS # BLD AUTO: 6.44 K/UL (ref 2–7.15)
NEUTROPHILS NFR BLD: 69.1 % (ref 44–72)
NEUTROPHILS NFR BLD: 78.1 % (ref 44–72)
NRBC # BLD AUTO: 0 K/UL
NRBC # BLD AUTO: 0 K/UL
NRBC BLD-RTO: 0 /100 WBC
NRBC BLD-RTO: 0 /100 WBC
PHOSPHATE SERPL-MCNC: 3.7 MG/DL (ref 2.5–4.5)
PLATELET # BLD AUTO: 146 K/UL (ref 164–446)
PLATELET # BLD AUTO: 164 K/UL (ref 164–446)
PMV BLD AUTO: 10.3 FL (ref 9–12.9)
PMV BLD AUTO: 9.5 FL (ref 9–12.9)
POTASSIUM SERPL-SCNC: 3.7 MMOL/L (ref 3.6–5.5)
PROCALCITONIN SERPL-MCNC: 0.05 NG/ML
PROT SERPL-MCNC: 6.5 G/DL (ref 6–8.2)
RBC # BLD AUTO: 5.12 M/UL (ref 4.2–5.4)
RBC # BLD AUTO: 5.17 M/UL (ref 4.2–5.4)
RSV RNA SPEC QL NAA+PROBE: NEGATIVE
SARS-COV-2 RNA RESP QL NAA+PROBE: NOTDETECTED
SODIUM SERPL-SCNC: 135 MMOL/L (ref 135–145)
SPECIMEN SOURCE: NORMAL
WBC # BLD AUTO: 7.8 K/UL (ref 4.8–10.8)
WBC # BLD AUTO: 8.3 K/UL (ref 4.8–10.8)

## 2021-12-29 PROCEDURE — 94760 N-INVAS EAR/PLS OXIMETRY 1: CPT

## 2021-12-29 PROCEDURE — 96375 TX/PRO/DX INJ NEW DRUG ADDON: CPT

## 2021-12-29 PROCEDURE — 84100 ASSAY OF PHOSPHORUS: CPT

## 2021-12-29 PROCEDURE — 83735 ASSAY OF MAGNESIUM: CPT

## 2021-12-29 PROCEDURE — 85025 COMPLETE CBC W/AUTO DIFF WBC: CPT

## 2021-12-29 PROCEDURE — 770006 HCHG ROOM/CARE - MED/SURG/GYN SEMI*

## 2021-12-29 PROCEDURE — 700111 HCHG RX REV CODE 636 W/ 250 OVERRIDE (IP): Performed by: EMERGENCY MEDICINE

## 2021-12-29 PROCEDURE — 94669 MECHANICAL CHEST WALL OSCILL: CPT

## 2021-12-29 PROCEDURE — 72192 CT PELVIS W/O DYE: CPT | Mod: MG

## 2021-12-29 PROCEDURE — A9270 NON-COVERED ITEM OR SERVICE: HCPCS | Performed by: STUDENT IN AN ORGANIZED HEALTH CARE EDUCATION/TRAINING PROGRAM

## 2021-12-29 PROCEDURE — 84145 PROCALCITONIN (PCT): CPT

## 2021-12-29 PROCEDURE — 87040 BLOOD CULTURE FOR BACTERIA: CPT | Mod: 91

## 2021-12-29 PROCEDURE — A9270 NON-COVERED ITEM OR SERVICE: HCPCS | Performed by: INTERNAL MEDICINE

## 2021-12-29 PROCEDURE — 0241U HCHG SARS-COV-2 COVID-19 NFCT DS RESP RNA 4 TRGT MIC: CPT

## 2021-12-29 PROCEDURE — 96365 THER/PROPH/DIAG IV INF INIT: CPT

## 2021-12-29 PROCEDURE — 72131 CT LUMBAR SPINE W/O DYE: CPT | Mod: ME

## 2021-12-29 PROCEDURE — 700102 HCHG RX REV CODE 250 W/ 637 OVERRIDE(OP): Performed by: STUDENT IN AN ORGANIZED HEALTH CARE EDUCATION/TRAINING PROGRAM

## 2021-12-29 PROCEDURE — 99223 1ST HOSP IP/OBS HIGH 75: CPT | Mod: AI | Performed by: STUDENT IN AN ORGANIZED HEALTH CARE EDUCATION/TRAINING PROGRAM

## 2021-12-29 PROCEDURE — 96376 TX/PRO/DX INJ SAME DRUG ADON: CPT

## 2021-12-29 PROCEDURE — 700111 HCHG RX REV CODE 636 W/ 250 OVERRIDE (IP): Performed by: STUDENT IN AN ORGANIZED HEALTH CARE EDUCATION/TRAINING PROGRAM

## 2021-12-29 PROCEDURE — 83605 ASSAY OF LACTIC ACID: CPT

## 2021-12-29 PROCEDURE — 72190 X-RAY EXAM OF PELVIS: CPT

## 2021-12-29 PROCEDURE — 36415 COLL VENOUS BLD VENIPUNCTURE: CPT

## 2021-12-29 PROCEDURE — 700102 HCHG RX REV CODE 250 W/ 637 OVERRIDE(OP): Performed by: INTERNAL MEDICINE

## 2021-12-29 PROCEDURE — 99497 ADVNCD CARE PLAN 30 MIN: CPT | Performed by: INTERNAL MEDICINE

## 2021-12-29 RX ORDER — IPRATROPIUM BROMIDE AND ALBUTEROL SULFATE 2.5; .5 MG/3ML; MG/3ML
3 SOLUTION RESPIRATORY (INHALATION)
Status: DISCONTINUED | OUTPATIENT
Start: 2021-12-29 | End: 2022-01-01 | Stop reason: HOSPADM

## 2021-12-29 RX ORDER — ACETAMINOPHEN 325 MG/1
650 TABLET ORAL EVERY 6 HOURS PRN
Status: DISCONTINUED | OUTPATIENT
Start: 2021-12-29 | End: 2022-01-01 | Stop reason: HOSPADM

## 2021-12-29 RX ORDER — LOVASTATIN 20 MG/1
10 TABLET ORAL NIGHTLY
Status: DISCONTINUED | OUTPATIENT
Start: 2021-12-29 | End: 2022-01-01 | Stop reason: HOSPADM

## 2021-12-29 RX ORDER — RISEDRONATE SODIUM 35 MG/1
35 TABLET, FILM COATED ORAL
Status: ON HOLD | COMMUNITY
Start: 2021-11-02 | End: 2024-01-17

## 2021-12-29 RX ORDER — AZITHROMYCIN 500 MG/1
500 INJECTION, POWDER, LYOPHILIZED, FOR SOLUTION INTRAVENOUS ONCE
Status: COMPLETED | OUTPATIENT
Start: 2021-12-29 | End: 2021-12-29

## 2021-12-29 RX ORDER — POLYETHYLENE GLYCOL 3350 17 G/17G
1 POWDER, FOR SOLUTION ORAL
Status: DISCONTINUED | OUTPATIENT
Start: 2021-12-29 | End: 2022-01-01 | Stop reason: HOSPADM

## 2021-12-29 RX ORDER — AZITHROMYCIN 250 MG/1
500 TABLET, FILM COATED ORAL DAILY
Status: DISCONTINUED | OUTPATIENT
Start: 2021-12-30 | End: 2021-12-31

## 2021-12-29 RX ORDER — CEFTRIAXONE 2 G/1
2 INJECTION, POWDER, FOR SOLUTION INTRAMUSCULAR; INTRAVENOUS ONCE
Status: COMPLETED | OUTPATIENT
Start: 2021-12-29 | End: 2021-12-29

## 2021-12-29 RX ORDER — ONDANSETRON 2 MG/ML
4 INJECTION INTRAMUSCULAR; INTRAVENOUS ONCE
Status: COMPLETED | OUTPATIENT
Start: 2021-12-29 | End: 2021-12-29

## 2021-12-29 RX ORDER — MORPHINE SULFATE 4 MG/ML
2 INJECTION INTRAVENOUS ONCE
Status: COMPLETED | OUTPATIENT
Start: 2021-12-29 | End: 2021-12-29

## 2021-12-29 RX ORDER — LATANOPROST 50 UG/ML
1 SOLUTION/ DROPS OPHTHALMIC
COMMUNITY
Start: 2021-12-18

## 2021-12-29 RX ORDER — BENAZEPRIL HYDROCHLORIDE 10 MG/1
40 TABLET ORAL DAILY
Status: DISCONTINUED | OUTPATIENT
Start: 2021-12-29 | End: 2022-01-01 | Stop reason: HOSPADM

## 2021-12-29 RX ORDER — BISACODYL 10 MG
10 SUPPOSITORY, RECTAL RECTAL
Status: DISCONTINUED | OUTPATIENT
Start: 2021-12-29 | End: 2022-01-01 | Stop reason: HOSPADM

## 2021-12-29 RX ORDER — HYDRALAZINE HYDROCHLORIDE 20 MG/ML
10 INJECTION INTRAMUSCULAR; INTRAVENOUS EVERY 6 HOURS PRN
Status: DISCONTINUED | OUTPATIENT
Start: 2021-12-29 | End: 2022-01-01 | Stop reason: HOSPADM

## 2021-12-29 RX ORDER — HYDROCODONE BITARTRATE AND ACETAMINOPHEN 5; 325 MG/1; MG/1
1-2 TABLET ORAL EVERY 6 HOURS PRN
Status: DISCONTINUED | OUTPATIENT
Start: 2021-12-29 | End: 2022-01-01 | Stop reason: HOSPADM

## 2021-12-29 RX ORDER — ONDANSETRON 2 MG/ML
4 INJECTION INTRAMUSCULAR; INTRAVENOUS EVERY 4 HOURS PRN
Status: DISCONTINUED | OUTPATIENT
Start: 2021-12-29 | End: 2022-01-01 | Stop reason: HOSPADM

## 2021-12-29 RX ORDER — LABETALOL HYDROCHLORIDE 5 MG/ML
20 INJECTION, SOLUTION INTRAVENOUS EVERY 4 HOURS PRN
Status: DISCONTINUED | OUTPATIENT
Start: 2021-12-29 | End: 2022-01-01 | Stop reason: HOSPADM

## 2021-12-29 RX ORDER — AMOXICILLIN 250 MG
2 CAPSULE ORAL 2 TIMES DAILY
Status: DISCONTINUED | OUTPATIENT
Start: 2021-12-29 | End: 2022-01-01 | Stop reason: HOSPADM

## 2021-12-29 RX ORDER — HYDRALAZINE HYDROCHLORIDE 10 MG/1
10 TABLET, FILM COATED ORAL EVERY 8 HOURS
Status: DISCONTINUED | OUTPATIENT
Start: 2021-12-29 | End: 2021-12-29

## 2021-12-29 RX ORDER — AMLODIPINE BESYLATE 5 MG/1
10 TABLET ORAL
Status: DISCONTINUED | OUTPATIENT
Start: 2021-12-29 | End: 2022-01-01 | Stop reason: HOSPADM

## 2021-12-29 RX ORDER — MORPHINE SULFATE 4 MG/ML
4 INJECTION INTRAVENOUS EVERY 4 HOURS PRN
Status: DISCONTINUED | OUTPATIENT
Start: 2021-12-29 | End: 2022-01-01 | Stop reason: HOSPADM

## 2021-12-29 RX ORDER — ONDANSETRON 4 MG/1
4 TABLET, ORALLY DISINTEGRATING ORAL EVERY 4 HOURS PRN
Status: DISCONTINUED | OUTPATIENT
Start: 2021-12-29 | End: 2022-01-01 | Stop reason: HOSPADM

## 2021-12-29 RX ADMIN — MORPHINE SULFATE 2 MG: 4 INJECTION INTRAVENOUS at 00:10

## 2021-12-29 RX ADMIN — BENAZEPRIL HYDROCHLORIDE 40 MG: 10 TABLET ORAL at 02:26

## 2021-12-29 RX ADMIN — ONDANSETRON 4 MG: 2 INJECTION INTRAMUSCULAR; INTRAVENOUS at 05:53

## 2021-12-29 RX ADMIN — AZITHROMYCIN MONOHYDRATE 500 MG: 500 INJECTION, POWDER, LYOPHILIZED, FOR SOLUTION INTRAVENOUS at 00:57

## 2021-12-29 RX ADMIN — LOVASTATIN 10 MG: 20 TABLET ORAL at 21:17

## 2021-12-29 RX ADMIN — CEFTRIAXONE SODIUM 2 G: 2 INJECTION, POWDER, FOR SOLUTION INTRAMUSCULAR; INTRAVENOUS at 00:56

## 2021-12-29 RX ADMIN — MORPHINE SULFATE 4 MG: 4 INJECTION INTRAVENOUS at 05:54

## 2021-12-29 RX ADMIN — AMLODIPINE BESYLATE 10 MG: 5 TABLET ORAL at 14:39

## 2021-12-29 RX ADMIN — ONDANSETRON 4 MG: 2 INJECTION INTRAMUSCULAR; INTRAVENOUS at 00:10

## 2021-12-29 ASSESSMENT — ENCOUNTER SYMPTOMS
BACK PAIN: 1
NAUSEA: 0
PALPITATIONS: 0
SORE THROAT: 0
SHORTNESS OF BREATH: 0
HEADACHES: 0
HALLUCINATIONS: 0
FOCAL WEAKNESS: 0
FALLS: 1
COUGH: 0
DIARRHEA: 0
ABDOMINAL PAIN: 0
HEARTBURN: 0
WEAKNESS: 0
BLOOD IN STOOL: 0
VOMITING: 0
CHILLS: 0
DEPRESSION: 0
DIZZINESS: 0
FEVER: 0
MYALGIAS: 1

## 2021-12-29 ASSESSMENT — COGNITIVE AND FUNCTIONAL STATUS - GENERAL
WALKING IN HOSPITAL ROOM: A LOT
STANDING UP FROM CHAIR USING ARMS: A LOT
PERSONAL GROOMING: A LITTLE
HELP NEEDED FOR BATHING: A LOT
MOVING FROM LYING ON BACK TO SITTING ON SIDE OF FLAT BED: A LOT
TURNING FROM BACK TO SIDE WHILE IN FLAT BAD: A LOT
DRESSING REGULAR UPPER BODY CLOTHING: A LITTLE
CLIMB 3 TO 5 STEPS WITH RAILING: A LOT
DRESSING REGULAR LOWER BODY CLOTHING: A LOT
MOBILITY SCORE: 12
SUGGESTED CMS G CODE MODIFIER DAILY ACTIVITY: CK
TOILETING: A LOT
SUGGESTED CMS G CODE MODIFIER MOBILITY: CL
EATING MEALS: A LITTLE
DAILY ACTIVITIY SCORE: 15
MOVING TO AND FROM BED TO CHAIR: A LOT

## 2021-12-29 ASSESSMENT — COPD QUESTIONNAIRES
COPD SCREENING SCORE: 4
DO YOU EVER COUGH UP ANY MUCUS OR PHLEGM?: NO/ONLY WITH OCCASIONAL COLDS OR INFECTIONS
DURING THE PAST 4 WEEKS HOW MUCH DID YOU FEEL SHORT OF BREATH: NONE/LITTLE OF THE TIME
HAVE YOU SMOKED AT LEAST 100 CIGARETTES IN YOUR ENTIRE LIFE: YES

## 2021-12-29 ASSESSMENT — LIFESTYLE VARIABLES
HOW MANY TIMES IN THE PAST YEAR HAVE YOU HAD 5 OR MORE DRINKS IN A DAY: 0
EVER HAD A DRINK FIRST THING IN THE MORNING TO STEADY YOUR NERVES TO GET RID OF A HANGOVER: NO
ON A TYPICAL DAY WHEN YOU DRINK ALCOHOL HOW MANY DRINKS DO YOU HAVE: 0
AVERAGE NUMBER OF DAYS PER WEEK YOU HAVE A DRINK CONTAINING ALCOHOL: 0
TOTAL SCORE: 0
TOTAL SCORE: 0
EVER FELT BAD OR GUILTY ABOUT YOUR DRINKING: NO
ALCOHOL_USE: NO
HAVE PEOPLE ANNOYED YOU BY CRITICIZING YOUR DRINKING: NO
TOTAL SCORE: 0
CONSUMPTION TOTAL: NEGATIVE
HAVE YOU EVER FELT YOU SHOULD CUT DOWN ON YOUR DRINKING: NO

## 2021-12-29 ASSESSMENT — PAIN DESCRIPTION - PAIN TYPE
TYPE: ACUTE PAIN
TYPE: ACUTE PAIN

## 2021-12-29 ASSESSMENT — FIBROSIS 4 INDEX: FIB4 SCORE: 4.15

## 2021-12-29 NOTE — ED NOTES
Pt slightly hypoxic per EMS at 84%, currently 87% on RA. Pt denies cough, fever, SOB or feeling ill. Pt is current on covid vaccines and booster.

## 2021-12-29 NOTE — PROGRESS NOTES
CT reviewed  Right inferior pubic ramus fx  Check baseline xrays  Repeat CBC in AM  Nonop treatment  WBAT BLE

## 2021-12-29 NOTE — PROGRESS NOTES
Hospital Medicine Daily Progress Note    Date of Service  12/29/2021    Chief Complaint  Diana Tristan is a 88 y.o. female admitted 12/28/2021 with fall    Hospital Course  History of hypertension, COPD which is nonoxygen dependent admitted after ground-level fall and inability to ambulate.  When [facility] arrived at her house they found her to be hypoxic.  On admission she was found to have hypertensive urgency, acute respiratory failure with hypoxia requiring 2 L, chest x-ray consistent with community-acquired pneumonia.  CT imaging revealed L1 compression fracture as well as right pubic ramus fracture.    Interval Problem Update  12/29: Urine retention 650 straight cath x1.  Unable to ambulate, requiring [place] for pain control.  Requiring 2 L, tolerating antibiotics.  BP uncontrolled in the 190s to 200s, amlodipine added    I have personally seen and examined the patient at bedside. I discussed the plan of care with patient and bedside RN.    Consultants/Specialty  orthopedics Dr. Bae    Code Status  DNAR, I OK    Disposition  Patient is not medically cleared for discharge.   Anticipate discharge to to skilled nursing facility.  I have placed the appropriate orders for post-discharge needs.    Review of Systems  Review of Systems   Constitutional: Negative for chills, fever and malaise/fatigue.   HENT: Negative for sore throat.    Respiratory: Negative for cough and shortness of breath.    Cardiovascular: Negative for chest pain and palpitations.   Gastrointestinal: Negative for abdominal pain, blood in stool, diarrhea, heartburn, nausea and vomiting.   Genitourinary: Negative for dysuria and frequency.   Musculoskeletal: Positive for back pain, falls and myalgias.   Neurological: Negative for dizziness, focal weakness, weakness and headaches.   Psychiatric/Behavioral: Negative for depression and hallucinations.   All other systems reviewed and are negative.       Physical Exam  Temp:  [36.4 °C (97.5  °F)-36.7 °C (98 °F)] 36.7 °C (98 °F)  Pulse:  [62-82] 75  Resp:  [16-18] 18  BP: (150-204)/(59-89) 196/82  SpO2:  [87 %-100 %] 98 %    Physical Exam  Constitutional:       General: She is not in acute distress.     Appearance: She is cachectic.   HENT:      Nose: Nose normal.      Mouth/Throat:      Mouth: Mucous membranes are dry.   Cardiovascular:      Rate and Rhythm: Normal rate and regular rhythm.      Pulses: Normal pulses.   Pulmonary:      Effort: Pulmonary effort is normal. No respiratory distress.      Breath sounds: Normal breath sounds. No wheezing.   Abdominal:      General: There is no distension.      Palpations: Abdomen is soft.   Musculoskeletal:         General: Signs of injury present. No swelling.      Cervical back: No muscular tenderness.      Comments: Decreased ROM bilateral lower extremities   Lymphadenopathy:      Cervical: No cervical adenopathy.   Skin:     General: Skin is warm and dry.      Findings: No rash.   Neurological:      General: No focal deficit present.      Mental Status: She is alert and oriented to person, place, and time.      Motor: Weakness present.   Psychiatric:         Mood and Affect: Mood normal.         Thought Content: Thought content normal.         Fluids  No intake or output data in the 24 hours ending 12/29/21 1452    Laboratory  Recent Labs     12/28/21  2335 12/29/21  0534   WBC 8.3 7.8   RBC 5.17 5.12   HEMOGLOBIN 16.1* 15.9   HEMATOCRIT 47.8* 47.8*   MCV 92.5 93.4   MCH 31.1 31.1   MCHC 33.7 33.3*   RDW 45.6 45.3   PLATELETCT 164 146*   MPV 10.3 9.5     Recent Labs     12/28/21  2335   SODIUM 135   POTASSIUM 3.7   CHLORIDE 98   CO2 28   GLUCOSE 126*   BUN 17   CREATININE 0.73   CALCIUM 9.8                   Imaging  CT-LSPINE W/O PLUS RECONS   Final Result      1.  Acute compression deformity of L1 extending into the posterior cortex. Trace retropulsion of the superior aspect.   2.  Multilevel degenerative changes.   3.  Partially visualized bibasilar  fibrotic changes.   4.  Small hiatal hernia.      CT-PELVIS W/O PLUS RECONS   Final Result         1.  Comminuted fracture of the right ischial tuberosity.   2.  Mildly comminuted and displaced right inferior pubic ramus fracture. Small adjacent hematoma.   3.  Evaluation for additional periprosthetic fracture is suboptimal due to streak artifact and osteopenia.   4.  Atherosclerotic changes.      DX-CHEST-PORTABLE (1 VIEW)   Final Result      1.  Left perihilar airspace opacity concerning for pneumonia.      DX-PELVIS-TRAUMA SERIES  3-    (Results Pending)        Assessment/Plan  * Acute respiratory failure with hypoxia (HCC)- (present on admission)  Assessment & Plan  Secondary to pneumonia.  Oxygen per guidelines, wean as able.  Incentive spirometry, pulmonary hygiene  DuoNebs as needed.      Advance care planning  Assessment & Plan  Patient confirmed that she would not want chest compressions.  She does not want any therapies with held up until this point.  She would be okay with intubation.  I discussed advance care planning with the patient for at least 35 minutes, including diagnosis, prognosis, plan of care, risks and benefits of any therapies that could be offered, as well as alternatives including palliation and hospice, as appropriate.      Urinary retention  Assessment & Plan  Likely secondary to immobility, narcotics  Straight cath if greater than 350  Bladder scan every 8    Severe protein-calorie malnutrition (HCC)  Assessment & Plan  Dietary consult, nutritional supplements    Closed compression fracture of body of L1 vertebra (HCC)- (present on admission)  Assessment & Plan  Mild low back pain.  PT OT.  We will see how patient does with PT OT, may need TLSO brace   pain control    Multiple fractures of pelvis (HCC)- (present on admission)  Assessment & Plan  Comminuted fracture of right ischial tuberosity as well as mild comminuted displaced right inferior pubic ramus fracture.  Pain control  I  discussed the case with orthopedic surgery, Dr. Bae  Non-operative management  WBAT  Fall precautions.  PT OT to evaluate  As needed morphine, as needed Norco    Pneumonia of left upper lobe due to infectious organism- (present on admission)  Assessment & Plan  Continue ceftriaxone and azithromycin  Follow-up blood cultures.  Procalcitonin negative  Incentive spirometry.      Hypertensive urgency- (present on admission)  Assessment & Plan  Blood pressure not at goal, Consistently in the 170s to 200s  Partially secondary to pain  Continue benazepril 40  Add amlodipine 10 mg daily  IV antihypertensives, hydralazine with parameters    COPD (chronic obstructive pulmonary disease) (HCC)- (present on admission)  Assessment & Plan  Not in acute exacerbation.  Breathing treatments as needed    Hyperlipidemia- (present on admission)  Assessment & Plan  Continue lovastatin       VTE prophylaxis: enoxaparin ppx    I have performed a physical exam and reviewed and updated ROS and Plan today (12/29/2021). In review of yesterday's note (12/28/2021), there are no changes except as documented above.    Total time:  40 minutes.  I spent greater than 50% of the time for patient care, counseling, and coordination on this date, including unit/floor time, and face-to-face time with the patient as per interval events and assessment and plan above

## 2021-12-29 NOTE — ASSESSMENT & PLAN NOTE
BP elevated on admission, improved with the addition of amlodipine  Continue amlodipine 10  Continue benazepril 40  IV antihypertensives, hydralazine with parameters

## 2021-12-29 NOTE — ASSESSMENT & PLAN NOTE
De-escalate to Omnicef 5 days total, azithromycin 3 days total  Blood cultures negative  Procalcitonin negative  Incentive spirometry.

## 2021-12-29 NOTE — H&P
"Hospital Medicine History & Physical Note    Date of Service  12/29/2021    Primary Care Physician  Mariya Bhardwaj M.D.    Consultants  None    Code Status  Full Code    Chief Complaint  Chief Complaint   Patient presents with   • GLF     Low back and tailbone pain with difficulty ambulating after slip and fall tonight.        History of Presenting Illness  Diana Tristan is a 88 y.o. female with  hypertension, hyperlipidemia, osteopenia, COPD not on home O2 who presented 12/28/2021 with ground-level fall found to be hypoxic.  Patient states she was walking to her room this evening to return a large phone when she became weak and began falling slowly backwards. She says she \"slid\" down onto her butt on the ground and was too weak and had too much pain to get up. Her pain in her tailbone was severe and achy, no radiating pain, aggravated by movement alleviated with rest. She denies head injury, syncope or loss of consciousness. She has had some weakness/malaise over the past few days but denies fevers or chills, denies cough or chest tightness, denies dyspnea, denies nausea, vomiting, diarrhea or dysuria.  She was noted to be hypoxic by EMS, 84 to 87% on room air.  She was pretty hypertensive on arrival SBP up to 200s and was requiring 2 L nasal cannula to keep oxygen greater than 90%, HR and RR wnl.  Initial work-up CBC without leukocytosis, CHEM panel with normal electrolytes renal function and liver function.  Chest x-ray showed left perihilar airspace opacity concerning for pneumonia and she had CT L-spine and pelvis which showed acute compression deformity of L1 extending into the posterior cortex as well as a comminuted fracture of the right ischial tuberosity and mildly comminuted and displaced right inferior pubic ramus fracture.  She was given ceftriaxone and azithromycin and referred to hospitalist for admission.      I discussed the plan of care with patient, family, bedside RN and " pharmacy.    Review of Systems  Review of Systems   Constitutional: Positive for malaise/fatigue. Negative for chills and fever.   HENT: Negative for congestion and sinus pain.    Eyes: Negative for photophobia and pain.   Respiratory: Negative for cough, sputum production, shortness of breath and wheezing.    Cardiovascular: Negative for chest pain and palpitations.   Gastrointestinal: Negative for abdominal pain, diarrhea, nausea and vomiting.   Genitourinary: Negative for dysuria and urgency.   Musculoskeletal: Positive for back pain and falls.   Neurological: Negative for focal weakness, seizures and loss of consciousness.   Psychiatric/Behavioral: Negative for substance abuse. The patient is not nervous/anxious.        Past Medical History   has a past medical history of Hypertension and UTI (urinary tract infection).    Surgical History   has a past surgical history that includes US-CYST ASPIRATION-BREAST INITIAL.     Family History  family history is not on file.   Family history reviewed with patient. There {is/is no:06325} family history that is pertinent to the chief complaint.     Social History   reports that she quit smoking about 6 years ago. She has never used smokeless tobacco. She reports that she does not drink alcohol and does not use drugs.    Allergies  Allergies   Allergen Reactions   • Augmentin [Amoxicillin-Pot Clavulanate] Hives   • Bactrim [Sulfamethoxazole W-Trimethoprim] Hives   • Diclofenac Swelling   • Codeine Vomiting and Nausea       Medications  Prior to Admission Medications   Prescriptions Last Dose Informant Patient Reported? Taking?   Cholecalciferol (VITAMIN D3) 1000 units Cap  Patient Yes No   Sig: Take 1,000 Units by mouth every morning.   benazepril (LOTENSIN) 40 MG tablet  Patient No No   Sig: Take 1 Tab by mouth every day.   lovastatin (MEVACOR) 10 MG tablet  Patient Yes No   Sig: Take 10 mg by mouth every evening.      Facility-Administered Medications: None       Physical  Exam  Temp:  [36.4 °C (97.5 °F)] 36.4 °C (97.5 °F)  Pulse:  [73-76] 76  Resp:  [16] 16  BP: (198-204)/(76-85) 198/85  SpO2:  [87 %-88 %] 88 %  Blood Pressure : (!) 198/85   Temperature: 36.4 °C (97.5 °F)   Pulse: 76   Respiration: 16   Pulse Oximetry: 88 %       Physical Exam  Vitals and nursing note reviewed. Exam conducted with a chaperone present.   Constitutional:       General: She is not in acute distress.     Appearance: She is normal weight. She is not toxic-appearing.      Comments: 88-year-old female appears stated age, drowsy although conversant, able to speak full sentences without becoming breathless, no acute distress   HENT:      Head: Normocephalic and atraumatic.      Nose: Nose normal. No rhinorrhea.      Mouth/Throat:      Mouth: Mucous membranes are dry.      Pharynx: Oropharynx is clear.   Eyes:      Extraocular Movements: Extraocular movements intact.      Conjunctiva/sclera: Conjunctivae normal.      Pupils: Pupils are equal, round, and reactive to light.   Cardiovascular:      Rate and Rhythm: Normal rate and regular rhythm.      Pulses: Normal pulses.      Heart sounds: No murmur heard.      Pulmonary:      Effort: Pulmonary effort is normal. No respiratory distress.      Breath sounds: Rales (Left sided mid-lung zone) present. No wheezing or rhonchi.   Abdominal:      General: Bowel sounds are normal.      Palpations: Abdomen is soft.      Tenderness: There is no abdominal tenderness. There is no guarding or rebound.   Musculoskeletal:         General: Tenderness present. No deformity.      Cervical back: Normal range of motion and neck supple. No rigidity or tenderness.      Right lower leg: No edema.      Left lower leg: No edema.      Comments: Mild tenderness to left pelvis, pain with movement of pelvis right legs, movement appears intact throughout although slightly reduced in hip 2/2 pain from movement   Skin:     General: Skin is warm and dry.      Capillary Refill: Capillary refill  takes less than 2 seconds.   Neurological:      General: No focal deficit present.      Mental Status: She is alert and oriented to person, place, and time. Mental status is at baseline.      Cranial Nerves: No cranial nerve deficit.      Sensory: No sensory deficit.      Motor: No weakness.      Comments: Face symmetrical, tongue midline, moves all 4 extremities and follows commands         Laboratory:  Recent Labs     12/28/21  2335   WBC 8.3   RBC 5.17   HEMOGLOBIN 16.1*   HEMATOCRIT 47.8*   MCV 92.5   MCH 31.1   MCHC 33.7   RDW 45.6   PLATELETCT 164   MPV 10.3     Recent Labs     12/28/21  2335   SODIUM 135   POTASSIUM 3.7   CHLORIDE 98   CO2 28   GLUCOSE 126*   BUN 17   CREATININE 0.73   CALCIUM 9.8     Recent Labs     12/28/21  2335   ALTSGPT 19   ASTSGOT 30   ALKPHOSPHAT 74   TBILIRUBIN 0.5   GLUCOSE 126*         No results for input(s): NTPROBNP in the last 72 hours.      No results for input(s): TROPONINT in the last 72 hours.    Imaging:  CT-LSPINE W/O PLUS RECONS   Final Result      1.  Acute compression deformity of L1 extending into the posterior cortex. Trace retropulsion of the superior aspect.   2.  Multilevel degenerative changes.   3.  Partially visualized bibasilar fibrotic changes.   4.  Small hiatal hernia.      CT-PELVIS W/O PLUS RECONS   Final Result         1.  Comminuted fracture of the right ischial tuberosity.   2.  Mildly comminuted and displaced right inferior pubic ramus fracture. Small adjacent hematoma.   3.  Evaluation for additional periprosthetic fracture is suboptimal due to streak artifact and osteopenia.   4.  Atherosclerotic changes.      DX-CHEST-PORTABLE (1 VIEW)   Final Result      1.  Left perihilar airspace opacity concerning for pneumonia.          X-Ray:  {IP PROVIDER REVIEW:[id number]}  EKG:  {IP PROVIDER REVIEW:[id number]}    Assessment/Plan:  I anticipate this patient {Anticipated Status:48240}    * Acute respiratory failure with hypoxia (HCC)- (present on  admission)  Assessment & Plan  Secondary to pneumonia.  Oxygen per guidelines, wean as able.  Incentive spirometry, pulmonary hygiene  DuoNebs as needed.      Severe protein-calorie malnutrition (HCC)  Assessment & Plan  Dietary consult, nutritional supplements    Closed compression fracture of body of L1 vertebra ([facility])- (present on admission)  Assessment & Plan  Mild low back pain.  PT OT.  We will see how patient does with PT OT and movement in the morning, if she has continued severe pain she may benefit from IR consultation for possible kyphoplasty.  Pain control    Multiple fractures of pelvis ([facility])- (present on admission)  Assessment & Plan  Comminuted fracture of right ischial tuberosity as well as mild comminuted displaced right inferior pubic ramus fracture.  Pain control  Orthopedic consult in the morning.  Fall precautions.  PT OT      Pneumonia of left upper lobe due to infectious organism- (present on admission)  Assessment & Plan  Continue ceftriaxone and azithromycin.  Follow-up blood cultures.  Check procalcitonin.  Incentive spirometry.      Essential hypertension- (present on admission)  Assessment & Plan  Continue benazepril.  IV antihypertensives with parameters    COPD (chronic obstructive pulmonary disease) ([facility])- (present on admission)  Assessment & Plan  Not in acute exacerbation.  Breathing treatments as needed    Hyperlipidemia- (present on admission)  Assessment & Plan  Continue lovastatin      VTE prophylaxis: {VTE selection:87909}

## 2021-12-29 NOTE — ASSESSMENT & PLAN NOTE
Comminuted fracture of right ischial tuberosity as well as mild comminuted displaced right inferior pubic ramus fracture.  I discussed the case with orthopedic surgery, Dr. Bae  Non-operative management  WBAT  Pain control w PRN tylenol and norco if needed  PT OT recommended SNF, placement pending

## 2021-12-29 NOTE — ASSESSMENT & PLAN NOTE
Likely secondary to immobility, narcotics  Straight cath x1, not urinating okay  Continue to monitor

## 2021-12-29 NOTE — ASSESSMENT & PLAN NOTE
Secondary to pneumonia-still needing between 0 and 2 L  Incentive spirometry, pulmonary hygiene  DuoNebs as needed.

## 2021-12-29 NOTE — H&P
"Hospital Medicine History & Physical Note    Date of Service  12/29/2021    Primary Care Physician  Mariya Bhardwaj M.D.    Consultants  None    Code Status  Full Code    Chief Complaint  Chief Complaint   Patient presents with   • GLF     Low back and tailbone pain with difficulty ambulating after slip and fall tonight.        History of Presenting Illness  Diana Tristan is a 88 y.o. female with  hypertension, hyperlipidemia, osteopenia, COPD not on home O2 who presented 12/28/2021 with ground-level fall found to be hypoxic.  Patient states she was walking to her room this evening to return a large phone when she became weak and began falling slowly backwards. She says she \"slid\" down onto her butt on the ground and was too weak and had too much pain to get up. Her pain in her tailbone was severe and achy, no radiating pain, aggravated by movement alleviated with rest. She denies head injury, syncope or loss of consciousness. She has had some weakness/malaise over the past few days but denies fevers or chills, denies cough or chest tightness, denies dyspnea, denies nausea, vomiting, diarrhea or dysuria.  She was noted to be hypoxic by EMS, 84 to 87% on room air.  She was pretty hypertensive on arrival SBP up to 200s and was requiring 2 L nasal cannula to keep oxygen greater than 90%, HR and RR wnl.  Initial work-up CBC without leukocytosis, CHEM panel with normal electrolytes renal function and liver function.  Chest x-ray showed left perihilar airspace opacity concerning for pneumonia and she had CT L-spine and pelvis which showed acute compression deformity of L1 extending into the posterior cortex as well as a comminuted fracture of the right ischial tuberosity and mildly comminuted and displaced right inferior pubic ramus fracture.  She was given ceftriaxone and azithromycin and referred to hospitalist for admission.      I discussed the plan of care with patient, family, bedside RN and " pharmacy.    Review of Systems  Review of Systems   Constitutional: Positive for malaise/fatigue. Negative for chills and fever.   HENT: Negative for congestion and sinus pain.    Eyes: Negative for photophobia and pain.   Respiratory: Negative for cough, sputum production, shortness of breath and wheezing.    Cardiovascular: Negative for chest pain and palpitations.   Gastrointestinal: Negative for abdominal pain, diarrhea, nausea and vomiting.   Genitourinary: Negative for dysuria and urgency.   Musculoskeletal: Positive for back pain and falls.   Neurological: Negative for focal weakness, seizures and loss of consciousness.   Psychiatric/Behavioral: Negative for substance abuse. The patient is not nervous/anxious.        Past Medical History   has a past medical history of Hypertension and UTI (urinary tract infection).    Surgical History   has a past surgical history that includes US-CYST ASPIRATION-BREAST INITIAL.     Family History  family history is not on file.   Family history reviewed with patient. There is no family history that is pertinent to the chief complaint.     Social History   reports that she quit smoking about 6 years ago. She has never used smokeless tobacco. She reports that she does not drink alcohol and does not use drugs.    Allergies  Allergies   Allergen Reactions   • Augmentin [Amoxicillin-Pot Clavulanate] Hives   • Bactrim [Sulfamethoxazole W-Trimethoprim] Hives   • Diclofenac Swelling   • Codeine Vomiting and Nausea       Medications  Prior to Admission Medications   Prescriptions Last Dose Informant Patient Reported? Taking?   Cholecalciferol (VITAMIN D3) 1000 units Cap  Patient Yes No   Sig: Take 1,000 Units by mouth every morning.   benazepril (LOTENSIN) 40 MG tablet  Patient No No   Sig: Take 1 Tab by mouth every day.   lovastatin (MEVACOR) 10 MG tablet  Patient Yes No   Sig: Take 10 mg by mouth every evening.      Facility-Administered Medications: None       Physical  Exam  Temp:  [36.4 °C (97.5 °F)] 36.4 °C (97.5 °F)  Pulse:  [65-76] 69  Resp:  [16] 16  BP: (150-204)/(69-85) 201/73  SpO2:  [87 %-99 %] 99 %  Blood Pressure : (!) 198/85   Temperature: 36.4 °C (97.5 °F)   Pulse: 76   Respiration: 16   Pulse Oximetry: 88 %       Physical Exam  Vitals and nursing note reviewed. Exam conducted with a chaperone present.   Constitutional:       General: She is not in acute distress.     Appearance: She is normal weight. She is not toxic-appearing.      Comments: 88-year-old female appears stated age, drowsy although conversant, able to speak full sentences without becoming breathless, no acute distress   HENT:      Head: Normocephalic and atraumatic.      Nose: Nose normal. No rhinorrhea.      Mouth/Throat:      Mouth: Mucous membranes are dry.      Pharynx: Oropharynx is clear.   Eyes:      Extraocular Movements: Extraocular movements intact.      Conjunctiva/sclera: Conjunctivae normal.      Pupils: Pupils are equal, round, and reactive to light.   Cardiovascular:      Rate and Rhythm: Normal rate and regular rhythm.      Pulses: Normal pulses.      Heart sounds: No murmur heard.      Pulmonary:      Effort: Pulmonary effort is normal. No respiratory distress.      Breath sounds: Rales (Left sided mid-lung zone) present. No wheezing or rhonchi.   Abdominal:      General: Bowel sounds are normal.      Palpations: Abdomen is soft.      Tenderness: There is no abdominal tenderness. There is no guarding or rebound.   Musculoskeletal:         General: Tenderness present. No deformity.      Cervical back: Normal range of motion and neck supple. No rigidity or tenderness.      Right lower leg: No edema.      Left lower leg: No edema.      Comments: Mild tenderness to left pelvis, pain with movement of pelvis right legs, movement appears intact throughout although slightly reduced in hip 2/2 pain from movement   Skin:     General: Skin is warm and dry.      Capillary Refill: Capillary refill  takes less than 2 seconds.   Neurological:      General: No focal deficit present.      Mental Status: She is alert and oriented to person, place, and time. Mental status is at baseline.      Cranial Nerves: No cranial nerve deficit.      Sensory: No sensory deficit.      Motor: No weakness.      Comments: Face symmetrical, tongue midline, moves all 4 extremities and follows commands         Laboratory:  Recent Labs     12/28/21  2335   WBC 8.3   RBC 5.17   HEMOGLOBIN 16.1*   HEMATOCRIT 47.8*   MCV 92.5   MCH 31.1   MCHC 33.7   RDW 45.6   PLATELETCT 164   MPV 10.3     Recent Labs     12/28/21  2335   SODIUM 135   POTASSIUM 3.7   CHLORIDE 98   CO2 28   GLUCOSE 126*   BUN 17   CREATININE 0.73   CALCIUM 9.8     Recent Labs     12/28/21  2335   ALTSGPT 19   ASTSGOT 30   ALKPHOSPHAT 74   TBILIRUBIN 0.5   GLUCOSE 126*         No results for input(s): NTPROBNP in the last 72 hours.      No results for input(s): TROPONINT in the last 72 hours.    Imaging:  CT-LSPINE W/O PLUS RECONS   Final Result      1.  Acute compression deformity of L1 extending into the posterior cortex. Trace retropulsion of the superior aspect.   2.  Multilevel degenerative changes.   3.  Partially visualized bibasilar fibrotic changes.   4.  Small hiatal hernia.      CT-PELVIS W/O PLUS RECONS   Final Result         1.  Comminuted fracture of the right ischial tuberosity.   2.  Mildly comminuted and displaced right inferior pubic ramus fracture. Small adjacent hematoma.   3.  Evaluation for additional periprosthetic fracture is suboptimal due to streak artifact and osteopenia.   4.  Atherosclerotic changes.      DX-CHEST-PORTABLE (1 VIEW)   Final Result      1.  Left perihilar airspace opacity concerning for pneumonia.          X-Ray:  I have personally reviewed the images and compared with prior images. and My impression is: Left perihilar opacity concerning for pneumonia  EKG:  I have personally reviewed the images and compared with prior images.  and No film today    Assessment/Plan:  I anticipate this patient will require at least two midnights for appropriate medical management, necessitating inpatient admission.    * Acute respiratory failure with hypoxia (HCC)- (present on admission)  Assessment & Plan  Secondary to pneumonia.  Oxygen per guidelines, wean as able.  Incentive spirometry, pulmonary hygiene  DuoNebs as needed.      Severe protein-calorie malnutrition (HCC)  Assessment & Plan  Dietary consult, nutritional supplements    Closed compression fracture of body of L1 vertebra ([facility])- (present on admission)  Assessment & Plan  Mild low back pain.  PT OT.  We will see how patient does with PT OT and movement in the morning, if she has continued severe pain she may benefit from IR consultation for possible kyphoplasty.  Pain control    Multiple fractures of pelvis ([facility])- (present on admission)  Assessment & Plan  Comminuted fracture of right ischial tuberosity as well as mild comminuted displaced right inferior pubic ramus fracture.  Pain control  Orthopedic consult in the morning.  Fall precautions.  PT OT      Pneumonia of left upper lobe due to infectious organism- (present on admission)  Assessment & Plan  Continue ceftriaxone and azithromycin.  Follow-up blood cultures.  Check procalcitonin.  Incentive spirometry.      Essential hypertension- (present on admission)  Assessment & Plan  Continue benazepril.  IV antihypertensives with parameters    COPD (chronic obstructive pulmonary disease) ([facility])- (present on admission)  Assessment & Plan  Not in acute exacerbation.  Breathing treatments as needed    Hyperlipidemia- (present on admission)  Assessment & Plan  Continue lovastatin      VTE prophylaxis: enoxaparin ppx

## 2021-12-29 NOTE — ED PROVIDER NOTES
"ED Provider Note    ED Provider Note    Scribed for Rosie Saucedo MD by Rosie Saucedo M.D.. 12/28/2021, 11:22 PM.    Primary care provider: Mariya Bhardwaj M.D.  Means of arrival: EMS  History obtained from: Patient and spouse  History limited by: None    CHIEF COMPLAINT  Chief Complaint   Patient presents with   • GLF     Low back and tailbone pain with difficulty ambulating after slip and fall tonight.        HPI  Diana Tristan is a 88 y.o. female who presents to the Emergency Department for evaluation of mechanical ground-level fall.  Patient relates she was walking in her living room this evening to put her telephone back in its cradle, she notes that she fell backwards, striking her buttocks on the ground and pushing her table back.  She does not think she hit her head.  Patient denies any syncope, denies any loss of consciousness.  Patient relates she was able to only \"sit there\" after the fall and has been unable to ambulate since.  She arrives via EMS.  She notes pain is localized to the low back in the \"tailbone\", it does not radiate down the legs.  Pain is in the midline, not involving the right or the left.  She never had any back surgeries but  relates she is status post right total hip replacement.  She has no numbness no weakness.  She notes that she is not a diabetic and is not anticoagulated.  No head or neck pain or injury.  She did not receive any narcotic analgesics via EMS but is found to be hypoxic persistently, oxygen saturation from 84% and 87%, but she denies any dyspnea or fever.    REVIEW OF SYSTEMS  Pertinent positives include low back pain with blunt trauma, acutely unable to ambulate. Pertinent negatives include no syncope, no loss of conscious, no head injury, no numbness.  All other systems reviewed and negative.    PAST MEDICAL HISTORY   has a past medical history of Hypertension and UTI (urinary tract infection).    SURGICAL HISTORY   has a past " "surgical history that includes US-CYST ASPIRATION-BREAST INITIAL.    SOCIAL HISTORY  Social History     Tobacco Use   • Smoking status: Former Smoker     Quit date: 2015     Years since quittin.9   • Smokeless tobacco: Never Used   Substance Use Topics   • Alcohol use: No   • Drug use: No      Social History     Substance and Sexual Activity   Drug Use No       FAMILY HISTORY  Noncontributory for trauma    CURRENT MEDICATIONS  Home Medications    **Home medications have not yet been reviewed for this encounter**         ALLERGIES  Allergies   Allergen Reactions   • Augmentin [Amoxicillin-Pot Clavulanate] Hives   • Bactrim [Sulfamethoxazole W-Trimethoprim] Hives   • Diclofenac Swelling   • Codeine Vomiting and Nausea       PHYSICAL EXAM  VITAL SIGNS: BP (!) 198/85   Pulse 76   Temp 36.4 °C (97.5 °F) (Temporal)   Resp 16   Ht 1.676 m (5' 6\")   Wt 44 kg (97 lb)   SpO2 88%   BMI 15.66 kg/m²     General: Alert, no acute distress  Skin: Warm, dry, normal for ethnicity  Head: Normocephalic, atraumatic  Neck: Trachea midline, no tenderness  Eye: PERRL, normal conjunctiva, extraocular movements intact.  Cardiovascular: Regular rate and rhythm, No murmur, Normal peripheral perfusion  Respiratory: Lungs CTA, respirations are non-labored, breath sounds are equal.  No evidence of increased work of breathing.  Gastrointestinal: Soft, nontender, non distended  Musculoskeletal: No swelling, no deformity.  Midline tenderness to palpation approximating the lower lumbar as well as the sacrococcygeal spine, no step-off, no paraspinous tenderness.  Neurological: Alert and oriented to person, place, time, and situation.  Distal function with regard to dorsal plantarflexion of both feet is fully intact, 5 x 5, as is sensation to light touch bilaterally.  Psychiatric: Cooperative, appropriate mood & affect      DIAGNOSTIC STUDIES/PROCEDURES    LABS  Results for orders placed or performed during the hospital encounter of " 12/28/21   CBC w/ Differential   Result Value Ref Range    WBC 8.3 4.8 - 10.8 K/uL    RBC 5.17 4.20 - 5.40 M/uL    Hemoglobin 16.1 (H) 12.0 - 16.0 g/dL    Hematocrit 47.8 (H) 37.0 - 47.0 %    MCV 92.5 81.4 - 97.8 fL    MCH 31.1 27.0 - 33.0 pg    MCHC 33.7 33.6 - 35.0 g/dL    RDW 45.6 35.9 - 50.0 fL    Platelet Count 164 164 - 446 K/uL    MPV 10.3 9.0 - 12.9 fL    Neutrophils-Polys 78.10 (H) 44.00 - 72.00 %    Lymphocytes 11.20 (L) 22.00 - 41.00 %    Monocytes 8.60 0.00 - 13.40 %    Eosinophils 0.60 0.00 - 6.90 %    Basophils 0.20 0.00 - 1.80 %    Immature Granulocytes 1.30 (H) 0.00 - 0.90 %    Nucleated RBC 0.00 /100 WBC    Neutrophils (Absolute) 6.44 2.00 - 7.15 K/uL    Lymphs (Absolute) 0.92 (L) 1.00 - 4.80 K/uL    Monos (Absolute) 0.71 0.00 - 0.85 K/uL    Eos (Absolute) 0.05 0.00 - 0.51 K/uL    Baso (Absolute) 0.02 0.00 - 0.12 K/uL    Immature Granulocytes (abs) 0.11 0.00 - 0.11 K/uL    NRBC (Absolute) 0.00 K/uL   Complete Metabolic Panel (CMP)   Result Value Ref Range    Sodium 135 135 - 145 mmol/L    Potassium 3.7 3.6 - 5.5 mmol/L    Chloride 98 96 - 112 mmol/L    Co2 28 20 - 33 mmol/L    Anion Gap 9.0 7.0 - 16.0    Glucose 126 (H) 65 - 99 mg/dL    Bun 17 8 - 22 mg/dL    Creatinine 0.73 0.50 - 1.40 mg/dL    Calcium 9.8 8.4 - 10.2 mg/dL    AST(SGOT) 30 12 - 45 U/L    ALT(SGPT) 19 2 - 50 U/L    Alkaline Phosphatase 74 30 - 99 U/L    Total Bilirubin 0.5 0.1 - 1.5 mg/dL    Albumin 4.2 3.2 - 4.9 g/dL    Total Protein 6.5 6.0 - 8.2 g/dL    Globulin 2.3 1.9 - 3.5 g/dL    A-G Ratio 1.8 g/dL   ESTIMATED GFR   Result Value Ref Range    GFR If African American >60 >60 mL/min/1.73 m 2    GFR If Non African American >60 >60 mL/min/1.73 m 2   LACTIC ACID   Result Value Ref Range    Lactic Acid 1.0 0.5 - 2.0 mmol/L     All labs reviewed by me.      RADIOLOGY  CT-LSPINE W/O PLUS RECONS   Final Result      1.  Acute compression deformity of L1 extending into the posterior cortex. Trace retropulsion of the superior aspect.   2.   Multilevel degenerative changes.   3.  Partially visualized bibasilar fibrotic changes.   4.  Small hiatal hernia.      CT-PELVIS W/O PLUS RECONS   Final Result         1.  Comminuted fracture of the right ischial tuberosity.   2.  Mildly comminuted and displaced right inferior pubic ramus fracture. Small adjacent hematoma.   3.  Evaluation for additional periprosthetic fracture is suboptimal due to streak artifact and osteopenia.   4.  Atherosclerotic changes.      DX-CHEST-PORTABLE (1 VIEW)   Final Result      1.  Left perihilar airspace opacity concerning for pneumonia.        The radiologist's interpretation of all radiological studies have been reviewed by me.    COURSE & MEDICAL DECISION MAKING  Pertinent Labs & Imaging studies reviewed. (See chart for details)    11:22 PM - Patient seen and examined at bedside. Patient declines analgesia initially. Ordered CT imaging of the pelvis and the lumbar spine to evaluate her symptoms. The differential diagnoses include but are not limited to: Compression fracture, lumbar fracture, sacrococcygeal fracture    0020: X-ray is concerning for pneumonia.  Thankfully she demonstrates no leukocytosis, no fever, no hypotension, no tachycardia, this would not be consistent with sepsis.  I have ordered appropriate IV antibiotics, those given Rocephin and azithromycin, blood culture sent and pending.    0107: I spoke with the hospitalist who concurs with plan for admission.  CT demonstrates L1 compression fracture, surprisingly no injury to the sacrococcygeal region though CT of the pelvis demonstrates evidence of fracture to the inferior right pubic ramus as well as the right ischial tuberosity.    Decision Making:  This is a 88 y.o. year old female who presents with mechanical ground-level fall with injury to the low back/tailbone.  She is midline tender on the sacrococcygeal region, surprisingly CT shows compression fracture higher at L1 the patient does have multiple  fractures to the pelvis as described above, none of these require emergent operative intervention at this time.  She is neurovascularly intact with no foot drop and no saddle anesthesia.  No indication for emergent surgical intervention.  Patient treated with morphine here for pain, she is acutely unable to ambulate secondary to her discomfort and in addition is also hypoxic and has evidence of pneumonia on imaging.  Dentures require supplemental oxygen she will need to be admitted.  I have ordered IV antibiotics after blood cultures drawn, thankfully there is no evidence suggestive of sepsis.    Patient admitted to the care of the hospitalist to medical surgical floor in improved but guarded condition.    FINAL IMPRESSION  1. Pneumonia of left upper lobe due to infectious organism    2. Hypoxia    3. Compression fracture of L1 vertebra, initial encounter ([facility])    4. Dehydration    5. Fracture of ischial tuberosity, right, closed, initial encounter ([facility])    6. Closed fracture of right inferior pubic ramus, initial encounter ([facility])          Rosie CUTLER M.D. (Scribe), am scribing for, and in the presence of, Rosie Saucedo MD.    Electronically signed by: Rosie Saucedo M.D. (Scribe), 12/28/2021    Rosie CUTLER MD personally performed the services described in this documentation, as scribed by Rosie Saucedo M.D. in my presence, and it is both accurate and complete    The note accurately reflects work and decisions made by me.  Rosie Saucedo M.D.  12/29/2021  1:08 AM

## 2021-12-29 NOTE — PROGRESS NOTES
Pt arrived on unit. POC and medications reviewed with pt. Pt verbalized understanding. Pt stated pain medication in MAR was given by ER RN. Safety measures in place. Will continue to monitor.

## 2021-12-29 NOTE — ED TRIAGE NOTES
"Chief Complaint   Patient presents with   • GLF     Low back and tailbone pain with difficulty ambulating after slip and fall tonight.      BP (!) 204/76   Pulse 73   Temp 36.4 °C (97.5 °F) (Temporal)   Resp 16   Ht 1.676 m (5' 6\")   Wt 44 kg (97 lb)   SpO2 (!) 87%   BMI 15.66 kg/m²     "

## 2021-12-29 NOTE — ED NOTES
Pharmacy Medication Reconciliation      ~Medication reconciliation updated and complete per patient at bedside  ~Allergies have been verified and updated   ~No oral ABX within the last 30 days  ~Patient home pharmacy:[facility]

## 2021-12-30 LAB
ALBUMIN SERPL BCP-MCNC: 3.6 G/DL (ref 3.2–4.9)
BASOPHILS # BLD AUTO: 0.3 % (ref 0–1.8)
BASOPHILS # BLD: 0.02 K/UL (ref 0–0.12)
BUN SERPL-MCNC: 22 MG/DL (ref 8–22)
CALCIUM SERPL-MCNC: 8.9 MG/DL (ref 8.4–10.2)
CHLORIDE SERPL-SCNC: 100 MMOL/L (ref 96–112)
CO2 SERPL-SCNC: 26 MMOL/L (ref 20–33)
CREAT SERPL-MCNC: 0.84 MG/DL (ref 0.5–1.4)
EOSINOPHIL # BLD AUTO: 0.1 K/UL (ref 0–0.51)
EOSINOPHIL NFR BLD: 1.4 % (ref 0–6.9)
ERYTHROCYTE [DISTWIDTH] IN BLOOD BY AUTOMATED COUNT: 48.6 FL (ref 35.9–50)
GLUCOSE SERPL-MCNC: 105 MG/DL (ref 65–99)
HCT VFR BLD AUTO: 49.6 % (ref 37–47)
HGB BLD-MCNC: 16.1 G/DL (ref 12–16)
IMM GRANULOCYTES # BLD AUTO: 0.01 K/UL (ref 0–0.11)
IMM GRANULOCYTES NFR BLD AUTO: 0.1 % (ref 0–0.9)
LYMPHOCYTES # BLD AUTO: 1.51 K/UL (ref 1–4.8)
LYMPHOCYTES NFR BLD: 21.3 % (ref 22–41)
MAGNESIUM SERPL-MCNC: 2.1 MG/DL (ref 1.5–2.5)
MCH RBC QN AUTO: 30.8 PG (ref 27–33)
MCHC RBC AUTO-ENTMCNC: 32.5 G/DL (ref 33.6–35)
MCV RBC AUTO: 95 FL (ref 81.4–97.8)
MONOCYTES # BLD AUTO: 0.84 K/UL (ref 0–0.85)
MONOCYTES NFR BLD AUTO: 11.8 % (ref 0–13.4)
NEUTROPHILS # BLD AUTO: 4.62 K/UL (ref 2–7.15)
NEUTROPHILS NFR BLD: 65.1 % (ref 44–72)
NRBC # BLD AUTO: 0 K/UL
NRBC BLD-RTO: 0 /100 WBC
PHOSPHATE SERPL-MCNC: 3.3 MG/DL (ref 2.5–4.5)
PLATELET # BLD AUTO: 151 K/UL (ref 164–446)
PMV BLD AUTO: 10.3 FL (ref 9–12.9)
POTASSIUM SERPL-SCNC: 4.4 MMOL/L (ref 3.6–5.5)
RBC # BLD AUTO: 5.22 M/UL (ref 4.2–5.4)
SODIUM SERPL-SCNC: 137 MMOL/L (ref 135–145)
WBC # BLD AUTO: 7.1 K/UL (ref 4.8–10.8)

## 2021-12-30 PROCEDURE — 99232 SBSQ HOSP IP/OBS MODERATE 35: CPT | Performed by: INTERNAL MEDICINE

## 2021-12-30 PROCEDURE — 85025 COMPLETE CBC W/AUTO DIFF WBC: CPT

## 2021-12-30 PROCEDURE — 83735 ASSAY OF MAGNESIUM: CPT

## 2021-12-30 PROCEDURE — 700102 HCHG RX REV CODE 250 W/ 637 OVERRIDE(OP): Performed by: INTERNAL MEDICINE

## 2021-12-30 PROCEDURE — 97162 PT EVAL MOD COMPLEX 30 MIN: CPT

## 2021-12-30 PROCEDURE — 97165 OT EVAL LOW COMPLEX 30 MIN: CPT

## 2021-12-30 PROCEDURE — 700111 HCHG RX REV CODE 636 W/ 250 OVERRIDE (IP): Performed by: STUDENT IN AN ORGANIZED HEALTH CARE EDUCATION/TRAINING PROGRAM

## 2021-12-30 PROCEDURE — 99222 1ST HOSP IP/OBS MODERATE 55: CPT | Performed by: ORTHOPAEDIC SURGERY

## 2021-12-30 PROCEDURE — 94760 N-INVAS EAR/PLS OXIMETRY 1: CPT

## 2021-12-30 PROCEDURE — A9270 NON-COVERED ITEM OR SERVICE: HCPCS | Performed by: INTERNAL MEDICINE

## 2021-12-30 PROCEDURE — 700111 HCHG RX REV CODE 636 W/ 250 OVERRIDE (IP): Performed by: INTERNAL MEDICINE

## 2021-12-30 PROCEDURE — A9270 NON-COVERED ITEM OR SERVICE: HCPCS | Performed by: STUDENT IN AN ORGANIZED HEALTH CARE EDUCATION/TRAINING PROGRAM

## 2021-12-30 PROCEDURE — 700102 HCHG RX REV CODE 250 W/ 637 OVERRIDE(OP): Performed by: STUDENT IN AN ORGANIZED HEALTH CARE EDUCATION/TRAINING PROGRAM

## 2021-12-30 PROCEDURE — 700105 HCHG RX REV CODE 258: Performed by: STUDENT IN AN ORGANIZED HEALTH CARE EDUCATION/TRAINING PROGRAM

## 2021-12-30 PROCEDURE — 36415 COLL VENOUS BLD VENIPUNCTURE: CPT

## 2021-12-30 PROCEDURE — 94669 MECHANICAL CHEST WALL OSCILL: CPT

## 2021-12-30 PROCEDURE — 80069 RENAL FUNCTION PANEL: CPT

## 2021-12-30 PROCEDURE — 770006 HCHG ROOM/CARE - MED/SURG/GYN SEMI*

## 2021-12-30 RX ORDER — CEFDINIR 300 MG/1
300 CAPSULE ORAL EVERY 12 HOURS
Status: DISCONTINUED | OUTPATIENT
Start: 2021-12-31 | End: 2022-01-01 | Stop reason: HOSPADM

## 2021-12-30 RX ORDER — BUDESONIDE AND FORMOTEROL FUMARATE DIHYDRATE 80; 4.5 UG/1; UG/1
2 AEROSOL RESPIRATORY (INHALATION)
Status: DISCONTINUED | OUTPATIENT
Start: 2021-12-30 | End: 2022-01-01 | Stop reason: HOSPADM

## 2021-12-30 RX ADMIN — CEFTRIAXONE SODIUM 1 G: 1 INJECTION, POWDER, FOR SOLUTION INTRAMUSCULAR; INTRAVENOUS at 05:33

## 2021-12-30 RX ADMIN — ACETAMINOPHEN 650 MG: 325 TABLET, FILM COATED ORAL at 16:08

## 2021-12-30 RX ADMIN — ENOXAPARIN SODIUM 30 MG: 30 INJECTION SUBCUTANEOUS at 05:34

## 2021-12-30 RX ADMIN — AMLODIPINE BESYLATE 10 MG: 5 TABLET ORAL at 05:33

## 2021-12-30 RX ADMIN — LOVASTATIN 10 MG: 20 TABLET ORAL at 21:08

## 2021-12-30 RX ADMIN — AZITHROMYCIN MONOHYDRATE 500 MG: 250 TABLET ORAL at 05:33

## 2021-12-30 RX ADMIN — BENAZEPRIL HYDROCHLORIDE 40 MG: 10 TABLET ORAL at 05:33

## 2021-12-30 ASSESSMENT — PAIN DESCRIPTION - PAIN TYPE
TYPE: ACUTE PAIN
TYPE: ACUTE PAIN

## 2021-12-30 ASSESSMENT — COGNITIVE AND FUNCTIONAL STATUS - GENERAL
CLIMB 3 TO 5 STEPS WITH RAILING: TOTAL
HELP NEEDED FOR BATHING: A LOT
DAILY ACTIVITIY SCORE: 17
STANDING UP FROM CHAIR USING ARMS: A LITTLE
WALKING IN HOSPITAL ROOM: A LITTLE
DRESSING REGULAR LOWER BODY CLOTHING: A LOT
TURNING FROM BACK TO SIDE WHILE IN FLAT BAD: A LITTLE
SUGGESTED CMS G CODE MODIFIER MOBILITY: CK
MOVING FROM LYING ON BACK TO SITTING ON SIDE OF FLAT BED: A LITTLE
MOVING TO AND FROM BED TO CHAIR: A LITTLE
SUGGESTED CMS G CODE MODIFIER DAILY ACTIVITY: CK
DRESSING REGULAR UPPER BODY CLOTHING: A LITTLE
MOBILITY SCORE: 16
TOILETING: A LOT

## 2021-12-30 ASSESSMENT — ENCOUNTER SYMPTOMS
HALLUCINATIONS: 0
WEAKNESS: 0
ABDOMINAL PAIN: 0
DEPRESSION: 0
NAUSEA: 0
BACK PAIN: 1
DIARRHEA: 0
FALLS: 1
SORE THROAT: 0
VOMITING: 0
PALPITATIONS: 0
BLOOD IN STOOL: 0
SHORTNESS OF BREATH: 0
CHILLS: 0
HEADACHES: 0
COUGH: 0
HEARTBURN: 0
DIZZINESS: 0
FOCAL WEAKNESS: 0
MYALGIAS: 1
FEVER: 0

## 2021-12-30 ASSESSMENT — GAIT ASSESSMENTS
DEVIATION: ANTALGIC;STEP TO;SHUFFLED GAIT
DISTANCE (FEET): 30
ASSISTIVE DEVICE: FRONT WHEEL WALKER
GAIT LEVEL OF ASSIST: MINIMAL ASSIST

## 2021-12-30 ASSESSMENT — ACTIVITIES OF DAILY LIVING (ADL): TOILETING: INDEPENDENT

## 2021-12-30 NOTE — PROGRESS NOTES
Received report from night RN, assumed care. POC discussed. A&Ox4. Denies pain. Bed in lowest position with call light and belongings within reach.

## 2021-12-30 NOTE — DISCHARGE PLANNING
Anticipated Discharge Disposition: SNF vs Home with HH    Action: Discussed pt in morning rounds. Per MD, anticipates SNF need. PT/OT pending.    LSW met with pt at bedside. Pt lives with spouse. Pt was previously independent with ADLs and uses cane. Pt is able to drive. Pt has prescription coverage and uses [facility] on [address]. Pt states she has an AD with her spouse Richard dyson.     LSW discussed SNF. Pt states she prefers to d/c home. LSW provided SNF list to pt.     Barriers to Discharge: pending PT/OT    Plan: LSW to follow up with PT/OT evals.    1140: LSW able to complete PASRR: [id number]      Care Transition Team Assessment    Information Source  Information Given By: Patient  Informant's Name: Diana  Who is responsible for making decisions for patient? : Patient    Readmission Evaluation  Is this a readmission?: No    Elopement Risk  Legal Hold: No  Ambulatory or Self Mobile in Wheelchair: No-Not an Elopement Risk  Elopement Risk: Not at Risk for Elopement    Interdisciplinary Discharge Planning  Primary Care Physician: Mariya Bhardwaj MD  Patient or legal guardian wants to designate a caregiver: No  Durable Medical Equipment: Other - Specify (cane)    Discharge Preparedness  What is your plan after discharge?: Uncertain - pending medical team collaboration (SNF vs Home with HH)  What are your discharge supports?: Spouse,Child  Prior Functional Level: Ambulatory,Drives Self,Independent with Activities of Daily Living    Functional Assesment  Prior Functional Level: Ambulatory,Drives Self,Independent with Activities of Daily Living    Finances  Financial Barriers to Discharge: No  Prescription Coverage: Yes    Vision / Hearing Impairment  Vision Impairment : No  Hearing Impairment : No              Domestic Abuse  Have you ever been the victim of abuse or violence?: No  Physical Abuse or Sexual Abuse: No  Verbal Abuse or Emotional Abuse: No  Possible Abuse/Neglect Reported to:: Not  Applicable    Psychological Assessment  History of Substance Abuse: None    Discharge Risks or Barriers  Discharge risks or barriers?: No    Anticipated Discharge Information  Discharge Disposition: D/T to SNF with Medicare cert in anticipation of skilled care (03)

## 2021-12-30 NOTE — CARE PLAN
Problem: Nutritional:  Goal: Achieve adequate nutritional intake  Description: Patient will consume 25-50% of meals/supplements  Outcome: Progressing

## 2021-12-30 NOTE — FLOWSHEET NOTE
12/29/21 1700   Events/Summary/Plan   Events/Summary/Plan IS/Pep   Vital Signs   Pulse 84   Respiration 18   Pulse Oximetry 96 %   $ Pulse Oximetry (Spot Check) Yes   Breath Sounds   RUL Breath Sounds Clear   RML Breath Sounds Diminished   RLL Breath Sounds Diminished   SHANTELLE Breath Sounds Fine Crackles   LLL Breath Sounds Fine Crackles   Secretions   Cough Dry;Non Productive   Oxygen   O2 (LPM) 2   Oxygen Equipment Initial Setup   O2 Delivery Device Silicone Nasal Cannula

## 2021-12-30 NOTE — CARE PLAN
The patient is Stable - Low risk of patient condition declining or worsening    Shift Goals  Clinical Goals: Pain management  Patient Goals: Pain management    Progress made toward(s) clinical / shift goals:  Pain managed with rest this shift    Problem: Pain - Standard  Goal: Alleviation of pain or a reduction in pain to the patient’s comfort goal  Outcome: Progressing     Problem: Skin Integrity  Goal: Skin integrity is maintained or improved  Outcome: Progressing       Patient is not progressing towards the following goals:

## 2021-12-30 NOTE — PROGRESS NOTES
Hospital Medicine Daily Progress Note    Date of Service  12/30/2021    Chief Complaint  Diana Tristan is a 88 y.o. female admitted 12/28/2021 with fall    Hospital Course  History of hypertension, COPD which is nonoxygen dependent admitted after ground-level fall and inability to ambulate.  When yaniv arrived at her house they found her to be hypoxic.  On admission she was found to have hypertensive urgency, acute respiratory failure with hypoxia requiring 2 L, chest x-ray consistent with community-acquired pneumonia.  CT imaging revealed L1 compression fracture as well as right pubic ramus fracture.    Interval Problem Update  12/29: Urine retention 650 straight cath x1.  Unable to ambulate, requiring [place] for pain control.  Requiring 2 L, tolerating antibiotics.  BP uncontrolled in the 190s to 200s, amlodipine added  12/30: Pain is controlled, has not ambulated yet.  Weaned to room air, de-escalated to p.o. Omnicef    I have personally seen and examined the patient at bedside. I discussed the plan of care with patient and bedside RN.    Consultants/Specialty  orthopedics Dr. Bae    Code Status  DNAR, I OK    Disposition  Patient is not medically cleared for discharge.   Anticipate discharge to to skilled nursing facility.  Versus home with home health  I have placed the appropriate orders for post-discharge needs.    Review of Systems  Review of Systems   Constitutional: Negative for chills, fever and malaise/fatigue.   HENT: Negative for sore throat.    Respiratory: Negative for cough and shortness of breath.    Cardiovascular: Negative for chest pain and palpitations.   Gastrointestinal: Negative for abdominal pain, blood in stool, diarrhea, heartburn, nausea and vomiting.   Genitourinary: Negative for dysuria and frequency.   Musculoskeletal: Positive for back pain, falls and myalgias.   Neurological: Negative for dizziness, focal weakness, weakness and headaches.   Psychiatric/Behavioral: Negative  for depression and hallucinations.   All other systems reviewed and are negative.       Physical Exam  Temp:  [36.7 °C (98 °F)-37.4 °C (99.3 °F)] 37.4 °C (99.3 °F)  Pulse:  [75-98] 93  Resp:  [16-18] 16  BP: (108-196)/(50-82) 125/53  SpO2:  [90 %-98 %] 90 %    Physical Exam  Constitutional:       General: She is not in acute distress.     Appearance: She is cachectic.   HENT:      Nose: Nose normal.      Mouth/Throat:      Mouth: Mucous membranes are dry.   Cardiovascular:      Rate and Rhythm: Normal rate and regular rhythm.      Pulses: Normal pulses.   Pulmonary:      Effort: Pulmonary effort is normal. No respiratory distress.      Breath sounds: Normal breath sounds. No wheezing.   Abdominal:      General: There is no distension.      Palpations: Abdomen is soft.   Musculoskeletal:         General: Signs of injury present. No swelling.      Cervical back: No muscular tenderness.      Comments: Decreased ROM bilateral lower extremities   Lymphadenopathy:      Cervical: No cervical adenopathy.   Skin:     General: Skin is warm and dry.      Findings: No rash.   Neurological:      General: No focal deficit present.      Mental Status: She is alert and oriented to person, place, and time.      Sensory: No sensory deficit.      Motor: Weakness present.      Coordination: Coordination normal.      Deep Tendon Reflexes: Reflexes normal.   Psychiatric:         Mood and Affect: Mood normal.         Thought Content: Thought content normal.         Fluids    Intake/Output Summary (Last 24 hours) at 12/30/2021 1243  Last data filed at 12/30/2021 0800  Gross per 24 hour   Intake 600 ml   Output 650 ml   Net -50 ml       Laboratory  Recent Labs     12/28/21  2335 12/29/21  0534 12/30/21  0512   WBC 8.3 7.8 7.1   RBC 5.17 5.12 5.22   HEMOGLOBIN 16.1* 15.9 16.1*   HEMATOCRIT 47.8* 47.8* 49.6*   MCV 92.5 93.4 95.0   MCH 31.1 31.1 30.8   MCHC 33.7 33.3* 32.5*   RDW 45.6 45.3 48.6   PLATELETCT 164 146* 151*   MPV 10.3 9.5 10.3      Recent Labs     12/28/21  2335 12/30/21  0512   SODIUM 135 137   POTASSIUM 3.7 4.4   CHLORIDE 98 100   CO2 28 26   GLUCOSE 126* 105*   BUN 17 22   CREATININE 0.73 0.84   CALCIUM 9.8 8.9                   Imaging  DX-PELVIS-TRAUMA SERIES  3-   Final Result         1. Redemonstration of acute comminuted mildly displaced fractures of the right ischial tuberosity and right inferior pubic ramus.      CT-LSPINE W/O PLUS RECONS   Final Result      1.  Acute compression deformity of L1 extending into the posterior cortex. Trace retropulsion of the superior aspect.   2.  Multilevel degenerative changes.   3.  Partially visualized bibasilar fibrotic changes.   4.  Small hiatal hernia.      CT-PELVIS W/O PLUS RECONS   Final Result         1.  Comminuted fracture of the right ischial tuberosity.   2.  Mildly comminuted and displaced right inferior pubic ramus fracture. Small adjacent hematoma.   3.  Evaluation for additional periprosthetic fracture is suboptimal due to streak artifact and osteopenia.   4.  Atherosclerotic changes.      DX-CHEST-PORTABLE (1 VIEW)   Final Result      1.  Left perihilar airspace opacity concerning for pneumonia.           Assessment/Plan  * Acute respiratory failure with hypoxia (HCC)- (present on admission)  Assessment & Plan  Secondary to pneumonia-was able to wean to room air today  Incentive spirometry, pulmonary hygiene  DuoNebs as needed.      Advance care planning  Assessment & Plan  Long CODE STATUS discussion  She is okay with intubation but no CPR    Urinary retention  Assessment & Plan  Likely secondary to immobility, narcotics  Straight cath x1, not urinating okay  Continue to monitor    Severe protein-calorie malnutrition (HCC)  Assessment & Plan  Dietary consult, nutritional supplements    Closed compression fracture of body of L1 vertebra (HCC)- (present on admission)  Assessment & Plan  Mild low back pain.  PT OT pending  Norco/Tylenol PRN    Multiple fractures of pelvis (HCC)-  (present on admission)  Assessment & Plan  Comminuted fracture of right ischial tuberosity as well as mild comminuted displaced right inferior pubic ramus fracture.  I discussed the case with orthopedic surgery, Dr. Bae  Non-operative management  WBAT  Pain control w PRN tylenol and norco if needed  PT OT to evaluate to determine HH vs SNF    Pneumonia of left upper lobe due to infectious organism- (present on admission)  Assessment & Plan  De-escalate to Omnicef, azithromycin 5 days total  Blood cultures negative  Procalcitonin negative  Incentive spirometry.      Hypertensive urgency- (present on admission)  Assessment & Plan  BP elevated on admission, improved with the addition of amlodipine  Continue amlodipine 10  Continue benazepril 40  IV antihypertensives, hydralazine with parameters    COPD (chronic obstructive pulmonary disease) (HCC)- (present on admission)  Assessment & Plan  Not in acute exacerbation.  Breathing treatments as needed  Add inhaled corticosteroid    Hyperlipidemia- (present on admission)  Assessment & Plan  Continue lovastatin       VTE prophylaxis: enoxaparin ppx    I have performed a physical exam and reviewed and updated ROS and Plan today (12/30/2021). In review of yesterday's note (12/29/2021), there are no changes except as documented above.

## 2021-12-30 NOTE — DIETARY
"Nutrition services: Day 1 of admit.  Diana Tristan is a 88 y.o. female with admitting DX of acute respiratory failure with hypoxia.    Consult received for supplements, BMI<19, severe protein calorie malnutrition on problem list.    Pt seen at bedside, reports eating 3 small meals per day and does not snack. Pt states usual weight is  lbs but has been having weight loss in last 3 years consistently. Pt had Boost VHC at bedside, dislikes but is agreeable to strawberry flavor shakes.     Assessment:  Height: 167.6 cm (5' 6\")  Weight: 43 kg (94 lb 12.8 oz) - bed scale  Body mass index is 15.3 kg/m²., BMI classification: underweight  Diet/Intake: Regular with Boost VHC/Plus at meals; % of breakfast today    Evaluation:   1. PMH of HTN and UTI. Pt admitted s/p GLF. No plans for surgery.  2. Pt reports eating 3 small meals per day for past few months (<50%).   3. Per weight hx, last weight is 99 lbs on 6/4/20, which is 5 lb weight loss in 1 1/2 years, not significant but worth noting.  4. Nutrition focused physical exam done showing severe muscle and severe fat wasting of the clavicals, triceps, scapula, temples, dorsal hand.  5. Pt is on 1.5 L/min oxygen via NC.  6. Skin: No wounds or edema noted.  7. Labs: glucose 105, Last A1C 5.9 on 7/28/21  8. Meds: colace, bowel protocol, zofran prn  9. Last BM: 12/28    Malnutrition Risk: Severe malnutrition in the context of starvation-related as evidenced by severe muscle and severe fat wasting, and eating <50% for >1 month.    Recommendations/Plan:  1. Regular diet as tolerated. Will change Boost to Plus with meals per pt preference. Discussed importance of adequate kcal and protein intake.  2. Encourage intake of meals and supplements.  3. Document intake of all meals and supplements as % taken in ADL's to provide interdisciplinary communication across all shifts.   4. Monitor weight.  5. Nutrition rep will continue to see patient for ongoing meal and snack " preferences.     RD following.

## 2021-12-30 NOTE — CARE PLAN
Problem: Hyperinflation  Goal: Prevent or improve atelectasis  Description: Target End Date:  3 to 4 days    1. Instruct incentive spirometry usage  2.  Perform hyperinflation therapy as indicated  Outcome: Progressing

## 2021-12-30 NOTE — RESPIRATORY CARE
COPD EDUCATION by COPD CLINICAL EDUCATOR  12/30/2021 at 3:57 PM by Karmen No RRT     Patient reviewed by COPD education team. Patient does not have a history or diagnosis of COPD, unable to locate PFT and pt has never used oxygen or inhalers and is a former smoker but only for a year.  Therefore, patient is not interested in the COPD program. Did provided a Short intervention completed with this patient covering: What is COPD (how the lungs work), daily medications rescue and maintenance, breathing techniques, infection prevention and oxygen safety were covered in detail.  A comprehensive packet including information about COPD, treatments, and oxygen safety was given.           COPD Screen  COPD Risk Screening  Do you have a history of COPD?: Yes  Do you have a Pulmonologist?: No (May have seen one 10 yrs ago does not recall)  COPD Population Screener  During the past 4 weeks, how much did you feel short of breath?: None/Little of the time  Do you ever cough up any mucus or phlegm?: No/only with occasional colds or infections  In the past 12 months, you do less than you used to because of your breathing problems: Disagree/unsure  Have you smoked at least 100 cigarettes in your entire life?: Yes  How old are you?: 60+  COPD Screening Score: 4    COPD Assessment  COPD Clinical Specialists ONLY  COPD Education Initiated: Yes--Short Intervention (Pt given the label COPD on 5/23/2018, has never taken any respiratory medications, does not recall every having a PFT, and smoked a very long time ago but only for a year (social [place]) pt was ordered Symbicort and refused to take it)  Pulmonologist Name: None, not interested at this time has done well until now was brought in due to a fall, PNA seen on xray and oxygen required now, pt is not interested in starting to take respiratory medicaitons as has been great up to this fall  Referrals Initiated:  (Pt declined, has not been decided if SNF or Home Health)  Is  "this a COPD exacerbation patient?: No  (OP) Pulmonary Function Testing:  (Encourage pt to talk to PCP and determine if PFT would be beneficial and determine lung function)    Meds to Beds        MY COPD ACTION PLAN     It is recommended that patients and physicians /healthcare providers complete this action plan together. This plan should be discussed at each physician visit and updated as needed.    The green, yellow and red zones show groups of symptoms of COPD. This list of symptoms is not comprehensive, and you may experience other symptoms. In the \"Actions\" column, your healthcare provider has recommended actions for you to take based on your symptoms.    Patient Name: Diana Tristan   YOB: 1933   Last Updated on:     Green Zone:  I am doing well today Actions   •  Usual activitiy and exercise level •  Take daily medications   •  Usual amounts of cough and phlegm/mucus •  Use oxygen as prescribed   •  Sleep well at night •  Continue regular exercise/diet plan   •  Appetite is good •  At all times avoid cigarette smoke, inhaled irritants     Daily Medications (these medications are taken every day):                Yellow Zone:  I am having a bad day or a COPD flare Actions   •  More breathless than usual •  Continue daily medications   •  I have less energy for my daily activities •  Use quick relief inhaler as ordered   •  Increased or thicker phlegm/mucus •  Use oxygen as prescribed   •  Using quick relief inhaler/nebulizer more often •  Get plenty of rest   •  Swelling of ankles more than usual •  Use pursed lip breathing   •  More coughing than usual •  At all times avoid cigarette smoke, inhaled irritants   •  I feel like I have a \"chest cold\"   •  Poor sleep and my symptoms woke me up   •  My appetite is not good   •  My medicine is not helping    •  Call provider immediately if symptoms don’t improve     Continue daily medications, add rescue medications:               Medications to be " used during a flare up, (as Discussed with Provider):              Red Zone:  I need urgent medical care Actions   •  Severe shortness of breath even at rest •  Call 911 or seek medical care immediately   •  Not able to do any activity because of breathing    •  Fever or shaking chills    •  Feeling confused or very drowsy     •  Chest pains    •  Coughing up blood

## 2021-12-30 NOTE — FLOWSHEET NOTE
12/29/21 1945   Events/Summary/Plan   Events/Summary/Plan IS/PEP   Vital Signs   Pulse 90   Respiration 18   Pulse Oximetry 94 %   $ Pulse Oximetry (Spot Check) Yes   Respiratory Assessment   Respiratory Pattern Within Normal Limits   Level of Consciousness Alert   Chest Exam   Work Of Breathing / Effort Mild   Breath Sounds   RUL Breath Sounds Clear   RML Breath Sounds Diminished   RLL Breath Sounds Diminished   SHANTELLE Breath Sounds Clear;Diminished   LLL Breath Sounds Fine Crackles   Secretions   Cough Non Productive   Oxygen   O2 (LPM) 2   O2 Delivery Device Nasal Cannula

## 2021-12-30 NOTE — THERAPY
Physical Therapy   Initial Evaluation     Patient Name: Diana Tristan  Age:  88 y.o., Sex:  female  Medical Record #: [id number]  Today's Date: 12/30/2021     Precautions  Precautions: (P) Fall Risk;Weight Bearing As Tolerated Right Lower Extremity;Weight Bearing As Tolerated Left Lower Extremity    Assessment  Patient is 88 y.o. female who was recently admitted for GLF, hypoxia, and PNA. Pt also presented with compression fracture of L1 and multiple fractures of the pelvis. Pt is to be treated non-operatively and is WBAT for B LE. Pt was primarily limited by pain, poor balance, weakness, impaired gait, and poor upright activity tolerance. Pt was able to demonstrate Min A for all functional mobility and was able to ambulate to the toilet with FWW use. Pt will continue to benefit from skilled PT while in house, with recommendation for post acute therapy prior to d/c home. Pt may be a good candidate for post acute rehab as pt is anticipated to progress quickly back to her IPLOF. Will continue to follow and update plan of care. Recommend PM&R consult.     Plan    Recommend Physical Therapy 4 times per week until therapy goals are met for the following treatments:  Bed Mobility, Community Re-integration, Equipment, Gait Training, Manual Therapy, Neuro Re-Education / Balance, Self Care/Home Evaluation, Stair Training, Therapeutic Activities and Therapeutic Exercises    DC Equipment Recommendations: (P) Unable to determine at this time  Discharge Recommendations: (P) Recommend post-acute placement for additional physical therapy services prior to discharge home( recommend PM&R consult)     Objective       12/30/21 0877   Initial Contact Note    Initial Contact Note Order Received and Verified, Physical Therapy Evaluation in Progress with Full Report to Follow.   Precautions   Precautions Fall Risk;Weight Bearing As Tolerated Right Lower Extremity;Weight Bearing As Tolerated Left Lower Extremity   Vitals   O2 (LPM) 2    O2 Delivery Device Silicone Nasal Cannula   Pain 0 - 10 Group   Location Hip;Back   Location Orientation Right   Description Aching   Therapist Pain Assessment Prior to Activity;During Activity;Post Activity;Nurse Notified  (c/o moderate pain; not rated)   Prior Living Situation   Prior Services None   Housing / Facility 1 Story House   Steps Into Home 2   Steps In Home 0   Equipment Owned Front-Wheel Walker;Single Point Cane   Lives with - Patient's Self Care Capacity Spouse   Comments spouse is unable to assist physically    Prior Level of Functional Mobility   Bed Mobility Independent   Transfer Status Independent   Ambulation Independent   Distance Ambulation (Feet)   (community )   Assistive Devices Used Single Point Cane  (only outside)   Stairs Independent   Comments pt reports of an IPLOF    History of Falls   History of Falls Yes   Date of Last Fall   (reports of a fall in november and current fall)   Cognition    Cognition / Consciousness WDL   Level of Consciousness Alert   Comments pleasant/cooperative; increased processing time and slightly delayed at times    Passive ROM Lower Body   Passive ROM Lower Body X   Comments limited in B LE due to pain; able to demo functional PROM    Active ROM Lower Body    Active ROM Lower Body  X   Comments same as above   Strength Lower Body   Lower Body Strength  X   Comments limited due to pain; able to demo functional strength for B LE; presents with gross strength of 4-/5 in B LE    Sensation Lower Body   Lower Extremity Sensation   WDL   Lower Body Muscle Tone   Lower Body Muscle Tone  WDL   Strength Upper Body   Upper Body Strength  WDL   Upper Body Muscle Tone   Upper Body Muscle Tone  WDL   Neurological Concerns   Neurological Concerns No   Coordination Lower Body    Coordination Lower Body  WDL   Balance Assessment   Sitting Balance (Static) Fair   Sitting Balance (Dynamic) Fair -   Standing Balance (Static) Fair -   Standing Balance (Dynamic) Fair -   Weight  Shift Sitting Fair   Weight Shift Standing Fair   Comments w/fww; heavy reliance on FWW due to pain    Gait Analysis   Gait Level Of Assist Minimal Assist   Assistive Device Front Wheel Walker   Distance (Feet) 30   # of Times Distance was Traveled 1   Deviation Antalgic;Step To;Shuffled Gait   Weight Bearing Status WBAT B LE    Comments cues for upright posture; unable to stand upright due to pain    Bed Mobility    Supine to Sit Minimal Assist   Scooting Contact Guard Assist   Comments HOB elevated and rails up; cues for logrolling    Functional Mobility   Sit to Stand Minimal Assist   Bed, Chair, Wheelchair Transfer Minimal Assist   Toilet Transfers Minimal Assist   Transfer Method Stand Step   Mobility EOB, sit<>stand, ambulation, to toilet, transfer to chair    Comments cues for handplacement and FWW use during transfers    How much difficulty does the patient currently have...   Turning over in bed (including adjusting bedclothes, sheets and blankets)? 3   Sitting down on and standing up from a chair with arms (e.g., wheelchair, bedside commode, etc.) 3   Moving from lying on back to sitting on the side of the bed? 3   How much help from another person does the patient currently need...   Moving to and from a bed to a chair (including a wheelchair)? 3   Need to walk in a hospital room? 3   Climbing 3-5 steps with a railing? 1   6 clicks Mobility Score 16   Activity Tolerance   Sitting in Chair 10 mins   Sitting Edge of Bed 5 mins   Standing 4 mins   Comments limited due to pain and nausea    Edema / Skin Assessment   Edema / Skin  Not Assessed   Patient / Family Goals    Patient / Family Goal #1 to go back to PLOF    Short Term Goals    Short Term Goal # 1 pt will go supine<>sit w/hob flat and rails down w/spv in 6tx    Short Term Goal # 2 pt will go sit<>stand w/fww w/spv in 6tx    Short Term Goal # 3 pt will transfer bed<>chair w/fww w/spv in 6tx    Short Term Goal # 4 pt will ambulate for 150ft w/fww w/spv  in 6tx    Short Term Goal # 5 pt will go up/down 2 steps w/spv in 6tx    Education Group   Education Provided Role of Physical Therapist;Spine Precautions;Weight Bearing Status;Weight Bearing Precautions   Spine Precautions Patient Response Patient;Acceptance;Explanation;Demonstration;Verbal Demonstration;Action Demonstration   Role of Physical Therapist Patient Response Patient;Acceptance;Explanation;Demonstration;Verbal Demonstration;Action Demonstration   Weight Bearing Status Patient Response Patient;Acceptance;Demonstration;Explanation;Verbal Demonstration;Action Demonstration   Problem List    Problems Pain;Impaired Bed Mobility;Impaired Transfers;Impaired Ambulation;Functional Strength Deficit;Impaired Balance;Decreased Activity Tolerance   Anticipated Discharge Equipment and Recommendations   DC Equipment Recommendations Unable to determine at this time   Discharge Recommendations Recommend post-acute placement for additional physical therapy services prior to discharge home   Interdisciplinary Plan of Care Collaboration   IDT Collaboration with  Nursing;Occupational Therapist   Patient Position at End of Therapy In Bed;Call Light within Reach;Phone within Reach;Tray Table within Reach   Collaboration Comments aware of visit and recs    Session Information   Date / Session Number  12/30-1 (1/4, 1/5)

## 2021-12-31 PROCEDURE — 770006 HCHG ROOM/CARE - MED/SURG/GYN SEMI*

## 2021-12-31 PROCEDURE — A9270 NON-COVERED ITEM OR SERVICE: HCPCS | Performed by: STUDENT IN AN ORGANIZED HEALTH CARE EDUCATION/TRAINING PROGRAM

## 2021-12-31 PROCEDURE — 94760 N-INVAS EAR/PLS OXIMETRY 1: CPT

## 2021-12-31 PROCEDURE — 700102 HCHG RX REV CODE 250 W/ 637 OVERRIDE(OP): Performed by: INTERNAL MEDICINE

## 2021-12-31 PROCEDURE — 94669 MECHANICAL CHEST WALL OSCILL: CPT

## 2021-12-31 PROCEDURE — 700102 HCHG RX REV CODE 250 W/ 637 OVERRIDE(OP): Performed by: STUDENT IN AN ORGANIZED HEALTH CARE EDUCATION/TRAINING PROGRAM

## 2021-12-31 PROCEDURE — 700111 HCHG RX REV CODE 636 W/ 250 OVERRIDE (IP): Performed by: INTERNAL MEDICINE

## 2021-12-31 PROCEDURE — A9270 NON-COVERED ITEM OR SERVICE: HCPCS | Performed by: INTERNAL MEDICINE

## 2021-12-31 PROCEDURE — 99233 SBSQ HOSP IP/OBS HIGH 50: CPT | Performed by: INTERNAL MEDICINE

## 2021-12-31 RX ADMIN — CEFDINIR 300 MG: 300 CAPSULE ORAL at 17:09

## 2021-12-31 RX ADMIN — BENAZEPRIL HYDROCHLORIDE 40 MG: 10 TABLET ORAL at 05:55

## 2021-12-31 RX ADMIN — CEFDINIR 300 MG: 300 CAPSULE ORAL at 05:55

## 2021-12-31 RX ADMIN — AZITHROMYCIN MONOHYDRATE 500 MG: 250 TABLET ORAL at 05:55

## 2021-12-31 RX ADMIN — HYDROCODONE BITARTRATE AND ACETAMINOPHEN 1 TABLET: 5; 325 TABLET ORAL at 00:06

## 2021-12-31 RX ADMIN — AMLODIPINE BESYLATE 10 MG: 5 TABLET ORAL at 05:55

## 2021-12-31 RX ADMIN — HYDROCODONE BITARTRATE AND ACETAMINOPHEN 1 TABLET: 5; 325 TABLET ORAL at 13:28

## 2021-12-31 RX ADMIN — ENOXAPARIN SODIUM 30 MG: 30 INJECTION SUBCUTANEOUS at 05:56

## 2021-12-31 RX ADMIN — LOVASTATIN 10 MG: 20 TABLET ORAL at 22:21

## 2021-12-31 RX ADMIN — HYDROCODONE BITARTRATE AND ACETAMINOPHEN 1 TABLET: 5; 325 TABLET ORAL at 22:21

## 2021-12-31 ASSESSMENT — ENCOUNTER SYMPTOMS
DIARRHEA: 0
WEAKNESS: 1
PALPITATIONS: 0
HEARTBURN: 0
ABDOMINAL PAIN: 0
FOCAL WEAKNESS: 0
BLOOD IN STOOL: 0
VOMITING: 0
FEVER: 0
SORE THROAT: 0
HALLUCINATIONS: 0
DIZZINESS: 0
BACK PAIN: 1
MYALGIAS: 1
HEADACHES: 0
COUGH: 0
CHILLS: 0
SHORTNESS OF BREATH: 0
FALLS: 0
DEPRESSION: 0
NAUSEA: 0

## 2021-12-31 ASSESSMENT — PAIN DESCRIPTION - PAIN TYPE
TYPE: ACUTE PAIN

## 2021-12-31 NOTE — CONSULTS
DATE:  12/30/2021    REQUESTING PHYSICIAN: Rita Adamson DO    CHIEF COMPLAINT:  Pelvic pain    HISTORY OF PRESENT ILLNESS:  Mrs. Tristan is an 88 year old woman who had a ground-level fall on Tuesday night. She had pain, particularly with weight-bearing, with difficulty ambulating. Imaging in the emergency room showed pelvis fractures and orthopaedic consultation was requested.    ALLERGIES: Augmentin, sulfa, diclofenac, codeine    MEDICATIONS:  Vitamin D, benazepril, lovastatin    PAST MEDICAL HISTORY:  COPD, hyperlipidemia, hypertension    PAST SURGICAL HISTORY: Right hip hemiarthroplasty    SOCIAL HISTORY:  She does not smoke, drink, or use drugs.    FAMILY HISTORY:  Negative    REVIEW OF SYSTEMS:  No headaches, nausea, vomiting, diarrhea, constipation, polyuria, dysuria, fevers, chills, weight loss, weight gain, abdominal pain, chest pain, shortness of breath.    EXAMINATION:    General:  No acute distress sitting in chair  Vitals: Blood pressure 134/53, heart rate 86, respirations 16, temperature 99.4  HEENT: Normocephalic, atraumatic  Neck: Supple, nontender  Chest: Nontender  Lungs: No labored breathing  Heart: Regular  Abdomen: Soft  Pelvis: Stable  Extremities: No deformities of the upper or lower extremities  Neurological: She is able to dorsiflex and plantarflex both ankles    LAB: White blood cell count is 7100, hematocrit 49.6%, platelet count 151,000.  Sodium 137, potassium 4.4, creatinine 0.84, albumin 3.6.    IMAGING: CT scan of the pelvis shows right inferior pubic ramus fracture.  There is no obvious posterior pelvic ring injury.    Inlet and outlet radiographs of the pelvis show maintained pelvic symmetry.  The right inferior ramus fracture is noted.  There is also a right hip hemiarthroplasty.    ASSESSMENT: Stable pelvic ring injury    PLAN: I recommended nonoperative treatment.  Pelvis is stable clinically and radiographs would also suggest this.  She can be weightbearing as tolerated with a  walker.  We will see her back in the clinic in approximately 10-14 days for repeat radiographs.

## 2021-12-31 NOTE — DISCHARGE PLANNING
Anticipated Discharge Disposition: SNF vs Rehab    Action: Discussed pt in morning rounds. Per MD, pt is medically cleared for SNF IRF.    LSW met with pt at bedside to collect SNF/rehab choice. Pt gave verbal consent to send SNF referral to [facility] and rehab referral to [facility]. LSW notified pt that if [facility] declines we would send more referrals. Pt expressed understanding.     LSW faxed SNF and rehab choice forms to [facility].    Barriers to Discharge: SNF/rehab acceptance    Plan: LSW to follow up with SNF referrals.

## 2021-12-31 NOTE — CARE PLAN
Patient will be followed QID with IS by respiratory care and encouraged to use Q1 w/a. Will do PEP therapy QID. to attempt a increase in IS volumes Will continue Aerobika flutter valve use QID with therapy and encourage Cough and deep breathing.

## 2021-12-31 NOTE — PROGRESS NOTES
Hospital Medicine Daily Progress Note    Date of Service  12/31/2021    Chief Complaint  Diana Tristan is a 88 y.o. female admitted 12/28/2021 with fall    Hospital Course  History of hypertension, COPD which is nonoxygen dependent admitted after ground-level fall and inability to ambulate.  When [facility] arrived at her house they found her to be hypoxic.  On admission she was found to have hypertensive urgency, acute respiratory failure with hypoxia requiring 2 L, chest x-ray consistent with community-acquired pneumonia.  CT imaging revealed L1 compression fracture as well as right pubic ramus fracture.    Interval Problem Update  12/29: Urine retention 650 straight cath x1.  Unable to ambulate, requiring [place] for pain control.  Requiring 2 L, tolerating antibiotics.  BP uncontrolled in the 190s to 200s, amlodipine added  12/30: Pain is controlled, has not ambulated yet.  Weaned to room air, de-escalated to p.o. Omnicef  12/31: PT OT recommended SNF, referral placed and acceptance is pending. She is on 0 to 2 L    I have personally seen and examined the patient at bedside. I discussed the plan of care with patient and bedside RN.    Consultants/Specialty  orthopedics Dr. Bae    Code Status  DNAR, I OK    Disposition  Patient is not medically cleared for discharge.   Anticipate discharge to to skilled nursing facility.  Versus home with home health  I have placed the appropriate orders for post-discharge needs.    Review of Systems  Review of Systems   Constitutional: Negative for chills, fever and malaise/fatigue.   HENT: Negative for sore throat.    Respiratory: Negative for cough and shortness of breath.    Cardiovascular: Negative for chest pain and palpitations.   Gastrointestinal: Negative for abdominal pain, blood in stool, diarrhea, heartburn, nausea and vomiting.   Genitourinary: Negative for dysuria and frequency.   Musculoskeletal: Positive for back pain and myalgias. Negative for falls.    Neurological: Positive for weakness. Negative for dizziness, focal weakness and headaches.   Psychiatric/Behavioral: Negative for depression and hallucinations.   All other systems reviewed and are negative.       Physical Exam  Temp:  [36.4 °C (97.6 °F)-37.4 °C (99.4 °F)] 36.4 °C (97.6 °F)  Pulse:  [73-89] 79  Resp:  [16-17] 17  BP: (134-143)/(53-61) 136/55  SpO2:  [92 %-96 %] 95 %    Physical Exam  Constitutional:       General: She is not in acute distress.     Appearance: She is cachectic.   HENT:      Nose: Nose normal.      Mouth/Throat:      Mouth: Mucous membranes are dry.   Cardiovascular:      Rate and Rhythm: Normal rate and regular rhythm.      Pulses: Normal pulses.   Pulmonary:      Effort: Pulmonary effort is normal. No respiratory distress.      Breath sounds: Normal breath sounds. No wheezing.   Abdominal:      General: There is no distension.      Palpations: Abdomen is soft.   Musculoskeletal:         General: Signs of injury present. No swelling.      Cervical back: No muscular tenderness.      Comments: Decreased ROM bilateral lower extremities   Lymphadenopathy:      Cervical: No cervical adenopathy.   Skin:     General: Skin is warm and dry.      Findings: No rash.   Neurological:      General: No focal deficit present.      Mental Status: She is alert and oriented to person, place, and time.      Sensory: No sensory deficit.      Motor: Weakness present.      Coordination: Coordination normal.      Deep Tendon Reflexes: Reflexes normal.   Psychiatric:         Mood and Affect: Mood normal.         Thought Content: Thought content normal.         Fluids    Intake/Output Summary (Last 24 hours) at 12/31/2021 1427  Last data filed at 12/31/2021 1200  Gross per 24 hour   Intake 720 ml   Output --   Net 720 ml       Laboratory  Recent Labs     12/28/21  2335 12/29/21  0534 12/30/21  0512   WBC 8.3 7.8 7.1   RBC 5.17 5.12 5.22   HEMOGLOBIN 16.1* 15.9 16.1*   HEMATOCRIT 47.8* 47.8* 49.6*   MCV 92.5  93.4 95.0   MCH 31.1 31.1 30.8   MCHC 33.7 33.3* 32.5*   RDW 45.6 45.3 48.6   PLATELETCT 164 146* 151*   MPV 10.3 9.5 10.3     Recent Labs     12/28/21  2335 12/30/21  0512   SODIUM 135 137   POTASSIUM 3.7 4.4   CHLORIDE 98 100   CO2 28 26   GLUCOSE 126* 105*   BUN 17 22   CREATININE 0.73 0.84   CALCIUM 9.8 8.9                   Imaging  DX-PELVIS-TRAUMA SERIES  3-   Final Result         1. Redemonstration of acute comminuted mildly displaced fractures of the right ischial tuberosity and right inferior pubic ramus.      CT-LSPINE W/O PLUS RECONS   Final Result      1.  Acute compression deformity of L1 extending into the posterior cortex. Trace retropulsion of the superior aspect.   2.  Multilevel degenerative changes.   3.  Partially visualized bibasilar fibrotic changes.   4.  Small hiatal hernia.      CT-PELVIS W/O PLUS RECONS   Final Result         1.  Comminuted fracture of the right ischial tuberosity.   2.  Mildly comminuted and displaced right inferior pubic ramus fracture. Small adjacent hematoma.   3.  Evaluation for additional periprosthetic fracture is suboptimal due to streak artifact and osteopenia.   4.  Atherosclerotic changes.      DX-CHEST-PORTABLE (1 VIEW)   Final Result      1.  Left perihilar airspace opacity concerning for pneumonia.           Assessment/Plan  * Acute respiratory failure with hypoxia (HCC)- (present on admission)  Assessment & Plan  Secondary to pneumonia-still needing between 0 and 2 L  Incentive spirometry, pulmonary hygiene  DuoNebs as needed.      Advance care planning  Assessment & Plan  Long CODE STATUS discussion  She is okay with intubation but no CPR    Urinary retention  Assessment & Plan  Likely secondary to immobility, narcotics  Straight cath x1, not urinating okay  Continue to monitor    Severe protein-calorie malnutrition (HCC)  Assessment & Plan  Dietary consult, nutritional supplements    Closed compression fracture of body of L1 vertebra (HCC)- (present on  admission)  Assessment & Plan  Mild low back pain.  Norco/Tylenol PRN  Pending SNF    Multiple fractures of pelvis (HCC)- (present on admission)  Assessment & Plan  Comminuted fracture of right ischial tuberosity as well as mild comminuted displaced right inferior pubic ramus fracture.  I discussed the case with orthopedic surgery, Dr. Bae  Non-operative management  WBAT  Pain control w PRN tylenol and norco if needed  PT OT recommended SNF, placement pending    Pneumonia of left upper lobe due to infectious organism- (present on admission)  Assessment & Plan  De-escalate to Omnicef 5 days total, azithromycin 3 days total  Blood cultures negative  Procalcitonin negative  Incentive spirometry.      Hypertensive urgency- (present on admission)  Assessment & Plan  BP elevated on admission, improved with the addition of amlodipine  Continue amlodipine 10  Continue benazepril 40  IV antihypertensives, hydralazine with parameters    COPD (chronic obstructive pulmonary disease) (HCC)- (present on admission)  Assessment & Plan  Not in acute exacerbation.  Breathing treatments as needed  Added inhaled corticosteroid    Hyperlipidemia- (present on admission)  Assessment & Plan  Continue lovastatin       VTE prophylaxis: enoxaparin ppx    I have performed a physical exam and reviewed and updated ROS and Plan today (12/31/2021). In review of yesterday's note (12/30/2021), there are no changes except as documented above.

## 2021-12-31 NOTE — CARE PLAN
The patient is Stable - Low risk of patient condition declining or worsening    Shift Goals  Clinical Goals: pain mngt  Patient Goals: Pain management    Progress made toward(s) clinical / shift goals:  patient received 1 time pain medication prn for her lower back.Fall precaution observed,call bell within reach and bed in lowest position.

## 2021-12-31 NOTE — THERAPY
Occupational Therapy   Initial Evaluation     Patient Name: Diana Tristan  Age:  88 y.o., Sex:  female  Medical Record #: [id number]  Today's Date: 12/30/2021     Precautions  Precautions: (P) Weight Bearing As Tolerated Right Lower Extremity,Weight Bearing As Tolerated Left Lower Extremity,Fall Risk,Spinal / Back Precautions   Comments: (P) L1 compression fx    Assessment  Patient is 88 y.o. female admitted following GLF. L1 compression fracture, R pubic ramus fracture. A&Ox3,motivated for OOB. Shows impaired balance, activity tolerance for ADL's; see below for details. Fall risk at this time. Indep at home prior. Lives with spouse. OT will follow while in house.        Plan  Recommend Occupational Therapy 3 times per week until therapy goals are met for the following treatments:  Neuro Re-Education / Balance, Self Care/Activities of Daily Living and Therapeutic Activities.  DC Equipment Recmmendations: Unable to determine at this time  Discharge Recommendations: Recommend post-acute placement for additional occupational therapy services prior to discharge home      12/30/21 1522   Prior Living Situation   Prior Services None   Housing / Facility [facility]   Bathroom Set up Walk In Shower;Shower Chair   Equipment Owned Front-Wheel Walker;Single Point Cane;Tub / Shower Seat   Lives with - Patient's Self Care Capacity Spouse   Prior Level of ADL Function   Self Feeding Independent   Grooming / Hygiene Independent   Bathing Unable To Determine At This Time   Dressing Independent   Toileting Independent   Prior Level of IADL Function   Medication Management Independent   Laundry Unable To Determine At This Time   Kitchen Mobility Independent   Finances Unable To Determine At This Time   Home Management Independent   Shopping Unable To Determine At This Time   Prior Level Of Mobility Independent With Device in Home   Driving / Transportation Unable To Determine At This Time   Occupation (Pre-Hospital Vocational)  Retired Due To Age   Leisure Interests Unable To Determine At This Time   History of Falls   History of Falls Yes   Precautions   Precautions Weight Bearing As Tolerated Right Lower Extremity;Weight Bearing As Tolerated Left Lower Extremity;Fall Risk;Spinal / Back Precautions    Comments L1 compression fx   Vitals   O2 (LPM) 2   O2 Delivery Device Silicone Nasal Cannula   Pain 0 - 10 Group   Location Hip   Location Orientation Right   Therapist Pain Assessment Post Activity Pain Same as Prior to Activity;Nurse Notified   Cognition    Cognition / Consciousness WDL   Level of Consciousness Alert   Passive ROM Upper Body   Passive ROM Upper Body WDL   Active ROM Upper Body   Active ROM Upper Body  WDL   Strength Upper Body   Upper Body Strength  WDL   Sensation Upper Body   Upper Extremity Sensation  WDL   Upper Body Muscle Tone   Upper Body Muscle Tone  WDL   Coordination Upper Body   Coordination WDL   Balance Assessment   Sitting Balance (Static) Fair   Sitting Balance (Dynamic) Fair -   Standing Balance (Static) Fair -   Standing Balance (Dynamic) Fair -   Weight Shift Sitting Fair   Weight Shift Standing Fair   Comments fww   Bed Mobility    Supine to Sit Minimal Assist   Scooting Contact Guard Assist   ADL Assessment   Eating Supervision   Grooming Supervision;Seated   Lower Body Dressing Moderate Assist   Toileting Moderate Assist   How much help from another person does the patient currently need...   Putting on and taking off regular lower body clothing? 2   Bathing (including washing, rinsing, and drying)? 2   Toileting, which includes using a toilet, bedpan, or urinal? 2   Putting on and taking off regular upper body clothing? 3   Taking care of personal grooming such as brushing teeth? 4   Eating meals? 4   6 Clicks Daily Activity Score 17   Functional Mobility   Sit to Stand Minimal Assist   Bed, Chair, Wheelchair Transfer Minimal Assist   Toilet Transfers Minimal Assist   Transfer Method Stand Step    Visual Perception   Visual Perception  WDL   Activity Tolerance   Sitting in Chair >30  (on toilet and then UIC)   Sitting Edge of Bed 5   Standing 4   Short Term Goals   Short Term Goal # 1 pt will perform ADL transfers with Sup within 5 days     Short Term Goal # 2 pt will perform FB dressing withSup within 5days

## 2021-12-31 NOTE — DISCHARGE PLANNING
@1130  Agency[facility]Facility Name: [facility]  Outcome: No beds until Monday.    Received Choice form at 1015  Agency/Facility Name: [facility]  Referral sent per Choice form @ 1015     Received Choice form at 1015  Agency[facility]Facility Name: [facility]  Referral sent per Choice form @ 1015

## 2021-12-31 NOTE — DISCHARGE PLANNING
Anticipated Discharge Disposition:   SNF or IRF     Action:   Chart review complete.     Per MD, patient is medically cleared to discharge to SNF/IRF. MD to place SNF/IRF order and LSW to collect choice.     RN CM will continue to follow for acceptance and will communicate with IRF.     Order for PMR consult not placed yet. RN CM placed order per protocol.     Barriers to Discharge:   IRF/SNF acceptance     Plan:   Hospital care management will continue to assist with discharge planning needs.

## 2021-12-31 NOTE — PROGRESS NOTES
Received patient from Night shift RN . Patient is awake and alert.Comfortably eating her breakfast.No sign of distress. On O2 2 liters via NC.Patient denies any nausea or pain. . Fall precaution in placed, kept bed in lowest position and call light within reach.

## 2022-01-01 VITALS
TEMPERATURE: 97.9 F | SYSTOLIC BLOOD PRESSURE: 139 MMHG | BODY MASS INDEX: 15.24 KG/M2 | HEART RATE: 94 BPM | HEIGHT: 66 IN | WEIGHT: 94.8 LBS | OXYGEN SATURATION: 90 % | RESPIRATION RATE: 15 BRPM | DIASTOLIC BLOOD PRESSURE: 54 MMHG

## 2022-01-01 LAB
ALBUMIN SERPL BCP-MCNC: 3.4 G/DL (ref 3.2–4.9)
BASOPHILS # BLD AUTO: 0.4 % (ref 0–1.8)
BASOPHILS # BLD: 0.03 K/UL (ref 0–0.12)
BUN SERPL-MCNC: 22 MG/DL (ref 8–22)
CALCIUM SERPL-MCNC: 8.9 MG/DL (ref 8.4–10.2)
CHLORIDE SERPL-SCNC: 100 MMOL/L (ref 96–112)
CO2 SERPL-SCNC: 27 MMOL/L (ref 20–33)
CREAT SERPL-MCNC: 0.59 MG/DL (ref 0.5–1.4)
EOSINOPHIL # BLD AUTO: 0.15 K/UL (ref 0–0.51)
EOSINOPHIL NFR BLD: 2.1 % (ref 0–6.9)
ERYTHROCYTE [DISTWIDTH] IN BLOOD BY AUTOMATED COUNT: 47.9 FL (ref 35.9–50)
GLUCOSE SERPL-MCNC: 92 MG/DL (ref 65–99)
HCT VFR BLD AUTO: 48.8 % (ref 37–47)
HGB BLD-MCNC: 15.8 G/DL (ref 12–16)
IMM GRANULOCYTES # BLD AUTO: 0.01 K/UL (ref 0–0.11)
IMM GRANULOCYTES NFR BLD AUTO: 0.1 % (ref 0–0.9)
LYMPHOCYTES # BLD AUTO: 2.28 K/UL (ref 1–4.8)
LYMPHOCYTES NFR BLD: 32.5 % (ref 22–41)
MAGNESIUM SERPL-MCNC: 2.2 MG/DL (ref 1.5–2.5)
MCH RBC QN AUTO: 30.6 PG (ref 27–33)
MCHC RBC AUTO-ENTMCNC: 32.4 G/DL (ref 33.6–35)
MCV RBC AUTO: 94.4 FL (ref 81.4–97.8)
MONOCYTES # BLD AUTO: 1.01 K/UL (ref 0–0.85)
MONOCYTES NFR BLD AUTO: 14.4 % (ref 0–13.4)
NEUTROPHILS # BLD AUTO: 3.53 K/UL (ref 2–7.15)
NEUTROPHILS NFR BLD: 50.5 % (ref 44–72)
NRBC # BLD AUTO: 0 K/UL
NRBC BLD-RTO: 0 /100 WBC
PHOSPHATE SERPL-MCNC: 3.9 MG/DL (ref 2.5–4.5)
PLATELET # BLD AUTO: 186 K/UL (ref 164–446)
PMV BLD AUTO: 10.3 FL (ref 9–12.9)
POTASSIUM SERPL-SCNC: 4.4 MMOL/L (ref 3.6–5.5)
RBC # BLD AUTO: 5.17 M/UL (ref 4.2–5.4)
SODIUM SERPL-SCNC: 134 MMOL/L (ref 135–145)
WBC # BLD AUTO: 7 K/UL (ref 4.8–10.8)

## 2022-01-01 PROCEDURE — 94760 N-INVAS EAR/PLS OXIMETRY 1: CPT

## 2022-01-01 PROCEDURE — A9270 NON-COVERED ITEM OR SERVICE: HCPCS | Performed by: INTERNAL MEDICINE

## 2022-01-01 PROCEDURE — 36415 COLL VENOUS BLD VENIPUNCTURE: CPT

## 2022-01-01 PROCEDURE — 700102 HCHG RX REV CODE 250 W/ 637 OVERRIDE(OP): Performed by: STUDENT IN AN ORGANIZED HEALTH CARE EDUCATION/TRAINING PROGRAM

## 2022-01-01 PROCEDURE — 83735 ASSAY OF MAGNESIUM: CPT

## 2022-01-01 PROCEDURE — 51798 US URINE CAPACITY MEASURE: CPT

## 2022-01-01 PROCEDURE — 700111 HCHG RX REV CODE 636 W/ 250 OVERRIDE (IP): Performed by: INTERNAL MEDICINE

## 2022-01-01 PROCEDURE — 80069 RENAL FUNCTION PANEL: CPT

## 2022-01-01 PROCEDURE — 99239 HOSP IP/OBS DSCHRG MGMT >30: CPT | Performed by: INTERNAL MEDICINE

## 2022-01-01 PROCEDURE — 700102 HCHG RX REV CODE 250 W/ 637 OVERRIDE(OP): Performed by: INTERNAL MEDICINE

## 2022-01-01 PROCEDURE — 94669 MECHANICAL CHEST WALL OSCILL: CPT

## 2022-01-01 PROCEDURE — A9270 NON-COVERED ITEM OR SERVICE: HCPCS | Performed by: STUDENT IN AN ORGANIZED HEALTH CARE EDUCATION/TRAINING PROGRAM

## 2022-01-01 PROCEDURE — 85025 COMPLETE CBC W/AUTO DIFF WBC: CPT

## 2022-01-01 RX ORDER — BUDESONIDE AND FORMOTEROL FUMARATE DIHYDRATE 80; 4.5 UG/1; UG/1
2 AEROSOL RESPIRATORY (INHALATION) 2 TIMES DAILY
Status: ON HOLD
Start: 2022-01-01 | End: 2024-01-17

## 2022-01-01 RX ORDER — HYDROCODONE BITARTRATE AND ACETAMINOPHEN 5; 325 MG/1; MG/1
1-2 TABLET ORAL EVERY 6 HOURS PRN
Qty: 20 TABLET | Refills: 0 | Status: SHIPPED | OUTPATIENT
Start: 2022-01-01 | End: 2022-01-06

## 2022-01-01 RX ORDER — CEFDINIR 300 MG/1
300 CAPSULE ORAL EVERY 12 HOURS
Qty: 4 CAPSULE | Refills: 0 | Status: SHIPPED | OUTPATIENT
Start: 2022-01-01 | End: 2022-01-03

## 2022-01-01 RX ORDER — AMLODIPINE BESYLATE 10 MG/1
10 TABLET ORAL DAILY
Qty: 30 TABLET | Refills: 1 | Status: ON HOLD | OUTPATIENT
Start: 2022-01-02 | End: 2024-01-17

## 2022-01-01 RX ADMIN — AMLODIPINE BESYLATE 10 MG: 5 TABLET ORAL at 05:47

## 2022-01-01 RX ADMIN — BENAZEPRIL HYDROCHLORIDE 40 MG: 10 TABLET ORAL at 05:46

## 2022-01-01 RX ADMIN — SENNOSIDES AND DOCUSATE SODIUM 2 TABLET: 8.6; 5 TABLET ORAL at 05:47

## 2022-01-01 RX ADMIN — ENOXAPARIN SODIUM 30 MG: 30 INJECTION SUBCUTANEOUS at 05:46

## 2022-01-01 RX ADMIN — CEFDINIR 300 MG: 300 CAPSULE ORAL at 05:47

## 2022-01-01 ASSESSMENT — PAIN DESCRIPTION - PAIN TYPE: TYPE: ACUTE PAIN

## 2022-01-01 NOTE — PROGRESS NOTES
Patient is being discharged to [facility]. Report given to JANEY Lyn. Patient is A&Ox4. IV removed. Discharge instructions provided to patient and reviewed. Patient verbalized understanding and all questions and concerns were addressed. All belongings were packed and taken with. Patient escorted off unit by MADELINE.  is aware of transfer.

## 2022-01-01 NOTE — DISCHARGE PLANNING
PMR referral from Dr. Adamson. Per chart review plan to discharge to skilled nursing. No consult per protocol.

## 2022-01-01 NOTE — CARE PLAN
Problem: Knowledge Deficit - COPD  Goal: Patient/significant other demonstrates understanding of disease process, utilization of the Action Plan, medications and discharge instruction  Outcome: Progressing   Patient demonstrates good effort in using IS. Patient understands s/s of COPD exacerbation, when to call the doctor, and when to report to the ED.     Problem: Discharge Barriers/Planning  Goal: Patient's continuum of care needs are met  Outcome: Progressing  Medically cleared for discharge per Dr. Adamson.     The patient is Stable - Low risk of patient condition declining or worsening    Shift Goals  Clinical Goals: Will have bowel movement   Patient Goals: Discharge    Progress made toward(s) clinical / shift goals: Patient states she has a bowel movement 2 days ago. Patient has no c/o pain in her abdomen, abdomen is soft, non-tender, non-distended, and patient is passing gas. Chart updated accordingly. Patient is medically cleared for discharge and will be transported via [facility] to [facility] at 1100    Patient is not progressing towards the following goals: n/a

## 2022-01-01 NOTE — FLOWSHEET NOTE
01/01/22 0800   Vital Signs   Pulse 84   Respiration 16   Pulse Oximetry (!) 87 %   $ Pulse Oximetry (Spot Check) Yes   Respiratory Assessment   Level of Consciousness Alert   Breath Sounds   RUL Breath Sounds Clear   RML Breath Sounds Diminished   RLL Breath Sounds Diminished   SHANTELLE Breath Sounds Clear   LLL Breath Sounds Diminished   Secretions   Cough Dry;Non Productive   Oxygen   O2 (LPM) 0   O2 Delivery Device Room air w/o2 available

## 2022-01-01 NOTE — CARE PLAN
The patient is Stable - Low risk of patient condition declining or worsening    Shift Goals  Clinical Goals: Will have bowel movement   Patient Goals: Pain management and able to sleep     Progress made toward(s) clinical / shift goals:     Problem: Pain - Standard  Goal: Alleviation of pain or a reduction in pain to the patient’s comfort goal  Outcome: Progressing  Note: Prn Norco 5-325mg given per MAR for c/o lower back and generalized pain   Pt resting comfortably in bed   Will continue to monitor and medicate as needed      Problem: Discharge Barriers/Planning  Goal: Patient's continuum of care needs are met  Outcome: Progressing  Note: Pending SNF placement - referrals sent

## 2022-01-01 NOTE — DISCHARGE INSTRUCTIONS
Discharge Instructions    Discharged to other by ambulance with self. Discharged via ambulance, hospital escort: Yes.  Special equipment needed: Not Applicable    Be sure to schedule a follow-up appointment with your primary care doctor or any specialists as instructed.     Discharge Plan:   Diet Plan: Discussed  Activity Level: Discussed  Confirmed Follow up Appointment: Appointment Scheduled  Confirmed Symptoms Management: Discussed  Medication Reconciliation Updated: Yes  Influenza Vaccine Indication: Not indicated: Previously immunized this influenza season and > 8 years of age    I understand that a diet low in cholesterol, fat, and sodium is recommended for good health. Unless I have been given specific instructions below for another diet, I accept this instruction as my diet prescription.   Other diet: Regular    Special Instructions: None    · Is patient discharged on Warfarin / Coumadin?   No     Depression / Suicide Risk    As you are discharged from this [facility] facility, it is important to learn how to keep safe from harming yourself.    Recognize the warning signs:  · Abrupt changes in personality, positive or negative- including increase in energy   · Giving away possessions  · Change in eating patterns- significant weight changes-  positive or negative  · Change in sleeping patterns- unable to sleep or sleeping all the time   · Unwillingness or inability to communicate  · Depression  · Unusual sadness, discouragement and loneliness  · Talk of wanting to die  · Neglect of personal appearance   · Rebelliousness- reckless behavior  · Withdrawal from people/activities they love  · Confusion- inability to concentrate     If you or a loved one observes any of these behaviors or has concerns about self-harm, here's what you can do:  · Talk about it- your feelings and reasons for harming yourself  · Remove any means that you might use to hurt yourself (examples: pills, rope, extension cords,  firearm)  · Get professional help from the community (Mental Health, Substance Abuse, psychological counseling)  · Do not be alone:Call your Safe Contact- someone whom you trust who will be there for you.  · Call your local CRISIS HOTLINE 789-2975 or 204-499-5232  · Call your local Children's Mobile Crisis Response Team [place] (934) 988-2114 or www.b5media  · Call the toll free National Suicide Prevention Hotlines   · National Suicide Prevention Lifeline 818-509-EKST (3516)  · [employer] Line Network 800-SUICIDE (284-4274)          Community-Acquired Pneumonia, Adult  Pneumonia is an infection of the lungs. It causes swelling in the airways of the lungs. Mucus and fluid may also build up inside the airways.  One type of pneumonia can happen while a person is in a hospital. A different type can happen when a person is not in a hospital (community-acquired pneumonia).   What are the causes?    This condition is caused by germs (viruses, bacteria, or fungi). Some types of germs can be passed from one person to another. This can happen when you breathe in droplets from the cough or sneeze of an infected person.  What increases the risk?  You are more likely to develop this condition if you:  · Have a long-term (chronic) disease, such as:  ? Chronic obstructive pulmonary disease (COPD).  ? Asthma.  ? Cystic fibrosis.  ? Congestive heart failure.  ? Diabetes.  ? Kidney disease.  · Have HIV.  · Have sickle cell disease.  · Have had your spleen removed.  · Do not take good care of your teeth and mouth (poor dental hygiene).  · Have a medical condition that increases the risk of breathing in droplets from your own mouth and nose.  · Have a weakened body defense system (immune system).  · Are a smoker.  · Travel to areas where the germs that cause this illness are common.  · Are around certain animals or the places they live.  What are the signs or symptoms?  · A dry cough.  · A wet (productive)  cough.  · Fever.  · Sweating.  · Chest pain. This often happens when breathing deeply or coughing.  · Fast breathing or trouble breathing.  · Shortness of breath.  · Shaking chills.  · Feeling tired (fatigue).  · Muscle aches.  How is this treated?  Treatment for this condition depends on many things. Most adults can be treated at home. In some cases, treatment must happen in a hospital. Treatment may include:  · Medicines given by mouth or through an IV tube.  · Being given extra oxygen.  · Respiratory therapy.  In rare cases, treatment for very bad pneumonia may include:  · Using a machine to help you breathe.  · Having a procedure to remove fluid from around your lungs.  Follow these instructions at home:  Medicines  · Take over-the-counter and prescription medicines only as told by your doctor.  ? Only take cough medicine if you are losing sleep.  · If you were prescribed an antibiotic medicine, take it as told by your doctor. Do not stop taking the antibiotic even if you start to feel better.  General instructions    · Sleep with your head and neck raised (elevated). You can do this by sleeping in a recliner or by putting a few pillows under your head.  · Rest as needed. Get at least 8 hours of sleep each night.  · Drink enough water to keep your pee (urine) pale yellow.  · Eat a healthy diet that includes plenty of vegetables, fruits, whole grains, low-fat dairy products, and lean protein.  · Do not use any products that contain nicotine or tobacco. These include cigarettes, e-cigarettes, and chewing tobacco. If you need help quitting, ask your doctor.  · Keep all follow-up visits as told by your doctor. This is important.  How is this prevented?  A shot (vaccine) can help prevent pneumonia. Shots are often suggested for:  · People older than 65 years of age.  · People older than 19 years of age who:  ? Are having cancer treatment.  ? Have long-term (chronic) lung disease.  ? Have problems with their body's  defense system.  You may also prevent pneumonia if you take these actions:  · Get the flu (influenza) shot every year.  · Go to the dentist as often as told.  · Wash your hands often. If you cannot use soap and water, use hand .  Contact a doctor if:  · You have a fever.  · You lose sleep because your cough medicine does not help.  Get help right away if:  · You are short of breath and it gets worse.  · You have more chest pain.  · Your sickness gets worse. This is very serious if:  ? You are an older adult.  ? Your body's defense system is weak.  · You cough up blood.  Summary  · Pneumonia is an infection of the lungs.  · Most adults can be treated at home. Some will need treatment in a hospital.  · Drink enough water to keep your pee pale yellow.  · Get at least 8 hours of sleep each night.  This information is not intended to replace advice given to you by your health care provider. Make sure you discuss any questions you have with your health care provider.  Document Released: 06/05/2009 Document Revised: 04/08/2020 Document Reviewed: 08/15/2019  Elsevier Patient Education © 2020 [employer] Inc[employer]

## 2022-01-01 NOTE — PROGRESS NOTES
Received report from day shift nurse.   Assumed pt care\  Pt is A&Ox4, resting comfortably in bed.   Pt on 2L of oxygen via NC  No signs of SOB/respiratory distress.   Labs noted, VSS.   Prn Norco 5-325mg x1 tab given per MAR for c/o lower back and generalized pain   Assessment completed  Fall precautions in place.   Bed at lowest position.   Call light and personal belongings within reach.   Continue to monitor

## 2022-01-01 NOTE — DISCHARGE SUMMARY
Discharge Summary    CHIEF COMPLAINT ON ADMISSION  Chief Complaint   Patient presents with   • GLF     Low back and tailbone pain with difficulty ambulating after slip and fall tonight.        Reason for Admission  EMS     Admission Date  12/28/2021    CODE STATUS  DNAR, I OK    HPI & HOSPITAL COURSE  This is a 88 y.o. female with a history of hypertension, COPD which is nonoxygen dependentadmitted after ground-level fall and inability to ambulate.  When yaniv arrived at her house they found her to be hypoxic.  On admission she was found to have hypertensive urgency, acute respiratory failure with hypoxia requiring 2 L, chest x-ray consistent with community-acquired pneumonia.  CT imaging revealed L1 compression fracture as well as right pubic ramus fracture.  The case was discussed with surgery and they said this was nonoperative and also recommended weightbearing as tolerated.    Her acute respiratory failure with hypoxia was treated with supplemental oxygen and she was requiring 2 L.  She was started on ceftriaxone and azithromycin treatment and had improvement of her hypoxia.  She still required 1 to 2 L however therefore inhaled Symbicort and duo nebs were added given her history of COPD.  She was unable to completely wean from oxygen    PT worked with the patient recommended skilled nursing facility    Initially she had elevated blood pressure in the 190s to 200s, this may have been partially secondary to pain.  Amlodipine was added and her blood pressure improved.    She will continue to need ongoing PT and OT due to her extensive, nonoperative fractures.  [facility] was discharged there is able condition.  She will need ongoing pain management as well as oxygen until she is able to wean to room air.    Therefore, she is discharged in fair and stable condition to skilled nursing facility.    The patient met 2-midnight criteria for an inpatient stay at the time of  discharge.    Discharge Date  1/1/2022    FOLLOW UP ITEMS POST DISCHARGE  Follow-up with PCP in 1-2 weeks    DISCHARGE DIAGNOSES  Principal Problem:    Acute respiratory failure with hypoxia (HCC) POA: Yes  Active Problems:    Hyperlipidemia POA: Yes    COPD (chronic obstructive pulmonary disease) ([facility]) POA: Yes    Hypertensive urgency POA: Yes    Pneumonia of left upper lobe due to infectious organism POA: Yes    Multiple fractures of pelvis ([facility]) POA: Yes    Closed compression fracture of body of L1 vertebra ([facility]) POA: Yes    Severe protein-calorie malnutrition (HCC) POA: Unknown    Urinary retention POA: Unknown    Advance care planning POA: Unknown  Resolved Problems:    * No resolved hospital problems. *      FOLLOW UP  [facility] [address] [id number] [phone]          MEDICATIONS ON DISCHARGE     Medication List      START taking these medications      Instructions   amLODIPine 10 MG Tabs  Start taking on: January 2, 2022  Commonly known as: NORVASC   Take 1 Tablet by mouth every day.  Dose: 10 mg     aspirin EC 81 MG Tbec  Commonly known as: ECOTRIN   Take 1 Tablet by mouth 2 times a day for 30 days.  Dose: 81 mg     budesonide-formoterol 80-4.5 MCG/ACT Aero  Commonly known as: SYMBICORT   Inhale 2 Puffs 2 times a day.  Dose: 2 Puff     cefdinir 300 MG Caps  Commonly known as: OMNICEF   Take 1 Capsule by mouth every 12 hours for 2 days.  Dose: 300 mg     HYDROcodone-acetaminophen 5-325 MG Tabs per tablet  Commonly known as: NORCO   Take 1-2 Tablets by mouth every 6 hours as needed for up to 5 days.  Dose: 1-2 Tablet        CONTINUE taking these medications      Instructions   benazepril 40 MG tablet  Commonly known as: LOTENSIN   Take 1 Tab by mouth every day.  Dose: 40 mg     CALCIUM 500 PO   Take 500 mg by mouth 2 times a day.  Dose: 500 mg     CHOLESTOFF PLUS PO   Take 2 Capsules by mouth 2 times a day.  Dose: 2 Capsule     latanoprost 0.005 % Soln  Commonly  known as: XALATAN   Administer 1 Drop into the left eye at bedtime.  Dose: 1 Drop     lovastatin 10 MG tablet  Commonly known as: MEVACOR   Take 10 mg by mouth every evening.  Dose: 10 mg     risedronate 35 MG Tabs  Commonly known as: ACTONEL   Take 35 mg by mouth every 7 days.  Dose: 35 mg     Vitamin D3 25 MCG (1000 UT) Caps   Take 1,000 Units by mouth every morning.  Dose: 1,000 Units            Allergies  Allergies   Allergen Reactions   • Amoxicillin-Pot Clavulanate Hives   • Diclofenac Swelling   • Sulfamethoxazole-Trimethoprim Hives   • Codeine Vomiting and Nausea       DIET  Orders Placed This Encounter   Procedures   • Diet Order Diet: Regular     Standing Status:   Standing     Number of Occurrences:   1     Order Specific Question:   Diet:     Answer:   Regular [1]       ACTIVITY  As tolerated.  Weight bearing as tolerated    CONSULTATIONS  Dr. Bae Ortho    PROCEDURES  None    LABORATORY  Lab Results   Component Value Date    SODIUM 134 (L) 01/01/2022    POTASSIUM 4.4 01/01/2022    CHLORIDE 100 01/01/2022    CO2 27 01/01/2022    GLUCOSE 92 01/01/2022    BUN 22 01/01/2022    CREATININE 0.59 01/01/2022        Lab Results   Component Value Date    WBC 7.0 01/01/2022    HEMOGLOBIN 15.8 01/01/2022    HEMATOCRIT 48.8 (H) 01/01/2022    PLATELETCT 186 01/01/2022        Total time of the discharge process exceeds 39 minutes.

## 2022-01-01 NOTE — PROGRESS NOTES
Assumed care of patient. Bedside report received from night shift RN. Patient updated on plan of care. Patient instructed to call for assistance before getting out of bed. Patient verbalized understanding. Call light is within reach and fall precautions are in place. All needs met at this time. Hourly rounding in place.

## 2022-01-01 NOTE — DISCHARGE PLANNING
Anticipated Discharge Disposition: [facility]    Action: LSW notified by [facility] that [facility] has a bed today and can take pt as soon as possible.    LSW faxed transport forms to DPA, requested [facility] at 1100.    LSW requested d/c summary from Dr. Adamson.    Barriers to Discharge: Confirmed transport    Plan: LSW to follow up with transport. LSW to complete transfer packet.    0930: LSW met with pt at bedside. Pt is aware she is [facility]. LSW notified pt transport is going to be around 1100. Pt states she will notify her family. Pt signed cobra consent to transfer. LSW notified pt of IMM rights.    0959: Per chart review, pt has negative covid test from 12/29.    1027: Transport is scheduled for 1100. Discharge summary is not in at this time. LSW requested d/c summary from Dr. Adamson be placed as soon as possible.     1030: LSW provided transfer packet to bedside RN. LSW notified bedside RN Mariela that packet still needs d/c summary. Per MD in rounds, d/c summary is completed.

## 2022-01-01 NOTE — DISCHARGE PLANNING
Received Transport Form @ 8917  Spoke to Amelie CORREA    Transport is scheduled for 1/1 @1100 going to [facility].

## 2022-01-03 PROBLEM — J96.01 ACUTE RESPIRATORY FAILURE WITH HYPOXIA (HCC): Chronic | Status: ACTIVE | Noted: 2021-12-29

## 2022-01-03 PROBLEM — J44.9 COPD (CHRONIC OBSTRUCTIVE PULMONARY DISEASE) (HCC): Chronic | Status: ACTIVE | Noted: 2018-05-23

## 2022-01-03 PROBLEM — G89.29 CHRONIC PAIN: Status: ACTIVE | Noted: 2022-01-03

## 2022-01-03 PROBLEM — M81.0 AGE RELATED OSTEOPOROSIS: Status: ACTIVE | Noted: 2022-01-03

## 2022-01-03 PROBLEM — J18.9 PNEUMONIA: Chronic | Status: ACTIVE | Noted: 2021-12-29

## 2022-01-03 PROBLEM — S32.010A CLOSED COMPRESSION FRACTURE OF BODY OF L1 VERTEBRA (HCC): Chronic | Status: ACTIVE | Noted: 2021-12-29

## 2022-01-03 PROBLEM — S32.82XA MULTIPLE FRACTURES OF PELVIS (HCC): Chronic | Status: ACTIVE | Noted: 2021-12-29

## 2022-01-03 PROBLEM — I10 PRIMARY HYPERTENSION: Chronic | Status: ACTIVE | Noted: 2022-01-03

## 2022-01-03 PROBLEM — E78.5 HYPERLIPIDEMIA: Chronic | Status: ACTIVE | Noted: 2018-05-23

## 2022-01-03 PROBLEM — E43 SEVERE PROTEIN-CALORIE MALNUTRITION (HCC): Chronic | Status: ACTIVE | Noted: 2021-12-29

## 2022-01-03 PROBLEM — M81.0 AGE RELATED OSTEOPOROSIS: Chronic | Status: ACTIVE | Noted: 2022-01-03

## 2022-01-03 PROBLEM — I10 PRIMARY HYPERTENSION: Status: ACTIVE | Noted: 2022-01-03

## 2022-01-03 LAB
BACTERIA BLD CULT: NORMAL
SIGNIFICANT IND 70042: NORMAL
SITE SITE: NORMAL
SOURCE SOURCE: NORMAL

## 2022-01-04 LAB
BACTERIA BLD CULT: NORMAL
SIGNIFICANT IND 70042: NORMAL
SITE SITE: NORMAL
SOURCE SOURCE: NORMAL

## 2022-01-05 PROBLEM — M79.89 LEG SWELLING: Status: ACTIVE | Noted: 2022-01-05

## 2022-01-10 PROBLEM — H40.89 OTHER SPECIFIED GLAUCOMA: Status: ACTIVE | Noted: 2022-01-10

## 2022-01-10 PROBLEM — H40.89 OTHER SPECIFIED GLAUCOMA: Chronic | Status: ACTIVE | Noted: 2022-01-10

## 2022-02-22 ENCOUNTER — APPOINTMENT (OUTPATIENT)
Dept: RADIOLOGY | Facility: MEDICAL CENTER | Age: 87
End: 2022-02-22
Attending: PSYCHIATRY & NEUROLOGY
Payer: MEDICARE

## 2022-02-25 ENCOUNTER — HOSPITAL ENCOUNTER (OUTPATIENT)
Dept: RADIOLOGY | Facility: MEDICAL CENTER | Age: 87
End: 2022-02-25
Attending: PSYCHIATRY & NEUROLOGY
Payer: MEDICARE

## 2022-02-25 DIAGNOSIS — R26.89 IMBALANCE: ICD-10-CM

## 2022-02-25 PROCEDURE — 72141 MRI NECK SPINE W/O DYE: CPT | Mod: MH

## 2022-03-04 ENCOUNTER — HOSPITAL ENCOUNTER (OUTPATIENT)
Dept: LAB | Facility: MEDICAL CENTER | Age: 87
End: 2022-03-04
Attending: INTERNAL MEDICINE
Payer: MEDICARE

## 2022-03-04 LAB
25(OH)D3 SERPL-MCNC: 76 NG/ML (ref 30–100)
ANION GAP SERPL CALC-SCNC: 14 MMOL/L (ref 7–16)
BUN SERPL-MCNC: 28 MG/DL (ref 8–22)
CA-I SERPL-SCNC: 1.2 MMOL/L (ref 1.1–1.3)
CALCIUM SERPL-MCNC: 10 MG/DL (ref 8.5–10.5)
CHLORIDE SERPL-SCNC: 94 MMOL/L (ref 96–112)
CO2 SERPL-SCNC: 27 MMOL/L (ref 20–33)
CREAT SERPL-MCNC: 0.99 MG/DL (ref 0.5–1.4)
GLUCOSE SERPL-MCNC: 96 MG/DL (ref 65–99)
POTASSIUM SERPL-SCNC: 3.6 MMOL/L (ref 3.6–5.5)
SODIUM SERPL-SCNC: 135 MMOL/L (ref 135–145)

## 2022-03-04 PROCEDURE — 82306 VITAMIN D 25 HYDROXY: CPT

## 2022-03-04 PROCEDURE — 36415 COLL VENOUS BLD VENIPUNCTURE: CPT

## 2022-03-04 PROCEDURE — 80048 BASIC METABOLIC PNL TOTAL CA: CPT

## 2022-03-04 PROCEDURE — 82330 ASSAY OF CALCIUM: CPT

## 2022-06-03 ENCOUNTER — OCCUPATIONAL THERAPY (OUTPATIENT)
Dept: OCCUPATIONAL THERAPY | Facility: REHABILITATION | Age: 87
End: 2022-06-03
Attending: FAMILY MEDICINE
Payer: MEDICARE

## 2022-06-03 DIAGNOSIS — Z02.4 ENCOUNTER FOR EXAMINATION FOR DRIVING LICENSE: ICD-10-CM

## 2022-06-03 PROCEDURE — 97750 PHYSICAL PERFORMANCE TEST: CPT

## 2022-06-03 ASSESSMENT — BALANCE ASSESSMENTS
BALANCE - SITTING STATIC: NORMAL
BALANCE - SITTING DYNAMIC: NORMAL
BALANCE - STANDING STATIC: GOOD
BALANCE - STANDING DYNAMIC: FAIR +

## 2022-06-03 NOTE — OP THERAPY EVALUATION
Outpatient Occupational Therapy  DRIVING EVALUATION    [facility] Occupational Therapy [address]  [address]  [place] [address]  Phone:  [phone]  Fax:  [phone]    Date of Evaluation: 2022  Name of Evaluator: JOSE Gilliam/L    Background  Patient Name: Diana Tristan  YOB: 1933 Age: 89 y.o. Sex: female      Referring Provider: Mariya Bhardwaj M.D.  [address] [place] [address] [id number]  [address]  [place] [address]   Referring Diagnosis Encounter for examination for driving license [Z02.4]     Occupational Therapy Occurrence Codes           Concerns: Client states she has no concerns regarding her driving ability.  Client states she drives normally on familiar roads and avoids the freeway.  Drives mostly during the day and at night, if necessary.      Driving Evaluation     Vehicle/ Details:     Make: [employer]     Model: [employer]    Year:     Features: automatic and power steering    Comments: Client's [place] license  in March of this year.  DMV requesting a doctor's release form before they renew.      Physical Assessment:   Auditory:     Hearing aids: hearing aid    Hearing problems: hearing problem    Strength:     Upper extremity (left): within functional limits    Upper extremity (right): within functional limits    Lower extremity (left): within functional limits    Lower extremity (right): within functional limits     (left): within functional limits     (right): within functional limits    Coordination:     Upper extremity (left): within functional limits        Finger to finger: within functional limits    Upper extremity (right): within functional limits        Finger to finger: within functional limits    Lower extremity (left): within functional limits        Heel to shin: within functional limits    Lower extremity (right): within functional limits        Heel to shin: within functional limits    Sensation:   Upper extremity (left):    Light  touch: intact    Proprioception: intact   Upper extremity (right):    Light touch: intact    Proprioception: intact   Lower extremity (left):    Light touch: intact    Proprioception: intact   Lower extremity (right):    Light touch: intact    Proprioception: intact     Balance:     Sitting (static): Normal    Sitting (dynamic): Normal    Standing (static): Good    Standing (dynamic): Fair +    Sit to stand: independent    Rapid Pace Walk Test: 12 sec    Cognition:     [facility] Mental Examination (UMS): 28/30    [employer] Making Test B: 142 (Successfully completed with no errors.) sec    Sign Identification: 10/10    Vision:     Last eye exam: 6/3/2021    Previous eye problems: bilateral cataracts and left eye glaucoma    Previous eye treatments: surgery ( cataract sugery 2017)    Corrective lens type: reading glasses    Corrective lens used since: about 20 years     Corrective lens used during: daytime and nighttime    Near acuity (Snellen Chart) Binocular: 30    Far acuity (Snellen Chart) Binocular: 30    Contrast sensitivity (day): adequate    Contrast sensitivity (night): diminished    Depth perception: adequate    Color vision: intact    MVPT-3 Visual Closure Subset:        Correct answers: (ex) (A), 22 (B), 23 (A), 24 (B), 25 (C), 26 (B), 27 (D), 28 (A), 29 (D), 30 (C), 31 (D), 32 (A), 33 (C) and 34 (D)        Score: 14/13        Accuracy: 107.69% correct        Pass/Fail: pass    Additional Physical Assessment Notes:     Currently mobilizing independently with a front wheeled walker.   Full ocular ROM.  Smooth pursuits, saccades, and convergence WNL.  Peripheral vision: 85 degrees each eye for a total of 170 degrees of arc.    Driving Simulator Tasks:   Motor Skills:     Using the accelerator: safe    Using the brake: safe    Using the steering wheel: safe    Reaction time to applying the brake: pass        Comments: 0.9 second    Following distance 3-4 sec rule: pass        Comments: Some  difficulty slowing from intial speed of 55 mph to recommended speed of 40 mph.  Once acheived, client able to stay in christian and recommended speed.  Kept a safe distance from van in front.      Configurable Crash Avoidance Scenarios:     Scenario 1:     Country road, dry and good visibility    Visibility: Able to stay in christian and recommended speed.  Slowed for a moped and kept a safe distance.      Pass/Fail: pass      Scenario 2:     Country road, dry and good visibility    Visibility: Able to stay in christian and recommended speed.  Slowed for car signalling to come out onto road that what parked on side of the road.      Pass/Fail: pass      Scenario 3:     City road, dry and good visibility    Visibility: Able to stay in christian and recommended speed.  Stopped for car coming from left side driving through intersection.     Pass/Fail: pass      Scenario 4:     City road, dry and good visibility    Visibility: Able to safely maneuver from parked postion into christian with heavy traffic and then safely manuevered into next christian with no issue    Pass/Fail: pass    Dividing and Focusing Attention:     Scenario 1:     Country road, dry and good visibility    Pass/Fail: pass    Comments: Able to stay in christian and recommended speed.  Client saw deer coming from right side and slowed to let it cross      Scenario 2:     City road, dry and good visibility    Pass/Fail: pass    Comments: Able to safely maneuver into left christian in preparation for turning left onto road.  Safely turned left into correct christian.  Stopped for jordon walker crossing road to catch bus      Free Driving Scenario 1:    Country road, dry and good visibility    Comments: Practice session to acclimate to the road, recommended speed and use of steering wheel, gas and brake pedals.       Free Driving Scenario 2:    City road, dry and good visibility    Comments: Practice session to acclimate to the road, recommended speed and use of steering wheel, gas and brake pedals.  "    Additional Driving Simulator Comments:     Good use of signals throughout assessment    Evaluation:     Pass/Fail: pass    Evaluation Comments: The objective findings indicate that Mrs. Diana Tristan has the physical, visual, visual-perceptual, and cognitive skills necessary to safely operate a vehicle.  She exhibited adequate reaction times and good safety distances with all simulator tasks.  In both rural and city settings where there were mild to moderate distractions, she did not have any accidents in multiple crash avoidance scenarios with pedestrians, animals, vehicles, or stationary objects.   She obeyed all regulatory signs, speed limits, maintained mid-christian position at all times, followed all verbal directions, safely passed other vehicles, and was able to divide and focus her attention throughout the driving simulation without difficulty.  Overall, Mrs. Tristan demonstrated good safety awareness, judgement, and anticipatory skills.  Based on her performance today, I recommend she transition back to driving under the following conditions: during the day, good weather conditions, at low traffic times, on familiar routes, avoidance of the \"spaghetti bowl\", minimize distractions, and with her family member as a passenger during her first 5 drives to provide her with \"on-the-road\" feedback.  Mrs. Tristan was in full agreement with these recommendations.    Operating a motor vehicle is a privilege in the [facility].  When a medical condition interferes with a person's ability to drive safely, it is the responsibility of the physician to approve driving or revoke the patient's license.  This test was not an on-the-road driving evaluation.  It was intended to identify the physical, visual, perceptual and cognitive deficits that may impair an individual's ability to safely operate a motor vehicle.    Please contact me with any questions regarding this case at [facility] at [phone]. "  Thank you for this referral and the safety concerns for your patients and others.    Sincerely,    Ida Walter. OTR/L      Referring provider co-signature:  I have reviewed this evaluation and my co-signature certifies my agreement with the contents.    Physician Signature: ________________________________ Date: ______________

## 2022-09-16 ENCOUNTER — HOSPITAL ENCOUNTER (OUTPATIENT)
Dept: LAB | Facility: MEDICAL CENTER | Age: 87
End: 2022-09-16
Attending: INTERNAL MEDICINE
Payer: MEDICARE

## 2022-09-16 LAB
ANION GAP SERPL CALC-SCNC: 10 MMOL/L (ref 7–16)
BUN SERPL-MCNC: 22 MG/DL (ref 8–22)
CA-I SERPL-SCNC: 1.2 MMOL/L (ref 1.1–1.3)
CALCIUM SERPL-MCNC: 10.1 MG/DL (ref 8.5–10.5)
CHLORIDE SERPL-SCNC: 97 MMOL/L (ref 96–112)
CO2 SERPL-SCNC: 31 MMOL/L (ref 20–33)
CREAT SERPL-MCNC: 0.8 MG/DL (ref 0.5–1.4)
GFR SERPLBLD CREATININE-BSD FMLA CKD-EPI: 70 ML/MIN/1.73 M 2
GLUCOSE SERPL-MCNC: 74 MG/DL (ref 65–99)
POTASSIUM SERPL-SCNC: 3.7 MMOL/L (ref 3.6–5.5)
SODIUM SERPL-SCNC: 138 MMOL/L (ref 135–145)

## 2022-09-16 PROCEDURE — 36415 COLL VENOUS BLD VENIPUNCTURE: CPT

## 2022-09-16 PROCEDURE — 80048 BASIC METABOLIC PNL TOTAL CA: CPT

## 2022-09-16 PROCEDURE — 82330 ASSAY OF CALCIUM: CPT

## 2022-11-09 ENCOUNTER — PATIENT MESSAGE (OUTPATIENT)
Dept: HEALTH INFORMATION MANAGEMENT | Facility: OTHER | Age: 87
End: 2022-11-09

## 2023-02-14 ENCOUNTER — HOSPITAL ENCOUNTER (OUTPATIENT)
Dept: LAB | Facility: MEDICAL CENTER | Age: 88
End: 2023-02-14
Payer: MEDICARE

## 2023-02-14 LAB
ALBUMIN SERPL BCP-MCNC: 4.4 G/DL (ref 3.2–4.9)
ALBUMIN/GLOB SERPL: 1.5 G/DL
ALP SERPL-CCNC: 49 U/L (ref 30–99)
ALT SERPL-CCNC: 10 U/L (ref 2–50)
ANION GAP SERPL CALC-SCNC: 8 MMOL/L (ref 7–16)
APPEARANCE UR: CLEAR
AST SERPL-CCNC: 26 U/L (ref 12–45)
BACTERIA #/AREA URNS HPF: ABNORMAL /HPF
BASOPHILS # BLD AUTO: 0.5 % (ref 0–1.8)
BASOPHILS # BLD: 0.03 K/UL (ref 0–0.12)
BILIRUB SERPL-MCNC: 0.6 MG/DL (ref 0.1–1.5)
BILIRUB UR QL STRIP.AUTO: NEGATIVE
BUN SERPL-MCNC: 25 MG/DL (ref 8–22)
CALCIUM ALBUM COR SERPL-MCNC: 9.7 MG/DL (ref 8.5–10.5)
CALCIUM SERPL-MCNC: 10 MG/DL (ref 8.5–10.5)
CHLORIDE SERPL-SCNC: 97 MMOL/L (ref 96–112)
CHOLEST SERPL-MCNC: 173 MG/DL (ref 100–199)
CO2 SERPL-SCNC: 31 MMOL/L (ref 20–33)
COLOR UR: YELLOW
CREAT SERPL-MCNC: 0.93 MG/DL (ref 0.5–1.4)
EOSINOPHIL # BLD AUTO: 0.11 K/UL (ref 0–0.51)
EOSINOPHIL NFR BLD: 1.8 % (ref 0–6.9)
EPI CELLS #/AREA URNS HPF: ABNORMAL /HPF
ERYTHROCYTE [DISTWIDTH] IN BLOOD BY AUTOMATED COUNT: 48.1 FL (ref 35.9–50)
FASTING STATUS PATIENT QL REPORTED: NORMAL
GFR SERPLBLD CREATININE-BSD FMLA CKD-EPI: 58 ML/MIN/1.73 M 2
GLOBULIN SER CALC-MCNC: 2.9 G/DL (ref 1.9–3.5)
GLUCOSE SERPL-MCNC: 94 MG/DL (ref 65–99)
GLUCOSE UR STRIP.AUTO-MCNC: NEGATIVE MG/DL
HCT VFR BLD AUTO: 48.8 % (ref 37–47)
HDLC SERPL-MCNC: 77 MG/DL
HGB BLD-MCNC: 15.9 G/DL (ref 12–16)
HYALINE CASTS #/AREA URNS LPF: ABNORMAL /LPF
IMM GRANULOCYTES # BLD AUTO: 0.02 K/UL (ref 0–0.11)
IMM GRANULOCYTES NFR BLD AUTO: 0.3 % (ref 0–0.9)
KETONES UR STRIP.AUTO-MCNC: NEGATIVE MG/DL
LDLC SERPL CALC-MCNC: 80 MG/DL
LEUKOCYTE ESTERASE UR QL STRIP.AUTO: ABNORMAL
LYMPHOCYTES # BLD AUTO: 2.22 K/UL (ref 1–4.8)
LYMPHOCYTES NFR BLD: 35.7 % (ref 22–41)
MCH RBC QN AUTO: 30.2 PG (ref 27–33)
MCHC RBC AUTO-ENTMCNC: 32.6 G/DL (ref 33.6–35)
MCV RBC AUTO: 92.8 FL (ref 81.4–97.8)
MICRO URNS: ABNORMAL
MONOCYTES # BLD AUTO: 0.66 K/UL (ref 0–0.85)
MONOCYTES NFR BLD AUTO: 10.6 % (ref 0–13.4)
NEUTROPHILS # BLD AUTO: 3.18 K/UL (ref 2–7.15)
NEUTROPHILS NFR BLD: 51.1 % (ref 44–72)
NITRITE UR QL STRIP.AUTO: NEGATIVE
NRBC # BLD AUTO: 0 K/UL
NRBC BLD-RTO: 0 /100 WBC
PH UR STRIP.AUTO: 7 [PH] (ref 5–8)
PLATELET # BLD AUTO: 218 K/UL (ref 164–446)
PMV BLD AUTO: 10 FL (ref 9–12.9)
POTASSIUM SERPL-SCNC: 4.1 MMOL/L (ref 3.6–5.5)
PROT SERPL-MCNC: 7.3 G/DL (ref 6–8.2)
PROT UR QL STRIP: NEGATIVE MG/DL
RBC # BLD AUTO: 5.26 M/UL (ref 4.2–5.4)
RBC # URNS HPF: ABNORMAL /HPF
RBC UR QL AUTO: NEGATIVE
SODIUM SERPL-SCNC: 136 MMOL/L (ref 135–145)
SP GR UR STRIP.AUTO: 1.02
T3FREE SERPL-MCNC: 2.53 PG/ML (ref 2–4.4)
T4 FREE SERPL-MCNC: 1 NG/DL (ref 0.93–1.7)
TRIGL SERPL-MCNC: 78 MG/DL (ref 0–149)
TSH SERPL DL<=0.005 MIU/L-ACNC: 1.78 UIU/ML (ref 0.38–5.33)
UROBILINOGEN UR STRIP.AUTO-MCNC: 0.2 MG/DL
WBC # BLD AUTO: 6.2 K/UL (ref 4.8–10.8)
WBC #/AREA URNS HPF: ABNORMAL /HPF

## 2023-02-14 PROCEDURE — 83036 HEMOGLOBIN GLYCOSYLATED A1C: CPT | Mod: GA

## 2023-02-14 PROCEDURE — 80061 LIPID PANEL: CPT

## 2023-02-14 PROCEDURE — 84481 FREE ASSAY (FT-3): CPT

## 2023-02-14 PROCEDURE — 36415 COLL VENOUS BLD VENIPUNCTURE: CPT

## 2023-02-14 PROCEDURE — 84443 ASSAY THYROID STIM HORMONE: CPT

## 2023-02-14 PROCEDURE — 81001 URINALYSIS AUTO W/SCOPE: CPT

## 2023-02-14 PROCEDURE — 86800 THYROGLOBULIN ANTIBODY: CPT

## 2023-02-14 PROCEDURE — 84439 ASSAY OF FREE THYROXINE: CPT

## 2023-02-14 PROCEDURE — 80053 COMPREHEN METABOLIC PANEL: CPT

## 2023-02-14 PROCEDURE — 85025 COMPLETE CBC W/AUTO DIFF WBC: CPT

## 2023-02-15 LAB — THYROGLOB AB SERPL-ACNC: <0.9 IU/ML (ref 0–4)

## 2023-02-16 LAB
EST. AVERAGE GLUCOSE BLD GHB EST-MCNC: 131 MG/DL
HBA1C MFR BLD: 6.2 % (ref 4–5.6)

## 2023-09-07 ENCOUNTER — HOSPITAL ENCOUNTER (OUTPATIENT)
Dept: LAB | Facility: MEDICAL CENTER | Age: 88
End: 2023-09-07
Attending: INTERNAL MEDICINE
Payer: MEDICARE

## 2023-09-07 LAB
25(OH)D3 SERPL-MCNC: 63 NG/ML (ref 30–100)
ANION GAP SERPL CALC-SCNC: 9 MMOL/L (ref 7–16)
BUN SERPL-MCNC: 21 MG/DL (ref 8–22)
CA-I SERPL-SCNC: 1.2 MMOL/L (ref 1.1–1.3)
CALCIUM SERPL-MCNC: 9.2 MG/DL (ref 8.5–10.5)
CHLORIDE SERPL-SCNC: 99 MMOL/L (ref 96–112)
CO2 SERPL-SCNC: 29 MMOL/L (ref 20–33)
CREAT SERPL-MCNC: 0.98 MG/DL (ref 0.5–1.4)
GFR SERPLBLD CREATININE-BSD FMLA CKD-EPI: 55 ML/MIN/1.73 M 2
GLUCOSE SERPL-MCNC: 142 MG/DL (ref 65–99)
POTASSIUM SERPL-SCNC: 4.1 MMOL/L (ref 3.6–5.5)
SODIUM SERPL-SCNC: 137 MMOL/L (ref 135–145)

## 2023-09-07 PROCEDURE — 80048 BASIC METABOLIC PNL TOTAL CA: CPT

## 2023-09-07 PROCEDURE — 36415 COLL VENOUS BLD VENIPUNCTURE: CPT

## 2023-09-07 PROCEDURE — 82306 VITAMIN D 25 HYDROXY: CPT

## 2023-09-07 PROCEDURE — 82330 ASSAY OF CALCIUM: CPT

## 2023-11-01 ENCOUNTER — HOSPITAL ENCOUNTER (OUTPATIENT)
Dept: RADIOLOGY | Facility: MEDICAL CENTER | Age: 88
End: 2023-11-01
Attending: INTERNAL MEDICINE
Payer: MEDICARE

## 2023-11-01 DIAGNOSIS — M81.0 SENILE OSTEOPOROSIS: ICD-10-CM

## 2023-11-01 PROCEDURE — 77080 DXA BONE DENSITY AXIAL: CPT

## 2023-11-07 ENCOUNTER — HOSPITAL ENCOUNTER (OUTPATIENT)
Dept: LAB | Facility: MEDICAL CENTER | Age: 88
End: 2023-11-07
Attending: INTERNAL MEDICINE
Payer: MEDICARE

## 2023-11-07 LAB
25(OH)D3 SERPL-MCNC: 65 NG/ML (ref 30–100)
ALBUMIN SERPL BCP-MCNC: 4.3 G/DL (ref 3.2–4.9)
ALBUMIN/GLOB SERPL: 1.5 G/DL
ALP SERPL-CCNC: 38 U/L (ref 30–99)
ALT SERPL-CCNC: 17 U/L (ref 2–50)
ANION GAP SERPL CALC-SCNC: 9 MMOL/L (ref 7–16)
AST SERPL-CCNC: 31 U/L (ref 12–45)
BASOPHILS # BLD AUTO: 0.4 % (ref 0–1.8)
BASOPHILS # BLD: 0.02 K/UL (ref 0–0.12)
BILIRUB SERPL-MCNC: 0.5 MG/DL (ref 0.1–1.5)
BUN SERPL-MCNC: 26 MG/DL (ref 8–22)
CA-I SERPL-SCNC: 1.3 MMOL/L (ref 1.1–1.3)
CALCIUM ALBUM COR SERPL-MCNC: 10 MG/DL (ref 8.5–10.5)
CALCIUM SERPL-MCNC: 10.2 MG/DL (ref 8.5–10.5)
CHLORIDE SERPL-SCNC: 101 MMOL/L (ref 96–112)
CO2 SERPL-SCNC: 30 MMOL/L (ref 20–33)
CREAT SERPL-MCNC: 0.88 MG/DL (ref 0.5–1.4)
EOSINOPHIL # BLD AUTO: 0.14 K/UL (ref 0–0.51)
EOSINOPHIL NFR BLD: 2.6 % (ref 0–6.9)
ERYTHROCYTE [DISTWIDTH] IN BLOOD BY AUTOMATED COUNT: 49.2 FL (ref 35.9–50)
GFR SERPLBLD CREATININE-BSD FMLA CKD-EPI: 62 ML/MIN/1.73 M 2
GLOBULIN SER CALC-MCNC: 2.9 G/DL (ref 1.9–3.5)
GLUCOSE SERPL-MCNC: 108 MG/DL (ref 65–99)
HCT VFR BLD AUTO: 48.4 % (ref 37–47)
HGB BLD-MCNC: 15.7 G/DL (ref 12–16)
IMM GRANULOCYTES # BLD AUTO: 0.01 K/UL (ref 0–0.11)
IMM GRANULOCYTES NFR BLD AUTO: 0.2 % (ref 0–0.9)
LYMPHOCYTES # BLD AUTO: 1.89 K/UL (ref 1–4.8)
LYMPHOCYTES NFR BLD: 34.7 % (ref 22–41)
MCH RBC QN AUTO: 30.4 PG (ref 27–33)
MCHC RBC AUTO-ENTMCNC: 32.4 G/DL (ref 32.2–35.5)
MCV RBC AUTO: 93.6 FL (ref 81.4–97.8)
MONOCYTES # BLD AUTO: 0.63 K/UL (ref 0–0.85)
MONOCYTES NFR BLD AUTO: 11.6 % (ref 0–13.4)
NEUTROPHILS # BLD AUTO: 2.76 K/UL (ref 1.82–7.42)
NEUTROPHILS NFR BLD: 50.5 % (ref 44–72)
NRBC # BLD AUTO: 0 K/UL
NRBC BLD-RTO: 0 /100 WBC (ref 0–0.2)
PLATELET # BLD AUTO: 230 K/UL (ref 164–446)
PMV BLD AUTO: 10 FL (ref 9–12.9)
POTASSIUM SERPL-SCNC: 4.1 MMOL/L (ref 3.6–5.5)
PROT SERPL-MCNC: 7.2 G/DL (ref 6–8.2)
RBC # BLD AUTO: 5.17 M/UL (ref 4.2–5.4)
SODIUM SERPL-SCNC: 140 MMOL/L (ref 135–145)
T3FREE SERPL-MCNC: 2.38 PG/ML (ref 2–4.4)
T4 FREE SERPL-MCNC: 1.06 NG/DL (ref 0.93–1.7)
TSH SERPL DL<=0.005 MIU/L-ACNC: 1.57 UIU/ML (ref 0.38–5.33)
VIT B12 SERPL-MCNC: 566 PG/ML (ref 211–911)
WBC # BLD AUTO: 5.5 K/UL (ref 4.8–10.8)

## 2023-11-07 PROCEDURE — 82330 ASSAY OF CALCIUM: CPT

## 2023-11-07 PROCEDURE — 36415 COLL VENOUS BLD VENIPUNCTURE: CPT

## 2023-11-07 PROCEDURE — 84481 FREE ASSAY (FT-3): CPT

## 2023-11-07 PROCEDURE — 85025 COMPLETE CBC W/AUTO DIFF WBC: CPT

## 2023-11-07 PROCEDURE — 82607 VITAMIN B-12: CPT

## 2023-11-07 PROCEDURE — 84439 ASSAY OF FREE THYROXINE: CPT

## 2023-11-07 PROCEDURE — 80053 COMPREHEN METABOLIC PANEL: CPT

## 2023-11-07 PROCEDURE — 84443 ASSAY THYROID STIM HORMONE: CPT

## 2023-11-07 PROCEDURE — 82306 VITAMIN D 25 HYDROXY: CPT

## 2024-01-16 ENCOUNTER — HOSPITAL ENCOUNTER (INPATIENT)
Facility: MEDICAL CENTER | Age: 89
LOS: 2 days | DRG: 603 | End: 2024-01-18
Attending: EMERGENCY MEDICINE | Admitting: INTERNAL MEDICINE
Payer: MEDICARE

## 2024-01-16 ENCOUNTER — APPOINTMENT (OUTPATIENT)
Dept: RADIOLOGY | Facility: MEDICAL CENTER | Age: 89
DRG: 603 | End: 2024-01-16
Attending: EMERGENCY MEDICINE
Payer: MEDICARE

## 2024-01-16 ENCOUNTER — APPOINTMENT (OUTPATIENT)
Dept: RADIOLOGY | Facility: MEDICAL CENTER | Age: 89
DRG: 603 | End: 2024-01-16
Payer: MEDICARE

## 2024-01-16 DIAGNOSIS — L03.90 CELLULITIS, UNSPECIFIED CELLULITIS SITE: ICD-10-CM

## 2024-01-16 DIAGNOSIS — L03.116 CELLULITIS OF LEFT LOWER LEG: ICD-10-CM

## 2024-01-16 DIAGNOSIS — J43.1 PANLOBULAR EMPHYSEMA (HCC): Chronic | ICD-10-CM

## 2024-01-16 DIAGNOSIS — Z78.9 FAILURE OF OUTPATIENT TREATMENT: ICD-10-CM

## 2024-01-16 DIAGNOSIS — R07.2 PRECORDIAL PAIN: ICD-10-CM

## 2024-01-16 LAB
ALBUMIN SERPL BCP-MCNC: 4.3 G/DL (ref 3.2–4.9)
ALBUMIN/GLOB SERPL: 1.6 G/DL
ALP SERPL-CCNC: 46 U/L (ref 30–99)
ALT SERPL-CCNC: 9 U/L (ref 2–50)
ANION GAP SERPL CALC-SCNC: 12 MMOL/L (ref 7–16)
AST SERPL-CCNC: 25 U/L (ref 12–45)
BASOPHILS # BLD AUTO: 0.3 % (ref 0–1.8)
BASOPHILS # BLD: 0.02 K/UL (ref 0–0.12)
BILIRUB SERPL-MCNC: 0.5 MG/DL (ref 0.1–1.5)
BUN SERPL-MCNC: 24 MG/DL (ref 8–22)
CALCIUM ALBUM COR SERPL-MCNC: 9.7 MG/DL (ref 8.5–10.5)
CALCIUM SERPL-MCNC: 9.9 MG/DL (ref 8.4–10.2)
CHLORIDE SERPL-SCNC: 95 MMOL/L (ref 96–112)
CO2 SERPL-SCNC: 26 MMOL/L (ref 20–33)
CREAT SERPL-MCNC: 0.97 MG/DL (ref 0.5–1.4)
CRP SERPL HS-MCNC: <0.3 MG/DL (ref 0–0.75)
EOSINOPHIL # BLD AUTO: 0.17 K/UL (ref 0–0.51)
EOSINOPHIL NFR BLD: 2.6 % (ref 0–6.9)
ERYTHROCYTE [DISTWIDTH] IN BLOOD BY AUTOMATED COUNT: 45.8 FL (ref 35.9–50)
ERYTHROCYTE [SEDIMENTATION RATE] IN BLOOD BY WESTERGREN METHOD: 4 MM/HOUR (ref 0–25)
GFR SERPLBLD CREATININE-BSD FMLA CKD-EPI: 55 ML/MIN/1.73 M 2
GLOBULIN SER CALC-MCNC: 2.7 G/DL (ref 1.9–3.5)
GLUCOSE SERPL-MCNC: 110 MG/DL (ref 65–99)
HCT VFR BLD AUTO: 46.3 % (ref 37–47)
HGB BLD-MCNC: 15.4 G/DL (ref 12–16)
IMM GRANULOCYTES # BLD AUTO: 0.02 K/UL (ref 0–0.11)
IMM GRANULOCYTES NFR BLD AUTO: 0.3 % (ref 0–0.9)
LACTATE SERPL-SCNC: 0.9 MMOL/L (ref 0.5–2)
LYMPHOCYTES # BLD AUTO: 1.59 K/UL (ref 1–4.8)
LYMPHOCYTES NFR BLD: 24.7 % (ref 22–41)
MCH RBC QN AUTO: 30.3 PG (ref 27–33)
MCHC RBC AUTO-ENTMCNC: 33.3 G/DL (ref 32.2–35.5)
MCV RBC AUTO: 91 FL (ref 81.4–97.8)
MONOCYTES # BLD AUTO: 0.79 K/UL (ref 0–0.85)
MONOCYTES NFR BLD AUTO: 12.2 % (ref 0–13.4)
NEUTROPHILS # BLD AUTO: 3.86 K/UL (ref 1.82–7.42)
NEUTROPHILS NFR BLD: 59.9 % (ref 44–72)
NRBC # BLD AUTO: 0 K/UL
NRBC BLD-RTO: 0 /100 WBC (ref 0–0.2)
PLATELET # BLD AUTO: 211 K/UL (ref 164–446)
PMV BLD AUTO: 9.3 FL (ref 9–12.9)
POTASSIUM SERPL-SCNC: 4.1 MMOL/L (ref 3.6–5.5)
PROT SERPL-MCNC: 7 G/DL (ref 6–8.2)
RBC # BLD AUTO: 5.09 M/UL (ref 4.2–5.4)
SODIUM SERPL-SCNC: 133 MMOL/L (ref 135–145)
WBC # BLD AUTO: 6.5 K/UL (ref 4.8–10.8)

## 2024-01-16 PROCEDURE — 99285 EMERGENCY DEPT VISIT HI MDM: CPT

## 2024-01-16 PROCEDURE — 96375 TX/PRO/DX INJ NEW DRUG ADDON: CPT

## 2024-01-16 PROCEDURE — 73590 X-RAY EXAM OF LOWER LEG: CPT | Mod: LT

## 2024-01-16 PROCEDURE — 770001 HCHG ROOM/CARE - MED/SURG/GYN PRIV*

## 2024-01-16 PROCEDURE — 700105 HCHG RX REV CODE 258: Performed by: INTERNAL MEDICINE

## 2024-01-16 PROCEDURE — 700111 HCHG RX REV CODE 636 W/ 250 OVERRIDE (IP): Performed by: INTERNAL MEDICINE

## 2024-01-16 PROCEDURE — A9270 NON-COVERED ITEM OR SERVICE: HCPCS | Performed by: INTERNAL MEDICINE

## 2024-01-16 PROCEDURE — 90471 IMMUNIZATION ADMIN: CPT

## 2024-01-16 PROCEDURE — 96366 THER/PROPH/DIAG IV INF ADDON: CPT

## 2024-01-16 PROCEDURE — 86140 C-REACTIVE PROTEIN: CPT

## 2024-01-16 PROCEDURE — 83605 ASSAY OF LACTIC ACID: CPT

## 2024-01-16 PROCEDURE — 90715 TDAP VACCINE 7 YRS/> IM: CPT | Performed by: EMERGENCY MEDICINE

## 2024-01-16 PROCEDURE — 85025 COMPLETE CBC W/AUTO DIFF WBC: CPT

## 2024-01-16 PROCEDURE — 87070 CULTURE OTHR SPECIMN AEROBIC: CPT

## 2024-01-16 PROCEDURE — 96365 THER/PROPH/DIAG IV INF INIT: CPT

## 2024-01-16 PROCEDURE — 85652 RBC SED RATE AUTOMATED: CPT

## 2024-01-16 PROCEDURE — 87040 BLOOD CULTURE FOR BACTERIA: CPT

## 2024-01-16 PROCEDURE — 700102 HCHG RX REV CODE 250 W/ 637 OVERRIDE(OP): Performed by: INTERNAL MEDICINE

## 2024-01-16 PROCEDURE — 3E0234Z INTRODUCTION OF SERUM, TOXOID AND VACCINE INTO MUSCLE, PERCUTANEOUS APPROACH: ICD-10-PCS | Performed by: EMERGENCY MEDICINE

## 2024-01-16 PROCEDURE — 94760 N-INVAS EAR/PLS OXIMETRY 1: CPT

## 2024-01-16 PROCEDURE — 80053 COMPREHEN METABOLIC PANEL: CPT

## 2024-01-16 PROCEDURE — 87205 SMEAR GRAM STAIN: CPT

## 2024-01-16 PROCEDURE — 700111 HCHG RX REV CODE 636 W/ 250 OVERRIDE (IP): Performed by: EMERGENCY MEDICINE

## 2024-01-16 PROCEDURE — 36415 COLL VENOUS BLD VENIPUNCTURE: CPT

## 2024-01-16 PROCEDURE — 99223 1ST HOSP IP/OBS HIGH 75: CPT | Mod: AI | Performed by: INTERNAL MEDICINE

## 2024-01-16 RX ORDER — ONDANSETRON 4 MG/1
4 TABLET, ORALLY DISINTEGRATING ORAL EVERY 4 HOURS PRN
Status: DISCONTINUED | OUTPATIENT
Start: 2024-01-16 | End: 2024-01-18 | Stop reason: HOSPADM

## 2024-01-16 RX ORDER — LABETALOL HYDROCHLORIDE 5 MG/ML
10 INJECTION, SOLUTION INTRAVENOUS EVERY 4 HOURS PRN
Status: DISCONTINUED | OUTPATIENT
Start: 2024-01-16 | End: 2024-01-17

## 2024-01-16 RX ORDER — AMLODIPINE BESYLATE 5 MG/1
10 TABLET ORAL DAILY
Status: DISCONTINUED | OUTPATIENT
Start: 2024-01-17 | End: 2024-01-18 | Stop reason: HOSPADM

## 2024-01-16 RX ORDER — ONDANSETRON 2 MG/ML
4 INJECTION INTRAMUSCULAR; INTRAVENOUS EVERY 4 HOURS PRN
Status: DISCONTINUED | OUTPATIENT
Start: 2024-01-16 | End: 2024-01-18 | Stop reason: HOSPADM

## 2024-01-16 RX ORDER — LATANOPROST 50 UG/ML
1 SOLUTION/ DROPS OPHTHALMIC
Status: DISCONTINUED | OUTPATIENT
Start: 2024-01-17 | End: 2024-01-18 | Stop reason: HOSPADM

## 2024-01-16 RX ORDER — HYDROCHLOROTHIAZIDE 25 MG/1
25 TABLET ORAL DAILY
COMMUNITY

## 2024-01-16 RX ORDER — BENAZEPRIL HYDROCHLORIDE 10 MG/1
40 TABLET ORAL DAILY
Status: DISCONTINUED | OUTPATIENT
Start: 2024-01-17 | End: 2024-01-18 | Stop reason: HOSPADM

## 2024-01-16 RX ORDER — LOVASTATIN 20 MG/1
10 TABLET ORAL NIGHTLY
Status: DISCONTINUED | OUTPATIENT
Start: 2024-01-16 | End: 2024-01-18 | Stop reason: HOSPADM

## 2024-01-16 RX ORDER — AMOXICILLIN 250 MG
2 CAPSULE ORAL 2 TIMES DAILY
Status: DISCONTINUED | OUTPATIENT
Start: 2024-01-16 | End: 2024-01-18 | Stop reason: HOSPADM

## 2024-01-16 RX ORDER — ACETAMINOPHEN 325 MG/1
650 TABLET ORAL EVERY 6 HOURS PRN
Status: DISCONTINUED | OUTPATIENT
Start: 2024-01-16 | End: 2024-01-18 | Stop reason: HOSPADM

## 2024-01-16 RX ORDER — CEFTRIAXONE 2 G/1
2000 INJECTION, POWDER, FOR SOLUTION INTRAMUSCULAR; INTRAVENOUS ONCE
Status: COMPLETED | OUTPATIENT
Start: 2024-01-16 | End: 2024-01-16

## 2024-01-16 RX ORDER — POLYETHYLENE GLYCOL 3350 17 G/17G
1 POWDER, FOR SOLUTION ORAL
Status: DISCONTINUED | OUTPATIENT
Start: 2024-01-16 | End: 2024-01-18 | Stop reason: HOSPADM

## 2024-01-16 RX ORDER — SODIUM CHLORIDE 9 MG/ML
INJECTION, SOLUTION INTRAVENOUS CONTINUOUS
Status: DISCONTINUED | OUTPATIENT
Start: 2024-01-16 | End: 2024-01-18 | Stop reason: HOSPADM

## 2024-01-16 RX ORDER — BISACODYL 10 MG
10 SUPPOSITORY, RECTAL RECTAL
Status: DISCONTINUED | OUTPATIENT
Start: 2024-01-16 | End: 2024-01-18 | Stop reason: HOSPADM

## 2024-01-16 RX ORDER — BUDESONIDE AND FORMOTEROL FUMARATE DIHYDRATE 80; 4.5 UG/1; UG/1
2 AEROSOL RESPIRATORY (INHALATION) 2 TIMES DAILY
Status: DISCONTINUED | OUTPATIENT
Start: 2024-01-16 | End: 2024-01-16

## 2024-01-16 RX ADMIN — LABETALOL HYDROCHLORIDE 10 MG: 5 INJECTION, SOLUTION INTRAVENOUS at 22:16

## 2024-01-16 RX ADMIN — CLOSTRIDIUM TETANI TOXOID ANTIGEN (FORMALDEHYDE INACTIVATED), CORYNEBACTERIUM DIPHTHERIAE TOXOID ANTIGEN (FORMALDEHYDE INACTIVATED), BORDETELLA PERTUSSIS TOXOID ANTIGEN (GLUTARALDEHYDE INACTIVATED), BORDETELLA PERTUSSIS FILAMENTOUS HEMAGGLUTININ ANTIGEN (FORMALDEHYDE INACTIVATED), BORDETELLA PERTUSSIS PERTACTIN ANTIGEN, AND BORDETELLA PERTUSSIS FIMBRIAE 2/3 ANTIGEN 0.5 ML: 5; 2; 2.5; 5; 3; 5 INJECTION, SUSPENSION INTRAMUSCULAR at 20:00

## 2024-01-16 RX ADMIN — CEFTRIAXONE SODIUM 2000 MG: 2 INJECTION, POWDER, FOR SOLUTION INTRAMUSCULAR; INTRAVENOUS at 20:00

## 2024-01-16 RX ADMIN — VANCOMYCIN HYDROCHLORIDE 1250 MG: 1 INJECTION, POWDER, LYOPHILIZED, FOR SOLUTION INTRAVENOUS at 20:10

## 2024-01-16 RX ADMIN — SODIUM CHLORIDE: 9 INJECTION, SOLUTION INTRAVENOUS at 23:00

## 2024-01-16 RX ADMIN — LOVASTATIN 10 MG: 20 TABLET ORAL at 23:05

## 2024-01-16 ASSESSMENT — LIFESTYLE VARIABLES
TOTAL SCORE: 0
HOW MANY TIMES IN THE PAST YEAR HAVE YOU HAD 5 OR MORE DRINKS IN A DAY: 0
TOTAL SCORE: 0
HAVE PEOPLE ANNOYED YOU BY CRITICIZING YOUR DRINKING: NO
EVER HAD A DRINK FIRST THING IN THE MORNING TO STEADY YOUR NERVES TO GET RID OF A HANGOVER: NO
EVER FELT BAD OR GUILTY ABOUT YOUR DRINKING: NO
HAVE YOU EVER FELT YOU SHOULD CUT DOWN ON YOUR DRINKING: NO
AVERAGE NUMBER OF DAYS PER WEEK YOU HAVE A DRINK CONTAINING ALCOHOL: 0
ALCOHOL_USE: NO
CONSUMPTION TOTAL: NEGATIVE
ON A TYPICAL DAY WHEN YOU DRINK ALCOHOL HOW MANY DRINKS DO YOU HAVE: 0
TOTAL SCORE: 0

## 2024-01-16 ASSESSMENT — ENCOUNTER SYMPTOMS
DEPRESSION: 0
NAUSEA: 0
DIZZINESS: 0
LOSS OF CONSCIOUSNESS: 0
VOMITING: 0
COUGH: 0
CONSTIPATION: 0
SPUTUM PRODUCTION: 0
WEAKNESS: 0
STRIDOR: 0
TINGLING: 0
MYALGIAS: 0
PALPITATIONS: 0
FALLS: 0
DIARRHEA: 0
FEVER: 0
CHILLS: 0
ABDOMINAL PAIN: 0
HEADACHES: 0
SHORTNESS OF BREATH: 0

## 2024-01-16 ASSESSMENT — COGNITIVE AND FUNCTIONAL STATUS - GENERAL
MOBILITY SCORE: 24
SUGGESTED CMS G CODE MODIFIER DAILY ACTIVITY: CH
SUGGESTED CMS G CODE MODIFIER MOBILITY: CH
DAILY ACTIVITIY SCORE: 24

## 2024-01-16 ASSESSMENT — PAIN DESCRIPTION - PAIN TYPE: TYPE: ACUTE PAIN

## 2024-01-16 ASSESSMENT — PATIENT HEALTH QUESTIONNAIRE - PHQ9
SUM OF ALL RESPONSES TO PHQ9 QUESTIONS 1 AND 2: 0
1. LITTLE INTEREST OR PLEASURE IN DOING THINGS: NOT AT ALL
2. FEELING DOWN, DEPRESSED, IRRITABLE, OR HOPELESS: NOT AT ALL

## 2024-01-16 ASSESSMENT — FIBROSIS 4 INDEX
FIB4 SCORE: 2.94
FIB4 SCORE: 3.55

## 2024-01-17 ENCOUNTER — APPOINTMENT (OUTPATIENT)
Dept: RADIOLOGY | Facility: MEDICAL CENTER | Age: 89
DRG: 603 | End: 2024-01-17
Attending: INTERNAL MEDICINE
Payer: MEDICARE

## 2024-01-17 PROBLEM — E87.1 HYPONATREMIA: Status: ACTIVE | Noted: 2024-01-17

## 2024-01-17 PROBLEM — E87.6 HYPOKALEMIA: Status: ACTIVE | Noted: 2024-01-17

## 2024-01-17 PROBLEM — R73.9 HYPERGLYCEMIA: Status: ACTIVE | Noted: 2024-01-17

## 2024-01-17 LAB
ANION GAP SERPL CALC-SCNC: 12 MMOL/L (ref 7–16)
BUN SERPL-MCNC: 20 MG/DL (ref 8–22)
CALCIUM SERPL-MCNC: 8.9 MG/DL (ref 8.4–10.2)
CHLORIDE SERPL-SCNC: 98 MMOL/L (ref 96–112)
CO2 SERPL-SCNC: 26 MMOL/L (ref 20–33)
CREAT SERPL-MCNC: 0.88 MG/DL (ref 0.5–1.4)
CRP SERPL HS-MCNC: <0.3 MG/DL (ref 0–0.75)
ERYTHROCYTE [DISTWIDTH] IN BLOOD BY AUTOMATED COUNT: 46.8 FL (ref 35.9–50)
ERYTHROCYTE [SEDIMENTATION RATE] IN BLOOD BY WESTERGREN METHOD: 5 MM/HOUR (ref 0–25)
GFR SERPLBLD CREATININE-BSD FMLA CKD-EPI: 62 ML/MIN/1.73 M 2
GLUCOSE SERPL-MCNC: 95 MG/DL (ref 65–99)
GRAM STN SPEC: NORMAL
HCT VFR BLD AUTO: 43.9 % (ref 37–47)
HGB BLD-MCNC: 14.7 G/DL (ref 12–16)
MCH RBC QN AUTO: 30.8 PG (ref 27–33)
MCHC RBC AUTO-ENTMCNC: 33.5 G/DL (ref 32.2–35.5)
MCV RBC AUTO: 91.8 FL (ref 81.4–97.8)
PLATELET # BLD AUTO: 195 K/UL (ref 164–446)
PMV BLD AUTO: 10 FL (ref 9–12.9)
POTASSIUM SERPL-SCNC: 3.3 MMOL/L (ref 3.6–5.5)
RBC # BLD AUTO: 4.78 M/UL (ref 4.2–5.4)
SIGNIFICANT IND 70042: NORMAL
SITE SITE: NORMAL
SODIUM SERPL-SCNC: 136 MMOL/L (ref 135–145)
SOURCE SOURCE: NORMAL
WBC # BLD AUTO: 6.5 K/UL (ref 4.8–10.8)

## 2024-01-17 PROCEDURE — 700111 HCHG RX REV CODE 636 W/ 250 OVERRIDE (IP): Performed by: INTERNAL MEDICINE

## 2024-01-17 PROCEDURE — 94760 N-INVAS EAR/PLS OXIMETRY 1: CPT

## 2024-01-17 PROCEDURE — 770001 HCHG ROOM/CARE - MED/SURG/GYN PRIV*

## 2024-01-17 PROCEDURE — 97602 WOUND(S) CARE NON-SELECTIVE: CPT

## 2024-01-17 PROCEDURE — 94640 AIRWAY INHALATION TREATMENT: CPT

## 2024-01-17 PROCEDURE — A9270 NON-COVERED ITEM OR SERVICE: HCPCS | Performed by: INTERNAL MEDICINE

## 2024-01-17 PROCEDURE — 700105 HCHG RX REV CODE 258: Performed by: INTERNAL MEDICINE

## 2024-01-17 PROCEDURE — A9270 NON-COVERED ITEM OR SERVICE: HCPCS | Mod: JZ | Performed by: INTERNAL MEDICINE

## 2024-01-17 PROCEDURE — 700102 HCHG RX REV CODE 250 W/ 637 OVERRIDE(OP): Performed by: INTERNAL MEDICINE

## 2024-01-17 PROCEDURE — 85652 RBC SED RATE AUTOMATED: CPT

## 2024-01-17 PROCEDURE — 99233 SBSQ HOSP IP/OBS HIGH 50: CPT | Performed by: INTERNAL MEDICINE

## 2024-01-17 PROCEDURE — 700102 HCHG RX REV CODE 250 W/ 637 OVERRIDE(OP): Mod: JZ | Performed by: INTERNAL MEDICINE

## 2024-01-17 PROCEDURE — 36415 COLL VENOUS BLD VENIPUNCTURE: CPT

## 2024-01-17 PROCEDURE — 73718 MRI LOWER EXTREMITY W/O DYE: CPT | Mod: LT

## 2024-01-17 PROCEDURE — 80048 BASIC METABOLIC PNL TOTAL CA: CPT

## 2024-01-17 PROCEDURE — 86140 C-REACTIVE PROTEIN: CPT

## 2024-01-17 PROCEDURE — 85027 COMPLETE CBC AUTOMATED: CPT

## 2024-01-17 RX ORDER — CLINDAMYCIN HYDROCHLORIDE 150 MG/1
150 CAPSULE ORAL 4 TIMES DAILY
Status: ON HOLD | COMMUNITY
End: 2024-01-18

## 2024-01-17 RX ORDER — DENOSUMAB 60 MG/ML
60 INJECTION SUBCUTANEOUS
COMMUNITY

## 2024-01-17 RX ORDER — HYDROCHLOROTHIAZIDE 25 MG/1
25 TABLET ORAL DAILY
Status: DISCONTINUED | OUTPATIENT
Start: 2024-01-17 | End: 2024-01-18 | Stop reason: HOSPADM

## 2024-01-17 RX ORDER — POTASSIUM CHLORIDE 20 MEQ/1
40 TABLET, EXTENDED RELEASE ORAL ONCE
Status: COMPLETED | OUTPATIENT
Start: 2024-01-17 | End: 2024-01-17

## 2024-01-17 RX ORDER — HEPARIN SODIUM 5000 [USP'U]/ML
5000 INJECTION, SOLUTION INTRAVENOUS; SUBCUTANEOUS EVERY 8 HOURS
Status: DISCONTINUED | OUTPATIENT
Start: 2024-01-17 | End: 2024-01-18 | Stop reason: HOSPADM

## 2024-01-17 RX ORDER — LABETALOL HYDROCHLORIDE 5 MG/ML
10 INJECTION, SOLUTION INTRAVENOUS EVERY 4 HOURS PRN
Status: DISCONTINUED | OUTPATIENT
Start: 2024-01-17 | End: 2024-01-18 | Stop reason: HOSPADM

## 2024-01-17 RX ADMIN — CEFAZOLIN 1 G: 1 INJECTION, POWDER, FOR SOLUTION INTRAMUSCULAR; INTRAVENOUS at 05:26

## 2024-01-17 RX ADMIN — AMLODIPINE BESYLATE 10 MG: 5 TABLET ORAL at 05:24

## 2024-01-17 RX ADMIN — CEFAZOLIN 1 G: 1 INJECTION, POWDER, FOR SOLUTION INTRAMUSCULAR; INTRAVENOUS at 17:48

## 2024-01-17 RX ADMIN — BENAZEPRIL HYDROCHLORIDE 40 MG: 10 TABLET ORAL at 05:24

## 2024-01-17 RX ADMIN — LOVASTATIN 10 MG: 20 TABLET ORAL at 21:15

## 2024-01-17 RX ADMIN — POTASSIUM CHLORIDE 40 MEQ: 1500 TABLET, EXTENDED RELEASE ORAL at 13:15

## 2024-01-17 RX ADMIN — MOMETASONE FUROATE AND FORMOTEROL FUMARATE DIHYDRATE 2 PUFF: 100; 5 AEROSOL RESPIRATORY (INHALATION) at 19:50

## 2024-01-17 RX ADMIN — LATANOPROST 1 DROP: 50 SOLUTION OPHTHALMIC at 21:15

## 2024-01-17 ASSESSMENT — ENCOUNTER SYMPTOMS
HEARTBURN: 0
NERVOUS/ANXIOUS: 0
DIZZINESS: 0
HEADACHES: 0
BACK PAIN: 0
FALLS: 0
COUGH: 0
DOUBLE VISION: 0
CHILLS: 0
FEVER: 0
VOMITING: 0
PALPITATIONS: 0
SHORTNESS OF BREATH: 0
BLURRED VISION: 0
ABDOMINAL PAIN: 0

## 2024-01-17 ASSESSMENT — FIBROSIS 4 INDEX: FIB4 SCORE: 3.55

## 2024-01-17 ASSESSMENT — PAIN DESCRIPTION - PAIN TYPE
TYPE: ACUTE PAIN
TYPE: ACUTE PAIN

## 2024-01-17 ASSESSMENT — LIFESTYLE VARIABLES: SUBSTANCE_ABUSE: 0

## 2024-01-17 NOTE — PROGRESS NOTES
4 Eyes Skin Assessment Completed by BRAD Lovett and BRAD Quiñonez.    Head WDL  Ears WDL  Nose WDL  Mouth WDL  Neck WDL  Breast/Chest WDL  Shoulder Blades WDL  Spine WDL  (R) Arm/Elbow/Hand WDL  (L) Arm/Elbow/Hand WDL  Abdomen WDL  Groin WDL  Scrotum/Coccyx/Buttocks Redness and Blanching  (R) Leg Redness and Shiny    (L) Leg    (R) Heel/Foot/Toe Redness  (L) Heel/Foot/Toe Redness          Devices In Places Pulse Ox      Interventions In Place Pillows    Possible Skin Injury Yes    Pictures Uploaded Into Epic Yes  Wound Consult Placed Yes  RN Wound Prevention Protocol Ordered Yes

## 2024-01-17 NOTE — PROGRESS NOTES
Pt arrived via gurney, admitted to room 218 from ED at 2240. Pt is A&Ox4, c/o pain reported at 0 on a scale of 0-10. Oriented to room call light and smoking policy. Reviewed plan of care with the patient and the family. Fall precaution in place. Bed locked. Bet at lowest position. Call light within reach. Encouraged pt the importance to call for assistance. Continue to monitor. Hourly rounding in progress

## 2024-01-17 NOTE — ED TRIAGE NOTES
"Chief Complaint   Patient presents with    Laceration     Caught leg under car door on 01/08/24    Sent from Urgent Care     Dx w/ Osteomyelitis of Tibia at  today     BP (!) 166/102   Pulse 75   Temp 36.6 °C (97.8 °F) (Temporal)   Resp 17   Ht 1.676 m (5' 6\")   Wt 45 kg (99 lb 3.3 oz)   SpO2 92%   BMI 16.01 kg/m²     Pt ambulated to ED w/ visitor for c/o ongoing LLE pain, redness and swelling s/p skin tear after slipping on ice and catching leg on car door.  Large open skin tear noted to LLE w/ increased swelling and redness.  Visitor states pt was dx w/ Osteomyelitis today at  after repeat follow up.   "

## 2024-01-17 NOTE — H&P
Hospital Medicine History & Physical Note    Date of Service  1/16/2024    Primary Care Physician  Mariya Bhardwaj M.D.    Consultants  None    Specialist Names: None    Code Status  DNAR/DNI    Chief Complaint  Chief Complaint   Patient presents with    Laceration     Caught leg under car door on 01/08/24    Sent from Urgent Care     Dx w/ Osteomyelitis of Tibia at  today       History of Presenting Illness  Diana Tristan is a 89 y.o. female who presented 1/16/2024 with worsening cellulitis.  Patient states approximately 1 week ago, she hit her leg on the car door causing a significant skin tear.  Patient states it became red, went to see her physician on the 11th.  At that time she was started on clindamycin.  Patient continued to have worsening erythema so she presented to urgent care.  At urgent care, they did an x-ray, the read was possible osteomyelitis so she was sent to the emergency department.  I did discuss the case including labs and imaging with the ER physician.    I discussed the plan of care with patient and family.    Review of Systems  Review of Systems   Constitutional:  Negative for chills, fever and malaise/fatigue.   HENT:  Negative for congestion.    Respiratory:  Negative for cough, sputum production, shortness of breath and stridor.    Cardiovascular:  Negative for chest pain, palpitations and leg swelling.   Gastrointestinal:  Negative for abdominal pain, constipation, diarrhea, nausea and vomiting.   Genitourinary:  Negative for dysuria and urgency.   Musculoskeletal:  Negative for falls and myalgias.   Skin:  Positive for rash (left leg, associated with skin tear).   Neurological:  Negative for dizziness, tingling, loss of consciousness, weakness and headaches.   Psychiatric/Behavioral:  Negative for depression and suicidal ideas.    All other systems reviewed and are negative.      Past Medical History   has a past medical history of Hypertension and UTI (urinary tract  infection).    Surgical History   has a past surgical history that includes US-CYST ASPIRATION-BREAST INITIAL.     Family History  family history is not on file.   Family history reviewed with patient. There is no family history that is pertinent to the chief complaint.     Social History   reports that she quit smoking about 8 years ago. She has never used smokeless tobacco. She reports that she does not drink alcohol and does not use drugs.    Allergies  Allergies   Allergen Reactions    Amoxicillin-Pot Clavulanate Hives    Diclofenac Swelling    Sulfamethoxazole-Trimethoprim Hives    Codeine Vomiting and Nausea       Medications  Prior to Admission Medications   Prescriptions Last Dose Informant Patient Reported? Taking?   Calcium Carbonate (CALCIUM 500 PO)  Patient Yes No   Sig: Take 500 mg by mouth 2 times a day.   Cholecalciferol (VITAMIN D3) 1000 units Cap  Patient Yes No   Sig: Take 1,000 Units by mouth every morning.   Plant Sterols and Stanols (CHOLESTOFF PLUS PO)  Patient Yes No   Sig: Take 2 Capsules by mouth 2 times a day.   amLODIPine (NORVASC) 10 MG Tab   No No   Sig: Take 1 Tablet by mouth every day.   benazepril (LOTENSIN) 40 MG tablet  Patient No No   Sig: Take 1 Tab by mouth every day.   budesonide-formoterol (SYMBICORT) 80-4.5 MCG/ACT Aerosol   No No   Sig: Inhale 2 Puffs 2 times a day.   latanoprost (XALATAN) 0.005 % Solution  Patient Yes No   Sig: Administer 1 Drop into the left eye at bedtime.   lovastatin (MEVACOR) 10 MG tablet  Patient Yes No   Sig: Take 10 mg by mouth every evening.   risedronate (ACTONEL) 35 MG Tab  Patient Yes No   Sig: Take 35 mg by mouth every 7 days.      Facility-Administered Medications: None       Physical Exam  Temp:  [36.6 °C (97.8 °F)] 36.6 °C (97.8 °F)  Pulse:  [75] 75  Resp:  [13-17] 13  BP: (166-193)/() 193/90  SpO2:  [92 %] 92 %  Blood Pressure : (!) 193/90   Temperature: 36.6 °C (97.8 °F)   Pulse: 75   Respiration: 13   Pulse Oximetry: 92 %        Physical Exam  Vitals and nursing note reviewed.   Constitutional:       General: She is not in acute distress.     Appearance: She is well-developed. She is not diaphoretic.   HENT:      Head: Normocephalic and atraumatic.      Right Ear: External ear normal.      Left Ear: External ear normal.      Nose: Nose normal. No congestion or rhinorrhea.      Mouth/Throat:      Mouth: Mucous membranes are dry.      Pharynx: No oropharyngeal exudate.   Eyes:      General:         Right eye: No discharge.         Left eye: No discharge.   Neck:      Trachea: No tracheal deviation.   Cardiovascular:      Rate and Rhythm: Normal rate and regular rhythm.      Heart sounds: No murmur heard.     No friction rub. No gallop.   Pulmonary:      Effort: Pulmonary effort is normal. No respiratory distress.      Breath sounds: Normal breath sounds. No stridor. No wheezing or rales.   Chest:      Chest wall: No tenderness.   Abdominal:      General: Bowel sounds are normal. There is no distension.      Palpations: Abdomen is soft.      Tenderness: There is no abdominal tenderness.   Musculoskeletal:         General: No tenderness. Normal range of motion.      Cervical back: Neck supple.      Right lower leg: No edema.      Left lower leg: No edema.   Lymphadenopathy:      Cervical: No cervical adenopathy.   Skin:     General: Skin is warm and dry.      Findings: Erythema present.      Comments: Large left shin skin tear with surrounding erythema, warm to touch   Neurological:      Mental Status: She is alert and oriented to person, place, and time.      Cranial Nerves: No cranial nerve deficit.   Psychiatric:         Mood and Affect: Mood normal.         Behavior: Behavior normal.         Thought Content: Thought content normal.         Judgment: Judgment normal.         Laboratory:  Recent Labs     01/16/24  1818   WBC 6.5   RBC 5.09   HEMOGLOBIN 15.4   HEMATOCRIT 46.3   MCV 91.0   MCH 30.3   MCHC 33.3   RDW 45.8   PLATELETCT 211  "  MPV 9.3     Recent Labs     01/16/24  1818   SODIUM 133*   POTASSIUM 4.1   CHLORIDE 95*   CO2 26   GLUCOSE 110*   BUN 24*   CREATININE 0.97   CALCIUM 9.9     Recent Labs     01/16/24  1818   ALTSGPT 9   ASTSGOT 25   ALKPHOSPHAT 46   TBILIRUBIN 0.5   GLUCOSE 110*         No results for input(s): \"NTPROBNP\" in the last 72 hours.      No results for input(s): \"TROPONINT\" in the last 72 hours.    Imaging:  DX-TIBIA AND FIBULA LEFT   Final Result      1.  No fracture of LEFT lower leg.   2.  Anterior soft tissue swelling and irregularity.   3.  Probable varicose veins medially.          X-Ray:  I have personally reviewed the images and compared with prior images.    Assessment/Plan:  Justification for Admission Status  I anticipate this patient will require at least two midnights for appropriate medical management, necessitating inpatient admission because cellulitis with outpatient antibiotic failure    Patient will need a Med/Surg bed on MEDICAL service .  The need is secondary to cellulitis.    * Cellulitis- (present on admission)  Assessment & Plan  Significant cellulitis of her left leg, centered around a large skin tear  The wound is not particularly deep or old, no signs to suggest osteomyelitis on current x-ray, at this point in time I do not feel she has osteomyelitis  Will start Ancef  Await culture results  Obtain wound care  If patient continues to worsen even with IV antibiotics, may warrant MRI but I do not think that is needed at this time  Not causing sepsis    Hyperglycemia- (present on admission)  Assessment & Plan  Mild, no need for coverage    Hyponatremia- (present on admission)  Assessment & Plan  Mild, due to dehydration  Start IV fluids  Repeat BMP in the morning    Primary hypertension- (present on admission)  Assessment & Plan  Continue home amlodipine and benazepril  Start as needed labetalol  Adjust as needed    COPD (chronic obstructive pulmonary disease) (HCC)- (present on " admission)  Assessment & Plan  No acute exacerbation    Hyperlipidemia- (present on admission)  Assessment & Plan  Continue home statin        VTE prophylaxis: SCDs/TEDs and Xarelto 10 mg daily as prophylaxis

## 2024-01-17 NOTE — ASSESSMENT & PLAN NOTE
Significant cellulitis of her left leg, centered around a large skin tear  The wound is not particularly deep or old, no signs to suggest osteomyelitis on current x-ray, at this point in time I do not feel she has osteomyelitis  Will start Ancef  Await culture results  Obtain wound care  If patient continues to worsen even with IV antibiotics, may warrant MRI but I do not think that is needed at this time  Not causing sepsis    1/17: I do not suspect osteomyelitis at this time, however patient is concern as this was told to her at the urgent care. We will order MRI to rule out osteomyelitis, however I do not suspect it.

## 2024-01-17 NOTE — ED PROVIDER NOTES
ED Provider Note    CHIEF COMPLAINT  Chief Complaint   Patient presents with    Laceration     Caught leg under car door on 01/08/24    Sent from Urgent Care     Dx w/ Osteomyelitis of Tibia at  today       EXTERNAL RECORDS REVIEWED  Inpatient Notes reviewed discharge summary dated January 1, 2022 by Dr. Adamson.  Patient seen for ground-level fall, admitted on December 28.  Patient was found to be hypoxic and required supplemental oxygen.  History of COPD, patient did better with DuoNebs written for outpatient oxygen until able to wean to room air, able to be discharged on January 1, 2022    HPI/ROS  LIMITATION TO HISTORY   Select: : None  OUTSIDE HISTORIAN(S):  Family patient's daughter at bedside notes initial wound on the eighth, started on clindamycin on the 10th for concern of infection.  Followed up with urgent care today and there was concern for osteomyelitis as such they were sent to the emergency department.    Diana Tristan is a 89 y.o. female who presents for evaluation of wound to the left leg.  Patient has had a fever in the outpatient setting with temperature of 101.  Initial wound on the eighth, found to have redness and swelling and diagnosed with cellulitis on the 10th and started on clindamycin.  Patient endorses generalized fatigue as well but no nausea, no vomiting.  She is able to ambulate.  Unknown last tetanus vaccination, not anticoagulated, not a diabetic.  Swelling and redness getting worse despite clindamycin so patient was reassessed at the urgent care today, they relate imaging done at the urgent care was concerning for possible osteomyelitis so she was sent to the emergency department to be evaluated further.    PAST MEDICAL HISTORY   has a past medical history of Hypertension and UTI (urinary tract infection).    SURGICAL HISTORY   has a past surgical history that includes US-CYST ASPIRATION-BREAST INITIAL.    FAMILY HISTORY  Noncontributory acutely    SOCIAL HISTORY  Social  "History     Tobacco Use    Smoking status: Former     Current packs/day: 0.00     Types: Cigarettes     Quit date: 2015     Years since quittin.9    Smokeless tobacco: Never   Substance and Sexual Activity    Alcohol use: No    Drug use: No    Sexual activity: Not on file       CURRENT MEDICATIONS  Home Medications    **Home medications have not yet been reviewed for this encounter**         ALLERGIES  Allergies   Allergen Reactions    Amoxicillin-Pot Clavulanate Hives    Diclofenac Swelling    Sulfamethoxazole-Trimethoprim Hives    Codeine Vomiting and Nausea       PHYSICAL EXAM  VITAL SIGNS: BP (!) 193/90   Pulse 75   Temp 36.6 °C (97.8 °F) (Temporal)   Resp 13   Ht 1.676 m (5' 6\")   Wt 45 kg (99 lb 3.3 oz)   SpO2 92%   BMI 16.01 kg/m²    General: Alert, no acute distress  Skin: Warm, dry, normal for ethnicity  Head: Normocephalic, atraumatic  Neck: Trachea midline, no tenderness  Eye: PERRL, normal conjunctiva  ENMT: Oral mucosa mildly dry  Cardiovascular: Regular rate and rhythm, No murmur, Normal peripheral perfusion  Respiratory: Lungs CTA, respirations are non-labored, breath sounds are equal  Musculoskeletal: Erythema and induration involving the anterior and lateral aspect of the left lower leg, there is a 10 cm curvilinear open wound without exudate to the anterior tibial region.  No proximal streaking, no fluctuance, no crepitus.  Neurological: Alert and oriented to person, place, time, and situation  Lymphatics: No cervical lymphadenopathy  Psychiatric: Cooperative, appropriate mood & affect     DIAGNOSTIC STUDIES / PROCEDURES      LABS  Results for orders placed or performed during the hospital encounter of 24   Lactic acid (lactate)   Result Value Ref Range    Lactic Acid 0.9 0.5 - 2.0 mmol/L   CBC With Differential   Result Value Ref Range    WBC 6.5 4.8 - 10.8 K/uL    RBC 5.09 4.20 - 5.40 M/uL    Hemoglobin 15.4 12.0 - 16.0 g/dL    Hematocrit 46.3 37.0 - 47.0 %    MCV 91.0 81.4 " - 97.8 fL    MCH 30.3 27.0 - 33.0 pg    MCHC 33.3 32.2 - 35.5 g/dL    RDW 45.8 35.9 - 50.0 fL    Platelet Count 211 164 - 446 K/uL    MPV 9.3 9.0 - 12.9 fL    Neutrophils-Polys 59.90 44.00 - 72.00 %    Lymphocytes 24.70 22.00 - 41.00 %    Monocytes 12.20 0.00 - 13.40 %    Eosinophils 2.60 0.00 - 6.90 %    Basophils 0.30 0.00 - 1.80 %    Immature Granulocytes 0.30 0.00 - 0.90 %    Nucleated RBC 0.00 0.00 - 0.20 /100 WBC    Neutrophils (Absolute) 3.86 1.82 - 7.42 K/uL    Lymphs (Absolute) 1.59 1.00 - 4.80 K/uL    Monos (Absolute) 0.79 0.00 - 0.85 K/uL    Eos (Absolute) 0.17 0.00 - 0.51 K/uL    Baso (Absolute) 0.02 0.00 - 0.12 K/uL    Immature Granulocytes (abs) 0.02 0.00 - 0.11 K/uL    NRBC (Absolute) 0.00 K/uL   Comp Metabolic Panel   Result Value Ref Range    Sodium 133 (L) 135 - 145 mmol/L    Potassium 4.1 3.6 - 5.5 mmol/L    Chloride 95 (L) 96 - 112 mmol/L    Co2 26 20 - 33 mmol/L    Anion Gap 12.0 7.0 - 16.0    Glucose 110 (H) 65 - 99 mg/dL    Bun 24 (H) 8 - 22 mg/dL    Creatinine 0.97 0.50 - 1.40 mg/dL    Calcium 9.9 8.4 - 10.2 mg/dL    Correct Calcium 9.7 8.5 - 10.5 mg/dL    AST(SGOT) 25 12 - 45 U/L    ALT(SGPT) 9 2 - 50 U/L    Alkaline Phosphatase 46 30 - 99 U/L    Total Bilirubin 0.5 0.1 - 1.5 mg/dL    Albumin 4.3 3.2 - 4.9 g/dL    Total Protein 7.0 6.0 - 8.2 g/dL    Globulin 2.7 1.9 - 3.5 g/dL    A-G Ratio 1.6 g/dL   ESTIMATED GFR   Result Value Ref Range    GFR (CKD-EPI) 55 (A) >60 mL/min/1.73 m 2   Sed Rate   Result Value Ref Range    Sed Rate Westergren 4 0 - 25 mm/hour   CRP QUANTITIVE (NON-CARDIAC)   Result Value Ref Range    Stat C-Reactive Protein <0.30 0.00 - 0.75 mg/dL        RADIOLOGY  I have independently interpreted the diagnostic imaging associated with this visit and am waiting the final reading from the radiologist.   My preliminary interpretation is as follows: No fracture or dislocation  Radiologist interpretation:   DX-TIBIA AND FIBULA LEFT   Final Result      1.  No fracture of LEFT lower  leg.   2.  Anterior soft tissue swelling and irregularity.   3.  Probable varicose veins medially.           COURSE & MEDICAL DECISION MAKING    ED Observation Status? Yes; I am placing the patient in to an observation status due to a diagnostic uncertainty as well as therapeutic intensity. Patient placed in observation status at 7:30 PM, 1/16/2024.     Observation plan is as follows: Patient medicated with vancomycin pharmacy to dose, Rocephin 2 g IV.  Have ordered tetanus booster.  Septic workup will be obtained as well as inflammatory markers.  I have ordered x-ray imaging of the left hip/fib.  Differential diagnosis at this point includes but is not restricted to sepsis, osteomyelitis, cellulitis, failure of outpatient management    2043: X-ray is reassuring, no evidence at this time of osteomyelitis.  Patient said given concern for this on previous imaging and given significant infection that is failed appropriate outpatient management I do feel the patient benefit from further evaluation.  do feel the patient would benefit from inpatient management given failure of appropriate antibiotics.  IV antibiotics as well given failure of appropriate oral antibiotics.  Thankfully no evidence of sepsis.  Have paged hospitalist for admission.    2119: Spoke with Dr. Laurent who concurs with plan of care, accepts patient to his service to the medical surgical floor.    Upon Reevaluation, the patient's condition has: not improved; and will be escalated to hospitalization.    Patient discharged from ED Observation status at 2130 (Time) 1/16/24 (Date).     INITIAL ASSESSMENT, COURSE AND PLAN  Care Narrative: This patient is a very pleasant 89-year-old female with history of hypertension who presents for evaluation of a nonhealing wound to the left lower leg.  History and exam as above.  Patient has been on oral clindamycin and nonetheless has had fever at home and wound is worsening.  On exam she has significant induration  and erythema and palpable warmth all concerning for cellulitis.  Reassuringly she is not febrile nor tachycardic nor hypotensive; no leukocytosis, no lactic acidosis.  This is thusly not consistent with septicemia per SIRS criteria.  X-rays thankfully reassuring; patient apparently had an abnormal x-ray at outlying facility which unfortunate cannot access today showing concern for possible osteomyelitis.  Given the exam findings this is certainly on the differential.  X-ray in the ED thankfully does not demonstrate clementina evidence of this but certainly cannot rule out the diagnosis.  Given failure of outpatient management she was treated with Rocephin and vancomycin for possible osteomyelitis.  Plan is for admission.  HTN/IDDM FOLLOW UP:  The patient has known hypertension and is being followed by their primary care doctor      ADDITIONAL PROBLEM LIST  Infected wound to the left lower leg  DISPOSITION AND DISCUSSIONS  I have discussed management of the patient with the following physicians and SARAI's:  Consulted with Dr. Laurent who concurs with plan of care, accepts patient to his service to the medical surgical floor.    Discussion of management with other [facility] or appropriate source(s): None     Escalation of care considered, and ultimately not performed:IV fluids    Barriers to care at this time, including but not limited to:  NA .     Decision tools and prescription drugs considered including, but not limited to:  SIRS criteria for sepsis not met .    FINAL DIAGNOSIS  1. Cellulitis of left lower leg    2. Failure of outpatient treatment           Electronically signed by: Rosie Saucedo M.D., 1/16/2024 7:29 PM

## 2024-01-17 NOTE — DIETARY
"Nutrition services: Day 1 of admit.  Diana Tristan is a 89 y.o. female with admitting DX of Cellulitis [L03.90]     Consult received for BMI<19. RD visited pt at bedside. Pt states  lb, denies any changes in PO intake or wt over the past year. RD offered nutrition supplements or snacks to increase pt's kcal/protein intake to support increased needs with wound healing. Pt states has tried ONS in the past and did not enjoy these; agreeable to yogurt w/ meals.    Assessment:  Height: 167.6 cm (5' 6\")  Weight: 45.5 kg (100 lb 5 oz)  Body mass index is 16.19 kg/m²., BMI classification: Underweight  Diet/Intake: Regular; 50-75% x 1 meal + 25-50% x 1 snack    Evaluation:   PMHx HTN. Admitted for leg laceration (\"caught leg under car door on 01/08/24\"), dx osteomyelitis of tibia. Dx list includes cellulitis, COPD, hyperglycemia, HLD, hypokalemia, hyponatremia.  Labs: K+ 3.3  MAR: HCTZ (held), NS, Kdur (completed)  Wound team consult pending.  Last BM: 1/17, type 6  Nutrition-focused physical exam: severe muscle wasting: clavicle, deltoid; moderate temporalis wasting. Overt fat wasting not noted.  Limited chart wt hx available for past 12 months; long-term wt hx appears consistent w/ current wt:  01/17/24 45.5 kg (100 lb 5 oz)  01/03/22 44.7 kg (98 lb 8 oz)  12/29/21 43 kg (94 lb 12.8 oz)  06/04/20 45 kg (99 lb 3.3 oz)  08/14/19 46 kg (101 lb 6.6 oz)    Malnutrition Risk: Unable to determine - muscle wasting may be r/t age-related losses.    Recommendations/Plan:  Provide TID Greek/chobani yogurts w/ meals to bolster kcal/protein intake w/ wound/low BMI.   Encourage intake of meals >50-75%.  Document intake of all PO as % taken in ADL's to provide interdisciplinary communication across all shifts.   Monitor weight.  Nutrition rep will continue to see patient for ongoing meal and snack preferences.     RD following.  "

## 2024-01-17 NOTE — CARE PLAN
The patient is Stable - Low risk of patient condition declining or worsening    Shift Goals  Clinical Goals: monitor BP, wound swab  Patient Goals: sleep at least 8 hours    Progress made toward(s) clinical / shift goals:  BP kept monitored during this shift. Pt is doing her daily dressing since 1/9/24, wound kept open to air. Call light within reach. Fall precautions in placed. Hourly rounding and plan of care in progress.    Patient is not progressing towards the following goals:N/A

## 2024-01-17 NOTE — PROGRESS NOTES
Bedside report received from night RN. Assumed care of patient. Daily plan of care discussed. Pt awake and alert, denies complaints or concerns at this time. Pt reports she sent her hearing aids home with family yesterday. 12 hour chart check complete. Hourly rounding in place.

## 2024-01-17 NOTE — ASSESSMENT & PLAN NOTE
Continue home amlodipine and benazepril  Start as needed labetalol  Adjust as needed    1/17: We have restarted the patient's home dose of HTZD.

## 2024-01-17 NOTE — PROGRESS NOTES
"Hospital Medicine Daily Progress Note    Date of Service  1/17/2024    Chief Complaint  Diana Tristan is a 89 y.o. female admitted 1/16/2024 with laceration, concern for osteomyelitis.    Hospital Course  As per chart review:  \"89 y.o. female who presented 1/16/2024 with worsening cellulitis.  Patient states approximately 1 week ago, she hit her leg on the car door causing a significant skin tear.  Patient states it became red, went to see her physician on the 11th.  At that time she was started on clindamycin.  Patient continued to have worsening erythema so she presented to urgent care.  At urgent care, they did an x-ray, the read was possible osteomyelitis so she was sent to the emergency department.\"    Interval Problem Update  1/17: Patient seen at bedside this morning.  I do not suspect the patient has osteomyelitis, however the patient is concerned.  We have ordered MRI.  Continue antibiotics for now.    I have discussed this patient's plan of care and discharge plan at IDT rounds today with Case Management, Nursing, Nursing leadership, and other members of the IDT team.    Consultants/Specialty  None for now    Code Status  DNAR/DNI    Disposition  The patient is not medically cleared for discharge to home or a post-acute facility.      I have placed the appropriate orders for post-discharge needs.    Review of Systems  Review of Systems   Constitutional:  Positive for malaise/fatigue. Negative for chills and fever.   HENT:  Negative for hearing loss and nosebleeds.    Eyes:  Negative for blurred vision and double vision.   Respiratory:  Negative for cough and shortness of breath.    Cardiovascular:  Negative for chest pain and palpitations.   Gastrointestinal:  Negative for abdominal pain, heartburn and vomiting.   Genitourinary:  Negative for dysuria and urgency.   Musculoskeletal:  Negative for back pain and falls.   Skin:  Positive for rash. Negative for itching.   Neurological:  Negative for " dizziness and headaches.   Psychiatric/Behavioral:  Negative for substance abuse. The patient is not nervous/anxious.    All other systems reviewed and are negative.       Physical Exam  Temp:  [36.5 °C (97.7 °F)-36.6 °C (97.9 °F)] 36.5 °C (97.7 °F)  Pulse:  [61-75] 66  Resp:  [13-19] 18  BP: (165-218)/() 174/82  SpO2:  [90 %-92 %] 90 %    Physical Exam  Vitals and nursing note reviewed.   Constitutional:       General: She is not in acute distress.     Appearance: She is ill-appearing.      Comments: Elderly frail   HENT:      Head: Normocephalic and atraumatic.      Right Ear: External ear normal.      Left Ear: External ear normal.      Mouth/Throat:      Pharynx: No oropharyngeal exudate or posterior oropharyngeal erythema.   Eyes:      General:         Right eye: No discharge.         Left eye: No discharge.   Cardiovascular:      Rate and Rhythm: Normal rate and regular rhythm.      Pulses: Normal pulses.      Heart sounds: No murmur heard.     No gallop.   Pulmonary:      Effort: Pulmonary effort is normal. No respiratory distress.      Breath sounds: Normal breath sounds. No wheezing or rhonchi.   Abdominal:      General: Bowel sounds are normal. There is no distension.      Palpations: Abdomen is soft.      Tenderness: There is no abdominal tenderness. There is no guarding.   Musculoskeletal:         General: No swelling or tenderness. Normal range of motion.      Cervical back: Normal range of motion and neck supple. No tenderness.   Skin:     Findings: Bruising and erythema present.   Neurological:      General: No focal deficit present.      Mental Status: She is alert and oriented to person, place, and time. Mental status is at baseline.      Motor: No weakness.   Psychiatric:         Mood and Affect: Mood normal.         Behavior: Behavior normal.         Fluids    Intake/Output Summary (Last 24 hours) at 1/17/2024 1105  Last data filed at 1/17/2024 0854  Gross per 24 hour   Intake 340 ml    Output 850 ml   Net -510 ml       Laboratory  Recent Labs     01/16/24  1818 01/17/24  0108   WBC 6.5 6.5   RBC 5.09 4.78   HEMOGLOBIN 15.4 14.7   HEMATOCRIT 46.3 43.9   MCV 91.0 91.8   MCH 30.3 30.8   MCHC 33.3 33.5   RDW 45.8 46.8   PLATELETCT 211 195   MPV 9.3 10.0     Recent Labs     01/16/24 1818 01/17/24  0108   SODIUM 133* 136   POTASSIUM 4.1 3.3*   CHLORIDE 95* 98   CO2 26 26   GLUCOSE 110* 95   BUN 24* 20   CREATININE 0.97 0.88   CALCIUM 9.9 8.9                   Imaging  DX-TIBIA AND FIBULA LEFT   Final Result      1.  No fracture of LEFT lower leg.   2.  Anterior soft tissue swelling and irregularity.   3.  Probable varicose veins medially.      MR-LOWER EXTREMITY-NO JOINT-W/O LEFT    (Results Pending)        Assessment/Plan  * Cellulitis- (present on admission)  Assessment & Plan  Significant cellulitis of her left leg, centered around a large skin tear  The wound is not particularly deep or old, no signs to suggest osteomyelitis on current x-ray, at this point in time I do not feel she has osteomyelitis  Will start Ancef  Await culture results  Obtain wound care  If patient continues to worsen even with IV antibiotics, may warrant MRI but I do not think that is needed at this time  Not causing sepsis    1/17: I do not suspect osteomyelitis at this time, however patient is concern as this was told to her at the urgent care. We will order MRI to rule out osteomyelitis, however I do not suspect it.    Hypokalemia  Assessment & Plan  Replace as needed  monitor    Hyperglycemia- (present on admission)  Assessment & Plan  Mild, on admission  no need for coverage    Hyponatremia- (present on admission)  Assessment & Plan  Mild on admission  monitor    Primary hypertension- (present on admission)  Assessment & Plan  Continue home amlodipine and benazepril  Start as needed labetalol  Adjust as needed    1/17: We have restarted the patient's home dose of HTZD.    COPD (chronic obstructive pulmonary disease)  (HCC)- (present on admission)  Assessment & Plan  No acute exacerbation    Hyperlipidemia- (present on admission)  Assessment & Plan  Continue home statin         VTE prophylaxis: SCDs    I have performed a physical exam and reviewed and updated ROS and Plan today (1/17/2024). In review of yesterday's note (1/16/2024), there are no changes except as documented above.      I spend at least 51 minutes providing care for this patient.  This included face-to-face interview, physical examination.  Review of lab work including BMP, CBC.  Discussion with multidisciplinary team including case management, nursing staff and pharmacy.  Creating plan of care, reviewing orders.

## 2024-01-17 NOTE — RESPIRATORY CARE
"   COPD EDUCATION by COPD CLINICAL EDUCATOR  1/17/2024 at 3:47 PM by Karmen No RRT     Patient reviewed by COPD education team. Patient does not have a formal history or diagnosis of COPD and is a former smoker, but was surround by 2nd hand smoke.  Therefore, patient does not qualify for the COPD program and is not interested at this time.    COPD Screen  COPD Risk Screening  Do you have a history of COPD?: No (Pt denies having COPD was told but has no symptoms)    COPD Assessment  COPD Clinical Specialists ONLY  COPD Education Initiated: Yes--Short Intervention (No formal hx dx COPD, was surrounded by 2nd hand smoke since a child to adult til about 1981, pt actually maybe smoked for 1 yr when her 3 sons got her to quit. Pt is not interested in bedside PFT or Pulmonary, PCP takes good care of her.)  Is this a COPD exacerbation patient?: No  DME Company: none  Physician Name: KATLYN BARILLAS M.D.  Pulmonologist Name: no  Referrals Initiated: Yes  Pulmonary Rehab: Declined  Smoking Cessation: N/A  Hospice: N/A  Home Health Care: Yes (May need home health with wound on leg)  Mobile Urgent Care Services: N/A  Geriatric Specialty Group: N/A  Private In-Home Care Agency: N/A  Interdisciplinary Rounds: Attendance at Rounds (30 Min)    PFT Results    No results found for: \"PFT\"    Meds to Beds  [facility] provides bedside medication delivery for all eligible patients at discharge.  Would you like to opt out of this program for any reason?: Yes - Opt Out  Reason the patient would like to opt out of Bedside Medication Delivery at Discharge?: Patient prefers another pharmacy     MY COPD ACTION PLAN     It is recommended that patients and physicians /healthcare providers complete this action plan together. This plan should be discussed at each physician visit and updated as needed.    The green, yellow and red zones show groups of symptoms of COPD. This list of symptoms is not comprehensive, and you may experience other " "symptoms. In the \"Actions\" column, your healthcare provider has recommended actions for you to take based on your symptoms.    Patient Name: Diana Tristan   YOB: 1933   Last Updated on:     Green Zone:  I am doing well today Actions     Usual activitiy and exercise level   Take daily medications     Usual amounts of cough and phlegm/mucus   Use oxygen as prescribed     Sleep well at night   Continue regular exercise/diet plan     Appetite is good   At all times avoid cigarette smoke, inhaled irritants     Daily Medications (these medications are taken every day):                Yellow Zone:  I am having a bad day or a COPD flare Actions     More breathless than usual   Continue daily medications     I have less energy for my daily activities   Use quick relief inhaler as ordered     Increased or thicker phlegm/mucus   Use oxygen as prescribed     Using quick relief inhaler/nebulizer more often   Get plenty of rest     Swelling of ankles more than usual   Use pursed lip breathing     More coughing than usual   At all times avoid cigarette smoke, inhaled irritants     I feel like I have a \"chest cold\"     Poor sleep and my symptoms woke me up     My appetite is not good     My medicine is not helping      Call provider immediately if symptoms don’t improve     Continue daily medications, add rescue medications:               Medications to be used during a flare up, (as Discussed with Provider):              Red Zone:  I need urgent medical care Actions     Severe shortness of breath even at rest   Call 911 or seek medical care immediately     Not able to do any activity because of breathing      Fever or shaking chills      Feeling confused or very drowsy       Chest pains      Coughing up blood                  "

## 2024-01-17 NOTE — PROGRESS NOTES
Medication history reviewed with pt. Med rec is complete.  Allergies reviewed, per pt    Pt reports that she is not using her SYMBICORT 80-4.5MCG/ACT    Patient has had outpatient antibiotics in the last 30 days, pt started CLINDAMYCIN 150MG on 1/11/2024 for 10 day course last dose taken was on 1/16/2024 at 1200    Pt is not on any anticoagulants

## 2024-01-18 VITALS
TEMPERATURE: 98.9 F | SYSTOLIC BLOOD PRESSURE: 142 MMHG | WEIGHT: 100.31 LBS | HEIGHT: 66 IN | DIASTOLIC BLOOD PRESSURE: 87 MMHG | OXYGEN SATURATION: 90 % | BODY MASS INDEX: 16.12 KG/M2 | HEART RATE: 66 BPM | RESPIRATION RATE: 16 BRPM

## 2024-01-18 LAB
ANION GAP SERPL CALC-SCNC: 11 MMOL/L (ref 7–16)
BUN SERPL-MCNC: 23 MG/DL (ref 8–22)
CALCIUM SERPL-MCNC: 8.7 MG/DL (ref 8.4–10.2)
CHLORIDE SERPL-SCNC: 102 MMOL/L (ref 96–112)
CO2 SERPL-SCNC: 22 MMOL/L (ref 20–33)
CREAT SERPL-MCNC: 0.88 MG/DL (ref 0.5–1.4)
ERYTHROCYTE [DISTWIDTH] IN BLOOD BY AUTOMATED COUNT: 46.9 FL (ref 35.9–50)
GFR SERPLBLD CREATININE-BSD FMLA CKD-EPI: 62 ML/MIN/1.73 M 2
GLUCOSE SERPL-MCNC: 100 MG/DL (ref 65–99)
HCT VFR BLD AUTO: 44.2 % (ref 37–47)
HGB BLD-MCNC: 14.7 G/DL (ref 12–16)
MAGNESIUM SERPL-MCNC: 2 MG/DL (ref 1.5–2.5)
MCH RBC QN AUTO: 30.3 PG (ref 27–33)
MCHC RBC AUTO-ENTMCNC: 33.3 G/DL (ref 32.2–35.5)
MCV RBC AUTO: 91.1 FL (ref 81.4–97.8)
PLATELET # BLD AUTO: 206 K/UL (ref 164–446)
PMV BLD AUTO: 9.5 FL (ref 9–12.9)
POTASSIUM SERPL-SCNC: 4.4 MMOL/L (ref 3.6–5.5)
RBC # BLD AUTO: 4.85 M/UL (ref 4.2–5.4)
SODIUM SERPL-SCNC: 135 MMOL/L (ref 135–145)
WBC # BLD AUTO: 6.8 K/UL (ref 4.8–10.8)

## 2024-01-18 PROCEDURE — 85027 COMPLETE CBC AUTOMATED: CPT

## 2024-01-18 PROCEDURE — 99239 HOSP IP/OBS DSCHRG MGMT >30: CPT | Performed by: INTERNAL MEDICINE

## 2024-01-18 PROCEDURE — 80048 BASIC METABOLIC PNL TOTAL CA: CPT

## 2024-01-18 PROCEDURE — 36415 COLL VENOUS BLD VENIPUNCTURE: CPT

## 2024-01-18 PROCEDURE — 700105 HCHG RX REV CODE 258: Performed by: INTERNAL MEDICINE

## 2024-01-18 PROCEDURE — 83735 ASSAY OF MAGNESIUM: CPT

## 2024-01-18 PROCEDURE — 700102 HCHG RX REV CODE 250 W/ 637 OVERRIDE(OP): Performed by: INTERNAL MEDICINE

## 2024-01-18 PROCEDURE — 700111 HCHG RX REV CODE 636 W/ 250 OVERRIDE (IP): Performed by: INTERNAL MEDICINE

## 2024-01-18 PROCEDURE — 94760 N-INVAS EAR/PLS OXIMETRY 1: CPT

## 2024-01-18 PROCEDURE — A9270 NON-COVERED ITEM OR SERVICE: HCPCS | Performed by: INTERNAL MEDICINE

## 2024-01-18 RX ORDER — CEFUROXIME AXETIL 250 MG/1
500 TABLET ORAL EVERY 12 HOURS
Status: DISCONTINUED | OUTPATIENT
Start: 2024-01-18 | End: 2024-01-18

## 2024-01-18 RX ORDER — CEFUROXIME AXETIL 250 MG/1
250 TABLET ORAL EVERY 12 HOURS
Qty: 10 TABLET | Refills: 0 | Status: ACTIVE | OUTPATIENT
Start: 2024-01-18 | End: 2024-01-23

## 2024-01-18 RX ORDER — AMLODIPINE BESYLATE 10 MG/1
10 TABLET ORAL DAILY
Qty: 30 TABLET | Refills: 0 | Status: SHIPPED | OUTPATIENT
Start: 2024-01-18

## 2024-01-18 RX ORDER — CEFUROXIME AXETIL 250 MG/1
250 TABLET ORAL EVERY 12 HOURS
Status: DISCONTINUED | OUTPATIENT
Start: 2024-01-18 | End: 2024-01-18

## 2024-01-18 RX ORDER — CEFUROXIME AXETIL 250 MG/1
250 TABLET ORAL EVERY 12 HOURS
Status: DISCONTINUED | OUTPATIENT
Start: 2024-01-18 | End: 2024-01-18 | Stop reason: HOSPADM

## 2024-01-18 RX ADMIN — CEFUROXIME AXETIL 250 MG: 250 TABLET ORAL at 11:21

## 2024-01-18 RX ADMIN — AMLODIPINE BESYLATE 10 MG: 5 TABLET ORAL at 05:17

## 2024-01-18 RX ADMIN — MOMETASONE FUROATE AND FORMOTEROL FUMARATE DIHYDRATE 2 PUFF: 100; 5 AEROSOL RESPIRATORY (INHALATION) at 11:22

## 2024-01-18 RX ADMIN — HEPARIN SODIUM 5000 UNITS: 5000 INJECTION, SOLUTION INTRAVENOUS; SUBCUTANEOUS at 05:22

## 2024-01-18 RX ADMIN — CEFAZOLIN 1 G: 1 INJECTION, POWDER, FOR SOLUTION INTRAMUSCULAR; INTRAVENOUS at 05:19

## 2024-01-18 RX ADMIN — HYDROCHLOROTHIAZIDE 25 MG: 25 TABLET ORAL at 05:17

## 2024-01-18 RX ADMIN — BENAZEPRIL HYDROCHLORIDE 40 MG: 10 TABLET ORAL at 05:17

## 2024-01-18 ASSESSMENT — PAIN DESCRIPTION - PAIN TYPE: TYPE: ACUTE PAIN

## 2024-01-18 NOTE — CARE PLAN
Problem: Knowledge Deficit - Standard  Goal: Patient and family/care givers will demonstrate understanding of plan of care, disease process/condition, diagnostic tests and medications  Outcome: Progressing     Problem: Mobility  Goal: Patient's capacity to carry out activities will improve  Outcome: Progressing     Problem: Wound/ / Incision Healing  Goal: Patient's wound/surgical incision will decrease in size and heals properly  Outcome: Progressing     The patient is Stable - Low risk of patient condition declining or worsening    Shift Goals  Clinical Goals: Pt will receive wound care consult today; Pt will maintain SBP < 170 mmHg throughout shift after interventions  Patient Goals: Rest comfortably and stay informed re: overall care plan  Family Goals: n/a    Progress made toward(s) clinical / shift goals:  Pt maintained SBP < 170 mmHg after interventions this shift. Pt aware of overall plan including MRI interpretation, continuation of IV antibiotic therapy, and wound care.    Patient is not progressing towards the following goals: n/a

## 2024-01-18 NOTE — DISCHARGE INSTRUCTIONS
Nursing to cleanse wound/periwound with NS.  Pat periwound dry.   Apply petrolatum gauze (clear) to wound bed, then cover with adhesive foam.  Change daily and as needed if saturated or to be reinforced     Diet    Resume your normal diet as tolerated.  A diet low in cholesterol, fat, and sodium is recommended for good health.

## 2024-01-18 NOTE — WOUND TEAM
[facility]  Inpatient Services  Initial Wound and Skin Care Evaluation    Admission Date: 2024     Last order of IP CONSULT TO WOUND CARE was found on 2024 from Hospital Encounter on 2024     HPI, PMH, SH: Reviewed    Past Surgical History:   Procedure Laterality Date    US-CYST ASPIRATION-BREAST INITIAL       Social History     Tobacco Use    Smoking status: Former     Current packs/day: 0.00     Types: Cigarettes     Quit date: 2015     Years since quittin.9    Smokeless tobacco: Never   Substance Use Topics    Alcohol use: No     Chief Complaint   Patient presents with    Laceration     Caught leg under car door on 24    Sent from Urgent Care     Dx w/ Osteomyelitis of Tibia at  today     Diagnosis: Cellulitis [L03.90]    Unit where seen by Wound Team:      WOUND CONSULT RELATED TO:  L shin    WOUND TEAM PLAN OF CARE - Frequency of Follow-up:   Nursing to follow dressing orders written for wound care. Contact wound team if area fails to progress, deteriorates or with any questions/concerns if something comes up before next scheduled follow up (See below as to whether wound is following and frequency of wound follow up)   Not following, consult as needed  - L shin    WOUND HISTORY:   Per pt she slid her leg underneath the car door.       WOUND ASSESSMENT/LDA  Wound 24 Skin Tear Pretibial Proximal Left (Active)   Date First Assessed/Time First Assessed: 24 2300   Present on Original Admission: Yes  Hand Hygiene Completed: Yes  Primary Wound Type: Skin Tear  Location: Pretibial  Wound Orientation: Proximal  Laterality: Left                                    Assessments 2024  5:00 PM   Site Assessment Red;Purple   Periwound Assessment Ecchymosis;Red   Margins Defined edges;Attached edges   Closure Open to air   Drainage Amount None   Treatments Cleansed;Nonselective debridement   Wound Cleansing Normal Saline Irrigation   Periwound Protectant  Not Applicable   Dressing Status Intact   Dressing Changed New   Dressing Cleansing/Solutions Not Applicable   Dressing Options Petroleum Gauze (clear);Silicone Adhesive Foam   Dressing Change/Treatment Frequency Daily, and As Needed   NEXT Dressing Change/Treatment Date 01/18/24   NEXT Weekly Photo (Inpatient Only) 01/23/24   Wound Team Following Not following   Non-staged Wound Description Full thickness   Wound Length (cm) 6.5 cm   Wound Width (cm) 4.5 cm   Wound Surface Area (cm^2) 29.25 cm^2   Shape irregular        Vascular:    MARIA:   No results found.    Lab Values:    Lab Results   Component Value Date/Time    WBC 6.5 01/17/2024 01:08 AM    RBC 4.78 01/17/2024 01:08 AM    HEMOGLOBIN 14.7 01/17/2024 01:08 AM    HEMATOCRIT 43.9 01/17/2024 01:08 AM    CREACTPROT <0.30 01/17/2024 01:08 AM    SEDRATEWES 5 01/17/2024 01:08 AM    HBA1C 6.2 (H) 02/14/2023 08:20 AM         Culture Results show:  No results found for this or any previous visit (from the past 720 hour(s)).    Pain Level/Medicated:  None, Tolerated without pain medication       INTERVENTIONS BY WOUND TEAM:  Chart and images reviewed. Discussed with bedside RN. All areas of concern (based on picture review, LDA review and discussion with bedside RN) have been thoroughly assessed. Documentation of areas based on significant findings. This RN in to assess patient. Performed standard wound care which includes appropriate positioning, dressing removal and non-selective debridement. Pictures and measurements obtained weekly if/when required.    Wound:  LLE  Preparation for Dressing removal: Open to air  Cleansed/Non-selectively Debrided with:  Normal Saline and Gauze  Ariadne wound: Cleansed with Normal Saline and Gauze, Prepped with N/A  Primary Dressing:  petrolatum gauze  Secondary (Outer) Dressing: adhesive foam    Advanced Wound Care Discharge Planning  Number of Clinicians necessary to complete wound care: 1  Is patient requiring IV pain medications for  dressing changes:  No   Length of time for dressing change 30 min. (This does not include chart review, pre-medication time, set up, clean up or time spent charting.)    Interdisciplinary consultation: Patient, Bedside RN (Will)    EVALUATION / RATIONALE FOR TREATMENT:     Date:  01/17/24  Wound Status:  Initial evaluation    Pt has approximated skin tear with scab along approximated edge. Periwound skin is red. She had no c/o pain when care provided. Petrolatum gauze for slight moisture to crusted edge and non-adherence.          Goals: Slow decrease in wound area and depth weekly.    NURSING PLAN OF CARE ORDERS:  Dressing changes: See Dressing Care orders  RN Prevention Protocol    NUTRITION RECOMMENDATIONS   Wound Team Recommendations:  N/A    DIET ORDERS (From admission to next 24h)       Start     Ordered    01/16/24 2151  Diet Order Diet: Regular  ALL MEALS        Question:  Diet:  Answer:  Regular    01/16/24 2152                    PREVENTATIVE INTERVENTIONS:    Q shift Tom - performed per nursing policy  Q shift pressure point assessments - performed per nursing policy    Surface/Positioning  Standard/trauma mattress - Currently in Place  Reposition q 2 hours - Currently in Place    Containment/Moisture Prevention    bathroom - Currently in Place    Mobilization      Up to chair and Ambulate     Anticipated discharge plans:  TBD        Vac Discharge Needs:  Vac Discharge plan is purely a recommendation from wound team and not a requirement for discharge unless otherwise stated by physician.  Not Applicable Pt not on a wound vac

## 2024-01-18 NOTE — PROGRESS NOTES
Received report from Virgil SALINAS. Pt resting in bed, awake Pt A&O x4, on RA. Pt denies pain at this time. Pt continued on IV fluids. Pt updated with POC which includes WBC monitoring and IV fluids. Call light within reach,bed alarm on, bed in lowest position, fall precautions in place, all needs met at this time.    - pulse ox on, pt q2 hr rounding in progress.

## 2024-01-18 NOTE — DISCHARGE SUMMARY
"Discharge Summary    CHIEF COMPLAINT ON ADMISSION  Chief Complaint   Patient presents with    Laceration     Caught leg under car door on 01/08/24    Sent from Urgent Care     Dx w/ Osteomyelitis of Tibia at  today       Reason for Admission  Sent from Urgent Care; Wound Check     Admission Date  1/16/2024    CODE STATUS  DNAR/DNI    HPI & HOSPITAL COURSE  As per chart review:  \"89 y.o. female who presented 1/16/2024 with worsening cellulitis.  Patient states approximately 1 week ago, she hit her leg on the car door causing a significant skin tear.  Patient states it became red, went to see her physician on the 11th.  At that time she was started on clindamycin.  Patient continued to have worsening erythema so she presented to urgent care.  At urgent care, they did an x-ray, the read was possible osteomyelitis so she was sent to the emergency department.\"     The patient was admitted for further management and care.  The patient was started on Ancef while hospitalized.  We did an MRI which did not report osteomyelitis.  I discussed case with ID pharmacy and we have transition to cefuroxime.    As per case management the patient already has wound care clinic appointment tomorrow.  The patient would like to go home.  Will discharge patient home.  She will require close follow-up with PCP as an outpatient as well as wound care.    Of note the patient with a history of COPD, we have placed referral to pulmonary as it seems she has not seen one in the past as per the patient's daughter-in-law.    Patient seen at bedside this morning.  Currently not complaining of any pain or any other symptom, she feels at her baseline and would like to go home.  Will discharge patient home.  She will try close follow-up with PCP, wound care and pulmonary as an outpatient.    Therefore, she is discharged in fair and stable condition to home with close outpatient follow-up.    The patient met 2-midnight criteria for an inpatient stay at " the time of discharge.    Discharge Date  01/18/2024    FOLLOW UP ITEMS POST DISCHARGE  The patient will require to complete antibiotic treatment as an outpatient.  The patient has follow-up appointment with wound care clinic.  We have placed referral to pulmonary as well.  The patient require close follow-up with PCP.    DISCHARGE DIAGNOSES  Principal Problem:    Cellulitis (POA: Yes)  Active Problems:    Hyperlipidemia (Chronic) (POA: Yes)    COPD (chronic obstructive pulmonary disease) (HCC) (Chronic) (POA: Yes)    Primary hypertension (Chronic) (POA: Yes)    Hyponatremia (POA: Yes)    Hyperglycemia (POA: Yes)    Hypokalemia (POA: Unknown)  Resolved Problems:    * No resolved hospital problems. *      FOLLOW UP  Future Appointments   Date Time Provider Department Center   1/19/2024  2:00 PM Abbi Rosales R.N. [facility] 2nd [facility]     Mariya Bhardwaj M.D.  [address]  [address]  [place] [address]  [phone]    Follow up      Wound Care Clinic    Schedule an appointment as soon as possible for a visit      Pulmonary    Schedule an appointment as soon as possible for a visit        MEDICATIONS ON DISCHARGE     Medication List        START taking these medications        Instructions   cefUROXime 250 MG Tabs  Commonly known as: Ceftin   Take 1 Tablet by mouth every 12 hours for 5 days.  Dose: 250 mg     mometasone-formoterol 100-5 MCG/ACT Aero  Commonly known as: Dulera   Doctor's comments: Please provide enough for 3 months  Inhale 2 Puffs 2 times a day.  Dose: 2 Puff            CONTINUE taking these medications        Instructions   amLODIPine 10 MG Tabs  Commonly known as: Norvasc   Take 1 Tablet by mouth every day.  Dose: 10 mg     benazepril 40 MG tablet  Commonly known as: Lotensin   Take 1 Tab by mouth every day.  Dose: 40 mg     CALCIUM 600 PO   Take 600 mg by mouth 2 times a day.  Dose: 600 mg     hydroCHLOROthiazide 25 MG Tabs   Take 25 mg by mouth every day.  Dose: 25 mg     latanoprost 0.005 %  Soln  Commonly known as: Xalatan   Administer 1 Drop into the left eye at bedtime.  Dose: 1 Drop     lovastatin 10 MG tablet  Commonly known as: Mevacor   Take 10 mg by mouth every evening.  Dose: 10 mg     Prolia 60 MG/ML Sosy injection  Generic drug: denosumab   Inject 60 mg under the skin every 6 months. Next dose due on 2/13/2024  Dose: 60 mg     TYLENOL PO   Take 1 Tablet by mouth 2 times a day as needed (For pain). Pt is not sure the strength (OTC)  Dose: 1 Tablet     VITAMIN D3 PO   Take 1 Capsule by mouth every morning.  Dose: 1 Capsule            STOP taking these medications      clindamycin 150 MG Caps  Commonly known as: Cleocin              Allergies  Allergies   Allergen Reactions    Amoxicillin-Pot Clavulanate Hives    Diclofenac Swelling    Sulfamethoxazole-Trimethoprim Hives    Codeine Vomiting and Nausea       DIET  Orders Placed This Encounter   Procedures    Diet Order Diet: Regular     Standing Status:   Standing     Number of Occurrences:   1     Order Specific Question:   Diet:     Answer:   Regular [1]       ACTIVITY  As tolerated.  Weight bearing as tolerated    CONSULTATIONS  Wound Care    PROCEDURES      MR-LOWER EXTREMITY-NO JOINT-W/O LEFT   Final Result         1. No MR evidence for osteomyelitis.      2. Diffuse subcutaneous and muscle edema about the calf. No fluid collections seen.      DX-TIBIA AND FIBULA LEFT   Final Result      1.  No fracture of LEFT lower leg.   2.  Anterior soft tissue swelling and irregularity.   3.  Probable varicose veins medially.           LABORATORY  Lab Results   Component Value Date    SODIUM 135 01/18/2024    POTASSIUM 4.4 01/18/2024    CHLORIDE 102 01/18/2024    CO2 22 01/18/2024    GLUCOSE 100 (H) 01/18/2024    BUN 23 (H) 01/18/2024    CREATININE 0.88 01/18/2024        Lab Results   Component Value Date    WBC 6.8 01/18/2024    HEMOGLOBIN 14.7 01/18/2024    HEMATOCRIT 44.2 01/18/2024    PLATELETCT 206 01/18/2024        Total time of the discharge  process exceeds 31 minutes.

## 2024-01-18 NOTE — PROGRESS NOTES
Received report from night shift RN. Assumed pt care 0700  Pt is A&Ox4, resting comfortably in bed. Plan of care reviewed for activities and goals this shift. Medication eduction provided.  Pt verbalizes understanding of plan of care for this shift.  Pt denies pain and/or nausea at this moment.  Patients needs attended well. Fall precautions in place. Bed at lowest position. Call light and personal belongings within reach.   Hourly rounding in progress  .Discharge instructions reviewed. Medication education, follow up and home care education provided. Pt verbalizes understanding of instructions.    Pt tolerated PO ABX prior to dc.  Dressing changed prior to dc   Level of comfort increased prior to discharge. VSS. PIV removed. Belongings gathered to take home.

## 2024-01-19 ENCOUNTER — NON-PROVIDER VISIT (OUTPATIENT)
Dept: WOUND CARE | Facility: MEDICAL CENTER | Age: 89
End: 2024-01-19
Attending: INTERNAL MEDICINE
Payer: MEDICARE

## 2024-01-19 PROCEDURE — 97597 DBRDMT OPN WND 1ST 20 CM/<: CPT

## 2024-01-19 RX ORDER — BENAZEPRIL HYDROCHLORIDE 40 MG/1
1 TABLET ORAL
COMMUNITY
End: 2024-01-19

## 2024-01-20 LAB
BACTERIA WND AEROBE CULT: NORMAL
GRAM STN SPEC: NORMAL
SIGNIFICANT IND 70042: NORMAL
SITE SITE: NORMAL
SOURCE SOURCE: NORMAL

## 2024-01-20 NOTE — CERTIFICATION
"Non Provider Encounter- Full Thickness wound    HISTORY OF PRESENT ILLNESS  Wound History:    START OF CARE IN CLINIC: 24    REFERRING PROVIDER: Ed Vera M.D.    WOUND- Full Thickness Wound   LOCATION: LLE anterior   HISTORY: Patient is a very pleasant 89 y/o female who had a slip on the ice on 24 and sustained a skin tear to her left lower leg on the car door. Patient presented to Urgent care at that time. She was treating with bacitracin, telfa and coban per urgent care instructions when she developed cellulitis. She presented to the ED on 24 and was admitted for cellulitis and discharge 24, see discharge summary below:    Per Dr. Vera discharge summary:  HPI & HOSPITAL COURSE  As per chart review:  \"89 y.o. female who presented 2024 with worsening cellulitis.  Patient states approximately 1 week ago, she hit her leg on the car door causing a significant skin tear.  Patient states it became red, went to see her physician on the .  At that time she was started on clindamycin.  Patient continued to have worsening erythema so she presented to urgent care.  At urgent care, they did an x-ray, the read was possible osteomyelitis so she was sent to the emergency department.\"     The patient was admitted for further management and care.  The patient was started on Ancef while hospitalized.  We did an MRI which did not report osteomyelitis.  I discussed case with ID pharmacy and we have transition to cefuroxime.    Pertinent Labs and Diagnostics:    Labs:     A1c:   Lab Results   Component Value Date/Time    HBA1C 6.2 (H) 2023 08:20 AM          IMAGIN2024 7:29 PM     HISTORY/REASON FOR EXAM:  Atraumatic Pain/Swelling/Deformity.  LEFT lower leg laceration.     TECHNIQUE/EXAM DESCRIPTION AND NUMBER OF VIEWS:  2 views of the LEFT tibia and fibula.     COMPARISON: None     FINDINGS:  Mild soft tissue swelling and irregularity of the anterior proximal lower " leg.  Tibia and fibula are intact.  No radiopaque foreign body.  Soft tissue calcifications present.  Serpiginous structures within the pelvis medial soft tissues of the proximal lower leg suggest venous varicosities.  Vascular calcifications.     IMPRESSION:     1.  No fracture of LEFT lower leg.  2.  Anterior soft tissue swelling and irregularity.  3.  Probable varicose veins medially.    VASCULAR STUDIES: n/a    LAST  WOUND CULTURE:  DATE : 1/16/24    CULTURE WOUND W/ GRAM STAIN  Order: [id number]  Status: Preliminary result       Visible to patient: No (not released)       Next appt: 01/26/2024 at 11:00 AM in Wound Care (Dariana Sanders [facility]ICKY)    Specimen Information: Left Leg; Wound   0 Result Notes      Component 3 d ago   Significant Indicator NEG P   Source WND P   Site LEFT LEG P   Culture Result No growth at 48 hours. P   Gram Stain Result No organisms seen.                 Patient allergies and medications reviewed via Epic.     Wound Assessment:    Wound 01/16/24 Skin Tear Anterior;Lower;Medial Left (Active)   Wound Image       Site Assessment Pink;Red;Yellow    Periwound Assessment Hemosiderin Staining;Ecchymosis;Fragile;Edema    Margins Attached edges    Closure Secondary intention    Drainage Amount Small    Drainage Description Serosanguineous    Treatments Cleansed;Topical Lidocaine;CSWD - Conservative Sharp Wound Debridement;Site care;Compression    Wound Cleansing Hypochlorus Acid    Periwound Protectant No-sting Skin Prep;Skin Moisturizer    Dressing Status Clean;Dry;Intact    Dressing Changed New    Dressing Cleansing/Solutions Not Applicable    Dressing Options Hydrofera Blue Ready;Soft Conforming Roll;Compression Wrap Two Layer    Dressing Change/Treatment Frequency Weekly, and As Needed    Wound Team Following Weekly    Non-staged Wound Description Full thickness    Wound Length (cm) 6.5 cm    Wound Width (cm) 0.5 cm    Wound Depth (cm) 0.1 cm    Wound Surface Area (cm^2) 3.25 cm^2    Wound  Volume (cm^3) 0.325 cm^3    Post-Procedure Length (cm) 6 cm    Post-Procedure Width (cm) 0.5 cm    Post-Procedure Depth (cm) 0.2 cm    Post-Procedure Surface Area (cm^2) 3 cm^2    Post-Procedure Volume (cm^3) 0.6 cm^3    Tunneling (cm) 0 cm    Undermining (cm) 0 cm    Wound Odor None    Pulses Left;DP;PT;2+    Exposed Structures None            Pre-debridement Photo    Procedures:    -2% viscous lidocaine applied topically to wound bed for approximately 5 minutes prior to debridement  -Curette used to debride wound bed of adherent biofilm, 3 cm² debrided. Scant bleeding noted, controlled with manual pressure.  -Refer to flowsheet for wound care details.       Post-debridement Photo      PATIENT EDUCATION  - Patient and daughter in law instructed on dressing selection and need for compression.   - Instructed patient to purchase cast cover to keep wrap clean dry and intact when showering.  -Advised to go to ER for any increased redness, swelling, drainage or odor, or if patient develops fever, chills, nausea or vomiting.  -Importance of adequate nutrition for wound healing  -Increase protein intake (unless contraindicated by renal status)

## 2024-01-20 NOTE — PATIENT INSTRUCTIONS
-Keep your wound dressing clean, dry, and intact.    -Remove your compression wrap if you have severe pain, severe swelling, numbness, color change, or temperature change in your toes. If you need to remove your compression wrap, do so by unrolling it. Do not cut the compression wrap off to prevent cutting yourself on accident.    -Should you experience any significant changes in your wound(s), such as infection (redness, swelling, localized heat, increased pain, fever > 101 F, chills) or have any questions regarding your home care instructions, please contact the wound center at [phone]. If after hours, contact your primary care physician or go to the hospital emergency room.

## 2024-01-26 ENCOUNTER — NON-PROVIDER VISIT (OUTPATIENT)
Dept: WOUND CARE | Facility: MEDICAL CENTER | Age: 89
End: 2024-01-26
Attending: INTERNAL MEDICINE
Payer: MEDICARE

## 2024-01-26 PROCEDURE — 97597 DBRDMT OPN WND 1ST 20 CM/<: CPT

## 2024-01-26 NOTE — PATIENT INSTRUCTIONS
-Keep your wound dressing clean, dry, and intact.    -Remove your compression wrap if you have severe pain, severe swelling, numbness, color change, or temperature change in your toes. If you need to remove your compression wrap, do so by unrolling it. Do not cut the compression wrap off to prevent cutting yourself on accident.    -Should you experience any significant changes in your wound(s), such as infection (redness, swelling, localized heat, increased pain, fever > 101 F, chills) or have any questions regarding your home care instructions, please contact the wound center at [phone]. If after hours, contact your primary care physician or go to the hospital emergency room.

## 2024-01-26 NOTE — PROGRESS NOTES
CSWD using curette to remove ~1 to 2cm2 nonviable tissue from wound bed. 2% topical Lidocaine applied prior to debridement with ~5 minute dwell time. Patient tolerated well; denied pain.     Patient states she has protein in her urine.  Would like to know if she would need to leave a urine sample before her appointment.    Please return patients call today.

## 2024-02-01 ENCOUNTER — OFFICE VISIT (OUTPATIENT)
Dept: WOUND CARE | Facility: MEDICAL CENTER | Age: 89
End: 2024-02-01
Attending: INTERNAL MEDICINE
Payer: MEDICARE

## 2024-02-01 VITALS
HEART RATE: 86 BPM | OXYGEN SATURATION: 93 % | RESPIRATION RATE: 16 BRPM | TEMPERATURE: 97.5 F | SYSTOLIC BLOOD PRESSURE: 119 MMHG | DIASTOLIC BLOOD PRESSURE: 61 MMHG

## 2024-02-01 DIAGNOSIS — S81.802D OPEN WOUND OF LEFT LOWER LEG, SUBSEQUENT ENCOUNTER: ICD-10-CM

## 2024-02-01 DIAGNOSIS — R60.0 EDEMA OF BOTH LOWER LEGS: ICD-10-CM

## 2024-02-01 PROCEDURE — 3074F SYST BP LT 130 MM HG: CPT | Performed by: NURSE PRACTITIONER

## 2024-02-01 PROCEDURE — 3078F DIAST BP <80 MM HG: CPT | Performed by: NURSE PRACTITIONER

## 2024-02-01 PROCEDURE — 11042 DBRDMT SUBQ TIS 1ST 20SQCM/<: CPT | Performed by: NURSE PRACTITIONER

## 2024-02-01 PROCEDURE — 99214 OFFICE O/P EST MOD 30 MIN: CPT | Mod: 25

## 2024-02-01 PROCEDURE — 11042 DBRDMT SUBQ TIS 1ST 20SQCM/<: CPT

## 2024-02-01 ASSESSMENT — ENCOUNTER SYMPTOMS
DIARRHEA: 0
NAUSEA: 0
CHILLS: 0
SHORTNESS OF BREATH: 0
COUGH: 0
BACK PAIN: 0
CONSTIPATION: 0
FEVER: 0
VOMITING: 0

## 2024-02-01 NOTE — PATIENT INSTRUCTIONS
-Keep dressings clean and dry. Change dressings every 3-4 days, and if they become over saturated, soiled or fall off.     -If you need to change your dressings at home: Wash your wound with normal saline, wound cleanser, or unscented soap and water prior to applying your new dressings. Please avoid cleansing with hydrogen peroxide or rubbing alcohol.    -Avoid prolonged standing or sitting without elevating your legs.    -Remove your compression garments if you have severe pain, severe swelling, numbness, color change, or temperature change in your toes. If you need to remove your compression garments, do so by unrolling them. Do not cut the compression garments off, this is to prevent cutting yourself on accident.    -Should you experience any significant changes in your wound(s), such as signs of infection (increasing redness, swelling, localized heat, increased pain, fever > 101 F, chills) or have any questions regarding your home care instructions, please contact the wound center at [phone]. If after hours, contact your primary care physician or go to the hospital emergency room.     -If you are 5 or more minutes late for an appointment, we reserve the right to cancel and reschedule that appointment. Additionally, if you are habitually late or not showing (3 late cancellations and/or no shows), we reserve the right to cancel your remaining appointments and it will be your responsibility to obtain a new referral if services are still needed.

## 2024-02-02 NOTE — PROGRESS NOTES
Provider Encounter- Full Thickness wound    HISTORY OF PRESENT ILLNESS  Wound History:    START OF CARE IN CLINIC: 01/19/24               REFERRING PROVIDER: Ed Vera M.D.               WOUND- Full Thickness Wound              LOCATION: LLE anterior              HISTORY: Patient is a very pleasant 89 y/o female who slipped on the ice on 1/8/24 striking her leg on her car door creating a wound to her left shin. Patient presented to Urgent care soon after.  She was treating with bacitracin, telfa and coban per urgent care instructions when she developed cellulitis.  She followed up with her PCP and was prescribed clindamycin.  She returned to urgent care on 1/16, and x-ray was done which showed possible OM and she was advised to go to emergency room.  She presented to the ED and was admitted.  She was treated with IV Ancef while hospitalized.  An MRI was done, did not show osteomyelitis.  She was discharged home on p.o. cefuroxime and referred to outpatient wound clinic.    Pertinent Medical History: Chronic pain, COPD, hypertension, protein calorie malnutrition,    TOBACCO USE: Former smoker, quit in 2015    Patient's problem list, allergies, and current medications reviewed and updated in Epic    Interval History:  2/1/2024 : Initial provider visit with KARINA Paez, BARBER-BC, NANDON, CFLOUANN.  Patient states he is feeling well overall.  Her wound is progressing well at this time, nearly resolved.  She is tolerating compression wrap without any difficulty.      REVIEW OF SYSTEMS:   Review of Systems   Constitutional:  Negative for chills and fever.   Respiratory:  Negative for cough and shortness of breath.    Cardiovascular:  Negative for chest pain.   Gastrointestinal:  Negative for constipation, diarrhea, nausea and vomiting.   Musculoskeletal:  Negative for back pain and joint pain.       PHYSICAL EXAMINATION:   /61   Pulse 86   Temp 36.4 °C (97.5 °F) (Temporal)   Resp 16   SpO2 93%      Physical Exam  Constitutional:       Comments: Thin, underweight   Cardiovascular:      Pulses: Normal pulses.   Pulmonary:      Effort: Pulmonary effort is normal.   Musculoskeletal:      Comments: Nonpitting edema of bilateral lower extremities   Skin:     Comments: Full-thickness wound to left anterior lower leg-wound areas decreased significantly, edges rolled, minimal to moderate serosanguineous drainage, no periwound erythema or induration   Neurological:      Mental Status: She is alert and oriented to person, place, and time.   Psychiatric:         Mood and Affect: Mood normal.         WOUND ASSESSMENT  Wound 01/16/24 Skin Tear Anterior;Lower;Medial Left (Active)   Wound Image    02/01/24 1349   Site Assessment Pink;Yellow 02/01/24 1349   Periwound Assessment Hemosiderin Staining;Scar tissue;Edema;Fragile 02/01/24 1349   Margins Attached edges 02/01/24 1349   Closure Secondary intention 01/19/24 1400   Drainage Amount Small 02/01/24 1349   Drainage Description Serosanguineous 02/01/24 1349   Treatments Cleansed;Topical Lidocaine;Provider debridement;Compression 02/01/24 1349   Wound Cleansing Normal Saline Irrigation 02/01/24 1349   Periwound Protectant Skin Moisturizer;Barrier Paste 02/01/24 1349   Dressing Status Clean;Dry;Intact 01/19/24 1400   Dressing Changed New 01/26/24 1100   Dressing Cleansing/Solutions Not Applicable 02/01/24 1349   Dressing Options Mepitel One;Hydrofera Blue Ready;Soft Conforming Roll;Compression Wrap Two Layer 02/01/24 1349   Dressing Change/Treatment Frequency Weekly, and As Needed 02/01/24 1349   Wound Team Following Weekly 02/01/24 1349   Non-staged Wound Description Full thickness 02/01/24 1349   Wound Length (cm) 1 cm 02/01/24 1349   Wound Width (cm) 0.4 cm 02/01/24 1349   Wound Depth (cm) 0.2 cm 02/01/24 1349   Wound Surface Area (cm^2) 0.4 cm^2 02/01/24 1349   Wound Volume (cm^3) 0.08 cm^3 02/01/24 1349   Post-Procedure Length (cm) 0.9 cm 02/01/24 1349   Post-Procedure  Width (cm) 0.3 cm 24 1349   Post-Procedure Depth (cm) 0.2 cm 24 134   Post-Procedure Surface Area (cm^2) 0.27 cm^2 24 134   Post-Procedure Volume (cm^3) 0.054 cm^3 24 134   Wound Healing % 75 24 1349   Tunneling (cm) 0 cm 24 1349   Undermining (cm) 0 cm 24 134   Wound Odor None 24 1349   Pulses Left;DP;PT;2+ 24 1400   Exposed Structures None 24 1349   Number of days: 16       PROCEDURE:   -2% viscous lidocaine applied topically to wound bed for approximately 5 minutes prior to debridement  -Curette used to debride wound bed.  Excisional debridement was performed to remove devitalized tissue until healthy, bleeding tissue was visualized.   Entire surface of wound, 0.27 cm² debrided.  Tissue debrided into the subcutaneous layer.    -Bleeding controlled with manual pressure.    -Wound care completed by wound RN, refer to flowsheet  -Patient tolerated the procedure well, without c/o pain or discomfort.       Pertinent Labs and Diagnostics:    Labs:     A1c:   Lab Results   Component Value Date/Time    HBA1C 6.2 (H) 2023 08:20 AM          IMAGIN2024-MRI of left lower extremity without contrast  IMPRESSION:   1. No MR evidence for osteomyelitis.     2. Diffuse subcutaneous and muscle edema about the calf. No fluid collections seen.    VASCULAR STUDIES: No pertinent studies found in epic    LAST  WOUND CULTURE:  DATE :   Lab Results   Component Value Date/Time    CULTRSULT No growth at 72 hours. 2024 11:00 PM         ASSESSMENT AND PLAN:   1. Open wound of left lower leg, subsequent encounter    2024: Traumatic injury to left lower extremity.  Wound measures significantly smaller today  -Excisional debridement of wound in clinic today, medically necessary to promote wound healing.  -Patient to return to clinic weekly for assessment and debridement  -Patient to change dressing 1-2 times per week in between clinic visits   -Wound is  currently progressing well.  Anticipate full resolution within 7 to 14 days    2. Edema of both lower legs    2/1/2024: Nonpitting edema bilateral lower extremities  -Patient would likely benefit from compression stockings, 15 to 20 mmHg            PATIENT EDUCATION  - Importance of adequate nutrition for wound healing  -Advised to go to ER for any increased redness, swelling, drainage, or odor, or if patient develops fever, chills, nausea or vomiting.     My total time spent caring for the patient on the day of the encounter was 20 minutes.   This does not include time spent on separately billable procedures/tests.       Please note that this note may have been created using voice recognition software. I have worked with technical experts from [facility] to optimize the interface.  I have made every reasonable attempt to correct obvious errors, but there may be errors of grammar and possibly content that I did not discover before finalizing the note.    N

## 2024-02-06 ENCOUNTER — HOSPITAL ENCOUNTER (OUTPATIENT)
Dept: LAB | Facility: MEDICAL CENTER | Age: 89
End: 2024-02-06
Attending: FAMILY MEDICINE
Payer: MEDICARE

## 2024-02-06 LAB
ALBUMIN SERPL BCP-MCNC: 4.4 G/DL (ref 3.2–4.9)
ALBUMIN/GLOB SERPL: 1.5 G/DL
ALP SERPL-CCNC: 48 U/L (ref 30–99)
ALT SERPL-CCNC: 15 U/L (ref 2–50)
ANION GAP SERPL CALC-SCNC: 11 MMOL/L (ref 7–16)
AST SERPL-CCNC: 23 U/L (ref 12–45)
BASOPHILS # BLD AUTO: 0.3 % (ref 0–1.8)
BASOPHILS # BLD: 0.02 K/UL (ref 0–0.12)
BILIRUB SERPL-MCNC: 0.6 MG/DL (ref 0.1–1.5)
BUN SERPL-MCNC: 40 MG/DL (ref 8–22)
CALCIUM ALBUM COR SERPL-MCNC: 9.9 MG/DL (ref 8.5–10.5)
CALCIUM SERPL-MCNC: 10.2 MG/DL (ref 8.5–10.5)
CHLORIDE SERPL-SCNC: 102 MMOL/L (ref 96–112)
CHOLEST SERPL-MCNC: 172 MG/DL (ref 100–199)
CO2 SERPL-SCNC: 27 MMOL/L (ref 20–33)
CREAT SERPL-MCNC: 1.07 MG/DL (ref 0.5–1.4)
EOSINOPHIL # BLD AUTO: 0.18 K/UL (ref 0–0.51)
EOSINOPHIL NFR BLD: 2.8 % (ref 0–6.9)
ERYTHROCYTE [DISTWIDTH] IN BLOOD BY AUTOMATED COUNT: 48.5 FL (ref 35.9–50)
EST. AVERAGE GLUCOSE BLD GHB EST-MCNC: 128 MG/DL
GFR SERPLBLD CREATININE-BSD FMLA CKD-EPI: 49 ML/MIN/1.73 M 2
GLOBULIN SER CALC-MCNC: 2.9 G/DL (ref 1.9–3.5)
GLUCOSE SERPL-MCNC: 104 MG/DL (ref 65–99)
HBA1C MFR BLD: 6.1 % (ref 4–5.6)
HCT VFR BLD AUTO: 47.7 % (ref 37–47)
HDLC SERPL-MCNC: 76 MG/DL
HGB BLD-MCNC: 16.2 G/DL (ref 12–16)
IMM GRANULOCYTES # BLD AUTO: 0.01 K/UL (ref 0–0.11)
IMM GRANULOCYTES NFR BLD AUTO: 0.2 % (ref 0–0.9)
LDLC SERPL CALC-MCNC: 84 MG/DL
LYMPHOCYTES # BLD AUTO: 1.96 K/UL (ref 1–4.8)
LYMPHOCYTES NFR BLD: 30.2 % (ref 22–41)
MCH RBC QN AUTO: 31.4 PG (ref 27–33)
MCHC RBC AUTO-ENTMCNC: 34 G/DL (ref 32.2–35.5)
MCV RBC AUTO: 92.4 FL (ref 81.4–97.8)
MONOCYTES # BLD AUTO: 0.69 K/UL (ref 0–0.85)
MONOCYTES NFR BLD AUTO: 10.6 % (ref 0–13.4)
NEUTROPHILS # BLD AUTO: 3.62 K/UL (ref 1.82–7.42)
NEUTROPHILS NFR BLD: 55.9 % (ref 44–72)
NRBC # BLD AUTO: 0 K/UL
NRBC BLD-RTO: 0 /100 WBC (ref 0–0.2)
PLATELET # BLD AUTO: 187 K/UL (ref 164–446)
PMV BLD AUTO: 11 FL (ref 9–12.9)
POTASSIUM SERPL-SCNC: 4.3 MMOL/L (ref 3.6–5.5)
PROT SERPL-MCNC: 7.3 G/DL (ref 6–8.2)
RBC # BLD AUTO: 5.16 M/UL (ref 4.2–5.4)
SODIUM SERPL-SCNC: 140 MMOL/L (ref 135–145)
TRIGL SERPL-MCNC: 61 MG/DL (ref 0–149)
WBC # BLD AUTO: 6.5 K/UL (ref 4.8–10.8)

## 2024-02-06 PROCEDURE — 83036 HEMOGLOBIN GLYCOSYLATED A1C: CPT | Mod: GA

## 2024-02-06 PROCEDURE — 36415 COLL VENOUS BLD VENIPUNCTURE: CPT

## 2024-02-06 PROCEDURE — 85025 COMPLETE CBC W/AUTO DIFF WBC: CPT

## 2024-02-06 PROCEDURE — 80053 COMPREHEN METABOLIC PANEL: CPT

## 2024-02-06 PROCEDURE — 80061 LIPID PANEL: CPT

## 2024-02-08 ENCOUNTER — NON-PROVIDER VISIT (OUTPATIENT)
Dept: WOUND CARE | Facility: MEDICAL CENTER | Age: 89
End: 2024-02-08
Attending: INTERNAL MEDICINE
Payer: MEDICARE

## 2024-02-08 PROCEDURE — 97602 WOUND(S) CARE NON-SELECTIVE: CPT

## 2024-02-08 NOTE — PATIENT INSTRUCTIONS
Reviewed POC, importance of keeping the secondary dressing dry and intact, nutrition for wound healing, compression therapy, s/s of complications/infection, when to notify MD/go to ER.  Pt verbalized understanding to all.

## 2024-02-15 ENCOUNTER — OFFICE VISIT (OUTPATIENT)
Dept: WOUND CARE | Facility: MEDICAL CENTER | Age: 89
End: 2024-02-15
Attending: INTERNAL MEDICINE
Payer: MEDICARE

## 2024-02-15 VITALS
OXYGEN SATURATION: 92 % | TEMPERATURE: 97.4 F | SYSTOLIC BLOOD PRESSURE: 113 MMHG | RESPIRATION RATE: 16 BRPM | DIASTOLIC BLOOD PRESSURE: 53 MMHG | HEART RATE: 75 BPM

## 2024-02-15 DIAGNOSIS — S81.802D OPEN WOUND OF LEFT LOWER LEG, SUBSEQUENT ENCOUNTER: ICD-10-CM

## 2024-02-15 DIAGNOSIS — R60.0 EDEMA OF BOTH LOWER LEGS: ICD-10-CM

## 2024-02-15 PROCEDURE — 3074F SYST BP LT 130 MM HG: CPT | Performed by: STUDENT IN AN ORGANIZED HEALTH CARE EDUCATION/TRAINING PROGRAM

## 2024-02-15 PROCEDURE — 99213 OFFICE O/P EST LOW 20 MIN: CPT | Performed by: STUDENT IN AN ORGANIZED HEALTH CARE EDUCATION/TRAINING PROGRAM

## 2024-02-15 PROCEDURE — 99213 OFFICE O/P EST LOW 20 MIN: CPT

## 2024-02-15 PROCEDURE — 3078F DIAST BP <80 MM HG: CPT | Performed by: STUDENT IN AN ORGANIZED HEALTH CARE EDUCATION/TRAINING PROGRAM

## 2024-02-15 NOTE — PROGRESS NOTES
Provider Encounter- Full Thickness wound    HISTORY OF PRESENT ILLNESS  Wound History:    START OF CARE IN CLINIC: 01/19/24               REFERRING PROVIDER: Ed Vera M.D.               WOUND- Full Thickness Wound              LOCATION: LLE anterior              HISTORY: Patient is a very pleasant 89 y/o female who slipped on the ice on 1/8/24 striking her leg on her car door creating a wound to her left shin. Patient presented to Urgent care soon after.  She was treating with bacitracin, telfa and coban per urgent care instructions when she developed cellulitis.  She followed up with her PCP and was prescribed clindamycin.  She returned to urgent care on 1/16, and x-ray was done which showed possible OM and she was advised to go to emergency room.  She presented to the ED and was admitted.  She was treated with IV Ancef while hospitalized.  An MRI was done, did not show osteomyelitis.  She was discharged home on p.o. cefuroxime and referred to outpatient wound clinic.    Pertinent Medical History: Chronic pain, COPD, hypertension, protein calorie malnutrition,    TOBACCO USE: Former smoker, quit in 2015    Patient's problem list, allergies, and current medications reviewed and updated in Epic    Interval History:  2/1/2024 : Initial provider visit with KARINA Paez, FNFABIENNE-BC, CWFRANCOISN, CFLOUANN.  Patient states he is feeling well overall.  Her wound is progressing well at this time, nearly resolved.  She is tolerating compression wrap without any difficulty.    2/15/2024: Clinic visit with Sandro Summers MD. Patient reports doing well, tolerated compression without issue. Wound has resolved today. Will discharge from clinic. Patient with varicose veins and some edema in lower extremities reportedly at end of day. Recommend light compression, she declines as she finds them uncomfortable and difficult to put on. Gave her handout on compression should she choose to wear in the future.    REVIEW OF  SYSTEMS:   Unchanged from previous wound clinic assessment on 2024, except as noted in interval history above      PHYSICAL EXAMINATION:   /53   Pulse 75   Temp 36.3 °C (97.4 °F) (Temporal)   Resp 16   SpO2 92%     Physical Exam  Constitutional:       Comments: Thin, underweight   Cardiovascular:      Pulses: Normal pulses.   Pulmonary:      Effort: Pulmonary effort is normal.   Musculoskeletal:      Comments: Nonpitting edema of bilateral lower extremities   Skin:     Comments: Full-thickness wound to left anterior lower leg- Wound resolved, covered with new epithelium.   Neurological:      Mental Status: She is alert and oriented to person, place, and time.   Psychiatric:         Mood and Affect: Mood normal.         WOUND ASSESSMENT         PROCEDURE:   - No debridement required, wound resolved.      Pertinent Labs and Diagnostics:    Labs:     A1c:   Lab Results   Component Value Date/Time    HBA1C 6.1 (H) 2024 09:19 AM          IMAGIN2024-MRI of left lower extremity without contrast  IMPRESSION:   1. No MR evidence for osteomyelitis.     2. Diffuse subcutaneous and muscle edema about the calf. No fluid collections seen.    VASCULAR STUDIES: No pertinent studies found in epic    LAST  WOUND CULTURE:  DATE :   Lab Results   Component Value Date/Time    CULTRSULT No growth at 72 hours. 2024 11:00 PM         ASSESSMENT AND PLAN:   1. Open wound of left lower leg, subsequent encounter    2/15/2024:   - Wound resolved today. Covered with thin epithelium.  - No excisional debridement required today as wound has resolved  - Due to resolution of wound, will discharge from clinic  - Encourage moisturizing lower extremity to keep new skin supple.  - Patient counseled that she should obtain new referral should wound reopen.    2. Edema of both lower legs    2/15/2024: Nonpitting edema bilateral lower extremities  - Patient would likely benefit from compression stockings, 15 to 20  mmHg.  - Counseled today on venous insufficiency and risk for wounds  - Patient counseled on compression and reducing wound development. Patient does not want to consider compression socks at this time. She was given handout discussing benefits of compression. Recommend leg elevation throughout the day to help with edema management.    PATIENT EDUCATION  - Importance of adequate nutrition for wound healing  -Advised to go to ER for any increased redness, swelling, drainage, or odor, or if patient develops fever, chills, nausea or vomiting.     My total time spent caring for the patient on the day of the encounter was 20 minutes, reviewing history, assessment, counseling and education, and coordination of care.  This does not include time spent on separately billable procedures/tests.    Please note that this note may have been created using voice recognition software. I have worked with technical experts from [facility] to optimize the interface.  I have made every reasonable attempt to correct obvious errors, but there may be errors of grammar and possibly content that I did not discover before finalizing the note.    N

## 2024-02-15 NOTE — PATIENT INSTRUCTIONS
-Keep your wound dressing clean, dry, and intact.    -Change your dressing if it becomes soiled, soaked, or falls off.    -Should you experience any significant changes in your wound(s), such as infection (redness, swelling, localized heat, increased pain, fever > 101 F, chills) or have any questions regarding your home care instructions, please contact the wound center at [phone]. If after hours, contact your primary care physician or go to the hospital emergency room.

## 2024-02-22 ENCOUNTER — APPOINTMENT (OUTPATIENT)
Dept: WOUND CARE | Facility: MEDICAL CENTER | Age: 89
End: 2024-02-22
Attending: INTERNAL MEDICINE
Payer: MEDICARE

## 2024-03-07 ENCOUNTER — HOSPITAL ENCOUNTER (OUTPATIENT)
Dept: LAB | Facility: MEDICAL CENTER | Age: 89
End: 2024-03-07
Attending: FAMILY MEDICINE
Payer: MEDICARE

## 2024-03-07 LAB
ALBUMIN SERPL BCP-MCNC: 4.4 G/DL (ref 3.2–4.9)
ALBUMIN/GLOB SERPL: 1.5 G/DL
ALP SERPL-CCNC: 41 U/L (ref 30–99)
ALT SERPL-CCNC: 12 U/L (ref 2–50)
ANION GAP SERPL CALC-SCNC: 12 MMOL/L (ref 7–16)
APPEARANCE UR: CLEAR
AST SERPL-CCNC: 30 U/L (ref 12–45)
BACTERIA #/AREA URNS HPF: NEGATIVE /HPF
BILIRUB SERPL-MCNC: 0.5 MG/DL (ref 0.1–1.5)
BILIRUB UR QL STRIP.AUTO: NEGATIVE
BUN SERPL-MCNC: 32 MG/DL (ref 8–22)
CALCIUM ALBUM COR SERPL-MCNC: 9.6 MG/DL (ref 8.5–10.5)
CALCIUM SERPL-MCNC: 9.9 MG/DL (ref 8.5–10.5)
CHLORIDE SERPL-SCNC: 101 MMOL/L (ref 96–112)
CO2 SERPL-SCNC: 27 MMOL/L (ref 20–33)
COLOR UR: YELLOW
CREAT SERPL-MCNC: 1.17 MG/DL (ref 0.5–1.4)
EPI CELLS #/AREA URNS HPF: NEGATIVE /HPF
GFR SERPLBLD CREATININE-BSD FMLA CKD-EPI: 44 ML/MIN/1.73 M 2
GLOBULIN SER CALC-MCNC: 2.9 G/DL (ref 1.9–3.5)
GLUCOSE SERPL-MCNC: 121 MG/DL (ref 65–99)
GLUCOSE UR STRIP.AUTO-MCNC: NEGATIVE MG/DL
HYALINE CASTS #/AREA URNS LPF: ABNORMAL /LPF
KETONES UR STRIP.AUTO-MCNC: ABNORMAL MG/DL
LEUKOCYTE ESTERASE UR QL STRIP.AUTO: ABNORMAL
MICRO URNS: ABNORMAL
NITRITE UR QL STRIP.AUTO: NEGATIVE
PH UR STRIP.AUTO: 6 [PH] (ref 5–8)
POTASSIUM SERPL-SCNC: 4.4 MMOL/L (ref 3.6–5.5)
PROT SERPL-MCNC: 7.3 G/DL (ref 6–8.2)
PROT UR QL STRIP: NEGATIVE MG/DL
RBC # URNS HPF: ABNORMAL /HPF
RBC UR QL AUTO: NEGATIVE
SODIUM SERPL-SCNC: 140 MMOL/L (ref 135–145)
SP GR UR STRIP.AUTO: 1.02
UROBILINOGEN UR STRIP.AUTO-MCNC: 0.2 MG/DL
WBC #/AREA URNS HPF: ABNORMAL /HPF

## 2024-03-07 PROCEDURE — 81001 URINALYSIS AUTO W/SCOPE: CPT

## 2024-03-07 PROCEDURE — 36415 COLL VENOUS BLD VENIPUNCTURE: CPT

## 2024-03-07 PROCEDURE — 80053 COMPREHEN METABOLIC PANEL: CPT

## 2024-03-14 ENCOUNTER — HOSPITAL ENCOUNTER (OUTPATIENT)
Dept: LAB | Facility: MEDICAL CENTER | Age: 89
End: 2024-03-14
Attending: INTERNAL MEDICINE
Payer: MEDICARE

## 2024-03-14 LAB
25(OH)D3 SERPL-MCNC: 65 NG/ML (ref 30–100)
BASOPHILS # BLD AUTO: 0.2 % (ref 0–1.8)
BASOPHILS # BLD: 0.01 K/UL (ref 0–0.12)
CA-I SERPL-SCNC: 1.2 MMOL/L (ref 1.1–1.3)
EOSINOPHIL # BLD AUTO: 0.1 K/UL (ref 0–0.51)
EOSINOPHIL NFR BLD: 1.7 % (ref 0–6.9)
ERYTHROCYTE [DISTWIDTH] IN BLOOD BY AUTOMATED COUNT: 48.2 FL (ref 35.9–50)
HCT VFR BLD AUTO: 46.1 % (ref 37–47)
HGB BLD-MCNC: 15.1 G/DL (ref 12–16)
IMM GRANULOCYTES # BLD AUTO: 0.01 K/UL (ref 0–0.11)
IMM GRANULOCYTES NFR BLD AUTO: 0.2 % (ref 0–0.9)
LYMPHOCYTES # BLD AUTO: 1.92 K/UL (ref 1–4.8)
LYMPHOCYTES NFR BLD: 33.2 % (ref 22–41)
MCH RBC QN AUTO: 30.1 PG (ref 27–33)
MCHC RBC AUTO-ENTMCNC: 32.8 G/DL (ref 32.2–35.5)
MCV RBC AUTO: 92 FL (ref 81.4–97.8)
MONOCYTES # BLD AUTO: 0.53 K/UL (ref 0–0.85)
MONOCYTES NFR BLD AUTO: 9.2 % (ref 0–13.4)
NEUTROPHILS # BLD AUTO: 3.22 K/UL (ref 1.82–7.42)
NEUTROPHILS NFR BLD: 55.5 % (ref 44–72)
NRBC # BLD AUTO: 0 K/UL
NRBC BLD-RTO: 0 /100 WBC (ref 0–0.2)
PLATELET # BLD AUTO: 199 K/UL (ref 164–446)
PMV BLD AUTO: 10.6 FL (ref 9–12.9)
RBC # BLD AUTO: 5.01 M/UL (ref 4.2–5.4)
T3FREE SERPL-MCNC: 2.72 PG/ML (ref 2–4.4)
T4 FREE SERPL-MCNC: 1.02 NG/DL (ref 0.93–1.7)
TSH SERPL DL<=0.005 MIU/L-ACNC: 1.8 UIU/ML (ref 0.38–5.33)
VIT B12 SERPL-MCNC: 453 PG/ML (ref 211–911)
WBC # BLD AUTO: 5.8 K/UL (ref 4.8–10.8)

## 2024-03-14 PROCEDURE — 82330 ASSAY OF CALCIUM: CPT

## 2024-03-14 PROCEDURE — 82607 VITAMIN B-12: CPT

## 2024-03-14 PROCEDURE — 85025 COMPLETE CBC W/AUTO DIFF WBC: CPT

## 2024-03-14 PROCEDURE — 82306 VITAMIN D 25 HYDROXY: CPT

## 2024-03-14 PROCEDURE — 84439 ASSAY OF FREE THYROXINE: CPT

## 2024-03-14 PROCEDURE — 80053 COMPREHEN METABOLIC PANEL: CPT

## 2024-03-14 PROCEDURE — 84443 ASSAY THYROID STIM HORMONE: CPT

## 2024-03-14 PROCEDURE — 36415 COLL VENOUS BLD VENIPUNCTURE: CPT

## 2024-03-14 PROCEDURE — 84481 FREE ASSAY (FT-3): CPT

## 2024-03-15 LAB
ALBUMIN SERPL BCP-MCNC: 4.2 G/DL (ref 3.2–4.9)
ALBUMIN/GLOB SERPL: 1.6 G/DL
ALP SERPL-CCNC: 37 U/L (ref 30–99)
ALT SERPL-CCNC: 12 U/L (ref 2–50)
ANION GAP SERPL CALC-SCNC: 13 MMOL/L (ref 7–16)
AST SERPL-CCNC: 28 U/L (ref 12–45)
BILIRUB SERPL-MCNC: 0.5 MG/DL (ref 0.1–1.5)
BUN SERPL-MCNC: 30 MG/DL (ref 8–22)
CALCIUM ALBUM COR SERPL-MCNC: 9.3 MG/DL (ref 8.5–10.5)
CALCIUM SERPL-MCNC: 9.5 MG/DL (ref 8.5–10.5)
CHLORIDE SERPL-SCNC: 101 MMOL/L (ref 96–112)
CO2 SERPL-SCNC: 27 MMOL/L (ref 20–33)
CREAT SERPL-MCNC: 1.15 MG/DL (ref 0.5–1.4)
GFR SERPLBLD CREATININE-BSD FMLA CKD-EPI: 45 ML/MIN/1.73 M 2
GLOBULIN SER CALC-MCNC: 2.6 G/DL (ref 1.9–3.5)
GLUCOSE SERPL-MCNC: 141 MG/DL (ref 65–99)
POTASSIUM SERPL-SCNC: 3.9 MMOL/L (ref 3.6–5.5)
PROT SERPL-MCNC: 6.8 G/DL (ref 6–8.2)
SODIUM SERPL-SCNC: 141 MMOL/L (ref 135–145)

## 2024-04-29 ENCOUNTER — HOSPITAL ENCOUNTER (OUTPATIENT)
Dept: LAB | Facility: MEDICAL CENTER | Age: 89
End: 2024-04-29
Attending: INTERNAL MEDICINE
Payer: MEDICARE

## 2024-04-29 LAB
T3FREE SERPL-MCNC: 3.05 PG/ML (ref 2–4.4)
T4 FREE SERPL-MCNC: 1.24 NG/DL (ref 0.93–1.7)
TSH SERPL DL<=0.005 MIU/L-ACNC: 1.11 UIU/ML (ref 0.38–5.33)

## 2024-04-29 PROCEDURE — 84481 FREE ASSAY (FT-3): CPT

## 2024-04-29 PROCEDURE — 36415 COLL VENOUS BLD VENIPUNCTURE: CPT

## 2024-04-29 PROCEDURE — 84443 ASSAY THYROID STIM HORMONE: CPT

## 2024-04-29 PROCEDURE — 84439 ASSAY OF FREE THYROXINE: CPT

## 2024-05-16 ENCOUNTER — NON-PROVIDER VISIT (OUTPATIENT)
Dept: WOUND CARE | Facility: MEDICAL CENTER | Age: 89
End: 2024-05-16
Attending: INTERNAL MEDICINE
Payer: MEDICARE

## 2024-05-16 RX ORDER — AMLODIPINE BESYLATE 5 MG/1
5 TABLET ORAL DAILY
COMMUNITY

## 2024-05-16 RX ORDER — LEVOTHYROXINE SODIUM 0.03 MG/1
12.5 TABLET ORAL
COMMUNITY

## 2024-05-16 RX ORDER — IPRATROPIUM BROMIDE 21 UG/1
SPRAY, METERED NASAL
COMMUNITY
Start: 2024-03-06

## 2024-05-16 NOTE — PATIENT INSTRUCTIONS
-Keep dressings clean and dry. Change dressings every if they become over saturated, soiled or fall off.     -If you need to change your dressings at home: Wash your wound with normal saline, wound cleanser, or unscented soap and water prior to applying your new dressings. Please avoid cleansing with hydrogen peroxide or rubbing alcohol. Hydrogen peroxide and rubbing alcohol are toxic to new tissue and skin cells.    -Avoid prolonged standing or sitting without elevating your legs.    -Should you experience any significant changes in your wound(s), such as signs of infection (increasing redness, swelling, localized heat, increased pain, fever > 101 F, chills) or have any questions regarding your home care instructions, please contact the wound center at [phone]. If after hours, contact your primary care physician or go to the hospital emergency room.     -If you are 5 or more minutes late for an appointment, we reserve the right to cancel and reschedule that appointment. Additionally, if you are habitually late or not showing (3 late cancellations and/or no shows), we reserve the right to cancel your remaining appointments and it will be your responsibility to obtain a new referral if services are still needed.

## 2024-05-16 NOTE — CERTIFICATION
Non Provider Encounter- Full Thickness wound    HISTORY OF PRESENT ILLNESS  Wound History:    START OF CARE IN CLINIC: 05/16/24    REFERRING PROVIDER: Stoney Barajas MD    WOUND- Full Thickness Wound   LOCATION: Left anterior LE proximal     Left anterior LE distal   HISTORY: Patient reports that on the 24th of April 2024 she struck her leg on her garbage bin causing wounds to her left anterior LE. She has been covering wounds with a silver gel dressing and a silicone adhesive foam. Patient referred to [facility] outpatient [facility] care for management.     Pertinent Labs and Diagnostics:    Labs:     A1c:   Lab Results   Component Value Date/Time    HBA1C 6.1 (H) 02/06/2024 09:19 AM          IMAGING: NA  VASCULAR STUDIES: NA  LAST  WOUND CULTURE:  DATE : NA           Patient allergies and medications reviewed via Epic.     Wound Assessment:    Wound 05/16/24 Full Thickness Wound Pretibial;Leg Anterior;Proximal Left (Active)   Wound Image   05/16/24 1534   Site Assessment Red;Yellow 05/16/24 1534   Periwound Assessment Fragile 05/16/24 1534   Margins Attached edges 05/16/24 1534   Drainage Amount Moderate 05/16/24 1534   Drainage Description Serosanguineous 05/16/24 1534   Treatments Cleansed;Topical Lidocaine;CSWD - Conservative Sharp Wound Debridement;Site care 05/16/24 1534   Wound Cleansing Normal Saline Irrigation 05/16/24 1534   Periwound Protectant No-sting Skin Prep 05/16/24 1534   Dressing Cleansing/Solutions Not Applicable 05/16/24 1534   Dressing Options Mepitel One;Hydrofiber Silver;Silicone Adhesive Foam 05/16/24 1534   Dressing Change/Treatment Frequency Weekly, and As Needed 05/16/24 1534   Non-staged Wound Description Full thickness 05/16/24 1534   Wound Length (cm) 0.7 cm 05/16/24 1534   Wound Width (cm) 1.5 cm 05/16/24 1534   Wound Depth (cm) 0.1 cm 05/16/24 1534   Wound Surface Area (cm^2) 1.05 cm^2 05/16/24 1534   Wound Volume (cm^3) 0.105 cm^3 05/16/24 1534   Post-Procedure Length (cm) 0.7 cm  05/16/24 1534   Post-Procedure Width (cm) 1.5 cm 05/16/24 1534   Post-Procedure Depth (cm) 0.1 cm 05/16/24 1534   Post-Procedure Surface Area (cm^2) 1.05 cm^2 05/16/24 1534   Post-Procedure Volume (cm^3) 0.105 cm^3 05/16/24 1534   Tunneling (cm) 0 cm 05/16/24 1534   Undermining (cm) 0 cm 05/16/24 1534   Wound Odor None 05/16/24 1534   Pulses Left;DP;2+ 05/16/24 1534   Exposed Structures None 05/16/24 1534       Wound 05/16/24 Full Thickness Wound Pretibial;Leg Anterior;Distal Left (Active)   Wound Image   05/16/24 1534   Site Assessment Red;Yellow 05/16/24 1534   Periwound Assessment Fragile 05/16/24 1534   Margins Attached edges 05/16/24 1534   Drainage Amount Moderate 05/16/24 1534   Drainage Description Serosanguineous 05/16/24 1534   Treatments Cleansed;Topical Lidocaine;CSWD - Conservative Sharp Wound Debridement;Site care 05/16/24 1534   Wound Cleansing Normal Saline Irrigation 05/16/24 1534   Periwound Protectant No-sting Skin Prep 05/16/24 1534   Non-staged Wound Description Full thickness 05/16/24 1534   Wound Length (cm) 0.5 cm 05/16/24 1534   Wound Width (cm) 1 cm 05/16/24 1534   Wound Depth (cm) 0.1 cm 05/16/24 1534   Wound Surface Area (cm^2) 0.5 cm^2 05/16/24 1534   Wound Volume (cm^3) 0.05 cm^3 05/16/24 1534   Post-Procedure Length (cm) 0.5 cm 05/16/24 1534   Post-Procedure Width (cm) 1 cm 05/16/24 1534   Post-Procedure Depth (cm) 0.1 cm 05/16/24 1534   Post-Procedure Surface Area (cm^2) 0.5 cm^2 05/16/24 1534   Post-Procedure Volume (cm^3) 0.05 cm^3 05/16/24 1534   Tunneling (cm) 0 cm 05/16/24 1534   Undermining (cm) 0 cm 05/16/24 1534   Wound Odor None 05/16/24 1534   Pulses Left;DP;2+ 05/16/24 1534   Exposed Structures None 05/16/24 1534       Procedures:    -2% viscous lidocaine applied topically to wound bed for approximately 5 minutes prior to debridement  -Curette used to debride wound beds. Entire surface of wound, 2cm² debrided.    -Refer to flowsheet for wound care details.       PATIENT  EDUCATION  -Advised to go to ER for any increased redness, swelling, drainage or odor, or if patient develops fever, chills, nausea or vomiting.  -Importance of adequate nutrition for wound healing  -Increase protein intake (unless contraindicated by renal status)

## 2024-05-21 ENCOUNTER — OFFICE VISIT (OUTPATIENT)
Dept: WOUND CARE | Facility: MEDICAL CENTER | Age: 89
End: 2024-05-21
Attending: INTERNAL MEDICINE
Payer: MEDICARE

## 2024-05-21 VITALS
RESPIRATION RATE: 16 BRPM | TEMPERATURE: 97.4 F | DIASTOLIC BLOOD PRESSURE: 63 MMHG | SYSTOLIC BLOOD PRESSURE: 109 MMHG | OXYGEN SATURATION: 90 % | HEART RATE: 65 BPM

## 2024-05-21 DIAGNOSIS — S81.802D OPEN WOUND OF LEFT LOWER LEG, SUBSEQUENT ENCOUNTER: ICD-10-CM

## 2024-05-21 DIAGNOSIS — R60.0 EDEMA OF BOTH LOWER LEGS: ICD-10-CM

## 2024-05-21 PROCEDURE — 3078F DIAST BP <80 MM HG: CPT | Performed by: NURSE PRACTITIONER

## 2024-05-21 PROCEDURE — 11042 DBRDMT SUBQ TIS 1ST 20SQCM/<: CPT | Performed by: NURSE PRACTITIONER

## 2024-05-21 PROCEDURE — 3074F SYST BP LT 130 MM HG: CPT | Performed by: NURSE PRACTITIONER

## 2024-05-21 PROCEDURE — 99214 OFFICE O/P EST MOD 30 MIN: CPT | Performed by: NURSE PRACTITIONER

## 2024-05-22 ENCOUNTER — APPOINTMENT (OUTPATIENT)
Dept: WOUND CARE | Facility: MEDICAL CENTER | Age: 89
End: 2024-05-22
Attending: INTERNAL MEDICINE
Payer: MEDICARE

## 2024-05-22 NOTE — PATIENT INSTRUCTIONS
After Visit Summary Wound Care Instructions    Nutrition - Patient instructed increased protein diet unless contraindicated in renal failure (meat, eggs, fish, yogurt, cottage cheese, beans), use of multivitamin with minerals and Arginaid supplementation (check if ok with Primary Care Provider first).    Infection -  instructed signs and symptoms of infection, increased redness and swelling, localized heat over wound and surrounding area/fever/chills/nausea and vomiting, when to call doctor or go to Emergency Room.     Questions - should you have any questions regarding your home care instructions, please contact the wound center at [phone]. If after hours, contact your primary care physician or go to the hospital emergency room.

## 2024-05-22 NOTE — PROGRESS NOTES
Tubigrip D placed on left leg.  Pt stated she will not keep it on because she is unable to get it on herself and is fearful she will scratch her leg and get more wounds.

## 2024-05-25 ASSESSMENT — ENCOUNTER SYMPTOMS
EYES NEGATIVE: 1
WEAKNESS: 0
WHEEZING: 0
NAUSEA: 0
FEVER: 0
SHORTNESS OF BREATH: 0
DEPRESSION: 0
CHILLS: 0
PALPITATIONS: 0
NERVOUS/ANXIOUS: 0
CLAUDICATION: 0
VOMITING: 0
COUGH: 0
DIAPHORESIS: 0
BACK PAIN: 0
FALLS: 0

## 2024-05-25 NOTE — PROGRESS NOTES
Provider Encounter- Full Thickness wound    HISTORY OF PRESENT ILLNESS  Wound History:    START OF CARE IN CLINIC: 5/16/2024    REFERRING PROVIDER: Stoney Barajas MD      WOUND- Full Thickness Wound   LOCATION: Left anterior proximal lower leg     Left anterior distal lower leg     HISTORY: Around 4/24/2024, patient injured her left leg on her garbage can.  She has been treating the wound with silver gel dressing and adhesive foam.  She followed up with her PCP who referred her to [facility] wound care clinic for further treatment and care.  She was not started on any antibiotics.    Pertinent Medical History: COPD, hypertension, severe protein calorie malnutrition    TOBACCO USE:   Former      Patient's problem list, allergies, and current medications reviewed and updated in Epic    Interval History:  5/25/2024: Initial provider Clinic visit with Mi COLLINS, BAILEEP-BC, CWON, CFCN.  Pt denies fevers, chills, nausea, vomiting.  Ulcers decreased in area.        REVIEW OF SYSTEMS:   Review of Systems   Constitutional:  Negative for chills, diaphoresis and fever.   HENT: Negative.     Eyes: Negative.    Respiratory:  Negative for cough, shortness of breath and wheezing.    Cardiovascular:  Positive for leg swelling. Negative for chest pain, palpitations and claudication.   Gastrointestinal:  Negative for nausea and vomiting.   Musculoskeletal:  Negative for back pain, falls and joint pain.   Skin:  Negative for itching and rash.        Left shin wounds   Neurological:  Negative for weakness.   Psychiatric/Behavioral:  Negative for depression. The patient is not nervous/anxious.        PHYSICAL EXAMINATION:   /63   Pulse 65   Temp 36.3 °C (97.4 °F) (Temporal)   Resp 16   SpO2 90%     Physical Exam  Vitals reviewed.   Cardiovascular:      Rate and Rhythm: Normal rate.      Comments: Palpable pedal pulses to left foot  Musculoskeletal:         General: Swelling present.      Right lower leg: Edema  present.      Left lower leg: Edema present.   Skin:     Comments: Left anterior lower leg proximal and distal ulcers: Full-thickness, heavy slough, no evidence of infection   Neurological:      General: No focal deficit present.      Mental Status: She is alert.   Psychiatric:         Mood and Affect: Mood normal.         Behavior: Behavior normal.         WOUND ASSESSMENT  Wound 05/16/24 Full Thickness Wound Pretibial;Leg Anterior;Proximal Left (Active)   Wound Image    05/21/24 1600   Site Assessment Red;Yellow 05/21/24 1600   Periwound Assessment Fragile 05/21/24 1600   Margins Attached edges 05/21/24 1600   Drainage Amount Small 05/21/24 1600   Drainage Description Serosanguineous 05/21/24 1600   Treatments Cleansed;Topical Lidocaine;Provider debridement 05/21/24 1600   Wound Cleansing Normal Saline Irrigation 05/21/24 1600   Periwound Protectant No-sting Skin Prep 05/21/24 1600   Dressing Cleansing/Solutions Not Applicable 05/21/24 1600   Dressing Options Mepitel One;Hydrofiber Silver;Silicone Adhesive Foam;Tubigrip 05/21/24 1600   Dressing Change/Treatment Frequency Weekly, and As Needed 05/21/24 1600   Non-staged Wound Description Full thickness 05/21/24 1600   Wound Length (cm) 0.7 cm 05/21/24 1600   Wound Width (cm) 1.3 cm 05/21/24 1600   Wound Depth (cm) 0.1 cm 05/21/24 1600   Wound Surface Area (cm^2) 0.91 cm^2 05/21/24 1600   Wound Volume (cm^3) 0.091 cm^3 05/21/24 1600   Post-Procedure Length (cm) 0.7 cm 05/21/24 1600   Post-Procedure Width (cm) 1.3 cm 05/21/24 1600   Post-Procedure Depth (cm) 0.1 cm 05/21/24 1600   Post-Procedure Surface Area (cm^2) 0.91 cm^2 05/21/24 1600   Post-Procedure Volume (cm^3) 0.091 cm^3 05/21/24 1600   Wound Healing % 13 05/21/24 1600   Tunneling (cm) 0 cm 05/21/24 1600   Undermining (cm) 0 cm 05/21/24 1600   Wound Odor None 05/21/24 1600   Pulses Left;DP;2+ 05/16/24 1534   Exposed Structures None 05/21/24 1600   Number of days: 9       Wound 05/16/24 Full Thickness Wound  Pretibial;Leg Anterior;Distal Left (Active)   Wound Image    05/21/24 1600   Site Assessment Red;Yellow 05/21/24 1600   Periwound Assessment Fragile 05/21/24 1600   Margins Attached edges 05/21/24 1600   Drainage Amount Small 05/21/24 1600   Drainage Description Serosanguineous 05/21/24 1600   Treatments Cleansed;Topical Lidocaine;Provider debridement 05/21/24 1600   Wound Cleansing Normal Saline Irrigation 05/21/24 1600   Periwound Protectant No-sting Skin Prep 05/21/24 1600   Dressing Changed Changed 05/21/24 1600   Dressing Options Mepitel One;Hydrofiber Silver;Silicone Adhesive Foam;Tubigrip 05/21/24 1600   Non-staged Wound Description Full thickness 05/21/24 1600   Wound Length (cm) 0.5 cm 05/21/24 1600   Wound Width (cm) 0.8 cm 05/21/24 1600   Wound Depth (cm) 0.1 cm 05/21/24 1600   Wound Surface Area (cm^2) 0.4 cm^2 05/21/24 1600   Wound Volume (cm^3) 0.04 cm^3 05/21/24 1600   Post-Procedure Length (cm) 0.6 cm 05/21/24 1600   Post-Procedure Width (cm) 0.8 cm 05/21/24 1600   Post-Procedure Depth (cm) 0.1 cm 05/21/24 1600   Post-Procedure Surface Area (cm^2) 0.48 cm^2 05/21/24 1600   Post-Procedure Volume (cm^3) 0.048 cm^3 05/21/24 1600   Wound Healing % 20 05/21/24 1600   Tunneling (cm) 0 cm 05/21/24 1600   Undermining (cm) 0 cm 05/21/24 1600   Wound Odor None 05/21/24 1600   Pulses Left;DP;2+ 05/16/24 1534   Exposed Structures None 05/21/24 1600   Number of days: 9       PROCEDURE:   PROCEDURE:   -Curette used to debride wound bed.  Excisional debridement was performed to remove devitalized tissue until healthy, bleeding tissue was visualized.   Entire surface of wound, 1.39 cm2 debrided.  Tissue debrided into the subcutaneous layer.  -Bleeding controlled with manual pressure.    -Wound care completed by wound RN, refer to flowsheet  -Patient tolerated the procedure well, without c/o pain or discomfort.       Pertinent Labs and Diagnostics:    Labs:     A1c:   Lab Results   Component Value Date/Time    HBA1C  6.1 (H) 02/06/2024 09:19 AM          IMAGING: No results found.    VASCULAR STUDIES: No results found.    LAST  WOUND CULTURE:   Lab Results   Component Value Date/Time    CULTRSULT No growth at 72 hours. 01/16/2024 11:00 PM              ASSESSMENT AND PLAN:     1. Open wound of left lower leg, subsequent encounter  Injured 4/24/2024 from garbage can    5/21/2024: Initial provider visit, both ulcers have decreased in area, heavy slough  - Excisional debridement performed today.  Medically necessary to promote wound healing.  -Patient to follow-up weekly to wound care clinic for assessment and debridement  - Patient to change dressing 1-2 times in between clinic appointments    Wound care: Mepitel 1, Hydrofiber silver, adhesive foam, Tubigrip    2. Edema of both lower legs    5/21/2024: Previous patient clinic, discharged in February 2024.   -Reviewed venous insufficiency and risk for wounds.    -Counseled on compression and reducing wound development.  - Continue Tubigrip and BLE  - Continue leg elevation throughout day to help with edema control.            PATIENT EDUCATION  - Importance of adequate nutrition for wound healing  -Advised to go to ER for any increased redness, swelling, drainage, or odor, or if patient develops fever, chills, nausea or vomiting.     My total time spent caring for the patient on the day of the encounter was 30 minutes, reviewing history, assessment, counseling and education, and coordination of care.  This does not include time spent on separately billable procedures/tests.  .       Please note that this note may have been created using voice recognition software. I have worked with technical experts from [facility] to optimize the interface.  I have made every reasonable attempt to correct obvious errors, but there may be errors of grammar and possibly content that I did not discover before finalizing the note.

## 2024-05-30 ENCOUNTER — OFFICE VISIT (OUTPATIENT)
Dept: WOUND CARE | Facility: MEDICAL CENTER | Age: 89
End: 2024-05-30
Attending: INTERNAL MEDICINE
Payer: MEDICARE

## 2024-05-30 VITALS
HEART RATE: 69 BPM | TEMPERATURE: 97.5 F | RESPIRATION RATE: 16 BRPM | OXYGEN SATURATION: 91 % | SYSTOLIC BLOOD PRESSURE: 146 MMHG | DIASTOLIC BLOOD PRESSURE: 70 MMHG

## 2024-05-30 DIAGNOSIS — R60.0 EDEMA OF BOTH LOWER LEGS: ICD-10-CM

## 2024-05-30 DIAGNOSIS — S81.802D OPEN WOUND OF LEFT LOWER LEG, SUBSEQUENT ENCOUNTER: ICD-10-CM

## 2024-05-30 NOTE — PATIENT INSTRUCTIONS
-Keep dressings clean and dry. Change dressings if they become over saturated, soiled or fall off.     -If you need to change your dressings at home: Wash your wound with normal saline, wound cleanser, or unscented soap and water prior to applying your new dressings. Please avoid cleansing with hydrogen peroxide or rubbing alcohol. Hydrogen peroxide and rubbing alcohol are toxic to new tissue and skin cells.    -Avoid prolonged standing or sitting without elevating your legs.    -Remove your compression garments if you have severe pain, severe swelling, numbness, color change, or temperature change in your toes. If you need to remove your compression garments, do so by unrolling them. Do not cut the compression garments off, this is to prevent cutting yourself on accident.    -Should you experience any significant changes in your wounds, such as signs of infection (increasing redness, swelling, localized heat, increased pain, fever > 101 F, chills) or have any questions regarding your home care instructions, please contact the wound center at [phone]. If after hours, contact your primary care physician or go to the hospital emergency room.     -If you are 5 or more minutes late for an appointment, we reserve the right to cancel and reschedule that appointment. Additionally, if you are habitually late or not showing (3 late cancellations and/or no shows), we reserve the right to cancel your remaining appointments and it will be your responsibility to obtain a new referral if services are still needed.

## 2024-05-30 NOTE — PROGRESS NOTES
Provider Encounter- Full Thickness wound    HISTORY OF PRESENT ILLNESS  Wound History:    START OF CARE IN CLINIC: 5/16/2024    REFERRING PROVIDER: Stoney Barajas MD      WOUND- Full Thickness Wound   LOCATION: Left anterior proximal lower leg     Left anterior distal lower leg     HISTORY: Around 4/24/2024, patient injured her left leg on her garbage can.  She has been treating the wound with silver gel dressing and adhesive foam.  She followed up with her PCP who referred her to [facility] wound care clinic for further treatment and care.  She was not started on any antibiotics.    Pertinent Medical History: COPD, hypertension, severe protein calorie malnutrition    TOBACCO USE:   Former      Patient's problem list, allergies, and current medications reviewed and updated in Epic    Interval History:  5/25/2024: Initial provider Clinic visit with MAGALI Barnard, MELVIN HARLEY.  Pt denies fevers, chills, nausea, vomiting.  Ulcers decreased in area.    5/30/24: Clinic visit with MAGALI Barnard, MELVIN HARLEY.  Pt denies fevers, chills, nausea, vomiting. Distal wound slightly crusted, proximal ulcer decreased in area.  Continues to have edema to periwound.  Reports she is wearing Tubigrip.        REVIEW OF SYSTEMS:   Unchanged from previous wound clinic assessment on 5/25/2024, except as noted in interval history above      PHYSICAL EXAMINATION:   BP (!) 146/70 Comment: RN  notified  Pulse 69   Temp 36.4 °C (97.5 °F) (Temporal)   Resp 16   SpO2 91%     Physical Exam  Vitals reviewed.   Cardiovascular:      Rate and Rhythm: Normal rate.      Comments: Palpable pedal pulses to left foot  Musculoskeletal:         General: Swelling present.      Right lower leg: Edema present.      Left lower leg: Edema present.      Comments: +2 nonpitting edema LLE around wounds   Skin:     Comments: Left anterior lower leg proximal and distal ulcers: Full-thickness, both ulcers decreased in area,  minimal slough proximal wound, dried exudate distal wound, moderate serosanguineous drainage, no evidence of infection   Neurological:      General: No focal deficit present.      Mental Status: She is alert.   Psychiatric:         Mood and Affect: Mood normal.         Behavior: Behavior normal.         WOUND ASSESSMENT  Wound 05/16/24 Full Thickness Wound Pretibial;Leg Anterior;Proximal Left (Active)   Wound Image    05/30/24 1545   Site Assessment Red;Yellow 05/30/24 1545   Periwound Assessment Fragile;Edema 05/30/24 1545   Margins Epibole (rolled edges) 05/30/24 1545   Drainage Amount Small 05/30/24 1545   Drainage Description Serosanguineous 05/30/24 1545   Treatments Cleansed;Topical Lidocaine;Provider debridement 05/30/24 1545   Wound Cleansing Hypochlorus Acid 05/30/24 1545   Periwound Protectant No-sting Skin Prep 05/30/24 1545   Dressing Cleansing/Solutions Normal Saline 05/30/24 1545   Dressing Options Collagen Dressing;Silicone Adhesive Foam;Tubigrip 05/30/24 1545   Dressing Change/Treatment Frequency Weekly, and As Needed 05/21/24 1600   Non-staged Wound Description Full thickness 05/30/24 1545   Wound Length (cm) 0.5 cm 05/30/24 1545   Wound Width (cm) 0.8 cm 05/30/24 1545   Wound Depth (cm) 0.2 cm 05/30/24 1545   Wound Surface Area (cm^2) 0.4 cm^2 05/30/24 1545   Wound Volume (cm^3) 0.08 cm^3 05/30/24 1545   Post-Procedure Length (cm) 0.5 cm 05/30/24 1545   Post-Procedure Width (cm) 1 cm 05/30/24 1545   Post-Procedure Depth (cm) 0.3 cm 05/30/24 1545   Post-Procedure Surface Area (cm^2) 0.5 cm^2 05/30/24 1545   Post-Procedure Volume (cm^3) 0.15 cm^3 05/30/24 1545   Wound Healing % 24 05/30/24 1545   Tunneling (cm) 0 cm 05/30/24 1545   Undermining (cm) 0 cm 05/30/24 1545   Wound Odor None 05/30/24 1545   Pulses DP;2+ 05/30/24 1545   Exposed Structures None 05/30/24 1545   Number of days: 14       Wound 05/16/24 Full Thickness Wound Pretibial;Leg Anterior;Distal Left (Active)   Wound Image    05/30/24  1545   Site Assessment Other (Comment) 05/30/24 1545   Periwound Assessment Fragile;Edema 05/30/24 1545   Margins Attached edges 05/30/24 1545   Drainage Amount Small 05/30/24 1545   Drainage Description Serosanguineous 05/30/24 1545   Treatments Cleansed;Topical Lidocaine;Provider debridement 05/30/24 1545   Wound Cleansing Hypochlorus Acid 05/30/24 1545   Periwound Protectant No-sting Skin Prep 05/30/24 1545   Dressing Changed Changed 05/21/24 1600   Dressing Cleansing/Solutions Not Applicable 05/30/24 1545   Dressing Options Collagen Dressing;Silicone Adhesive Foam;Tubigrip 05/30/24 1545   Non-staged Wound Description Full thickness 05/30/24 1545   Wound Length (cm) 0.5 cm 05/21/24 1600   Wound Width (cm) 0.8 cm 05/21/24 1600   Wound Depth (cm) 0.1 cm 05/21/24 1600   Wound Surface Area (cm^2) 0.4 cm^2 05/21/24 1600   Wound Volume (cm^3) 0.04 cm^3 05/21/24 1600   Post-Procedure Length (cm) 0.2 cm 05/30/24 1545   Post-Procedure Width (cm) 0.5 cm 05/30/24 1545   Post-Procedure Depth (cm) 0.1 cm 05/30/24 1545   Post-Procedure Surface Area (cm^2) 0.1 cm^2 05/30/24 1545   Post-Procedure Volume (cm^3) 0.01 cm^3 05/30/24 1545   Wound Healing % 20 05/21/24 1600   Tunneling (cm) 0 cm 05/30/24 1545   Undermining (cm) 0 cm 05/30/24 1545   Wound Odor None 05/30/24 1545   Pulses DP;2+ 05/30/24 1545   Exposed Structures None 05/30/24 1545   Number of days: 14       PROCEDURE:   PROCEDURE:   -Curette used to debride wound bed.  Excisional debridement was performed to remove devitalized tissue until healthy, bleeding tissue was visualized.   Entire surface of wound, 0.6 cm2 debrided.  Tissue debrided into the subcutaneous layer.  -Bleeding controlled with manual pressure.    -Wound care completed by wound RN, refer to flowsheet  -Patient tolerated the procedure well, without c/o pain or discomfort.       Pertinent Labs and Diagnostics:    Labs:     A1c:   Lab Results   Component Value Date/Time    HBA1C 6.1 (H) 02/06/2024 09:19  AM          IMAGING: No results found.    VASCULAR STUDIES: No results found.    LAST  WOUND CULTURE:   Lab Results   Component Value Date/Time    CULTRSULT No growth at 72 hours. 01/16/2024 11:00 PM              ASSESSMENT AND PLAN:     1. Open wound of left lower leg, subsequent encounter  Injured 4/24/2024 from garbage can    5/30/2024:  both ulcers have decreased in area, decreased slough proximal wound, distal wound with dried exudate, thin crust beginning to form  - Excisional debridement performed today.  Medically necessary to promote wound healing.  -Patient to follow-up weekly to wound care clinic for assessment and debridement  - Patient to change dressing 1-2 times in between clinic appointments    Wound care: Gracia, adhesive foam, Tubigrip    2. Edema of both lower legs    5/30/2024: Previous patient clinic, discharged in February 2024.   -Reviewed venous insufficiency and risk for wounds.    -Counseled on compression and reducing wound development.  - Continue Tubigrip BLE  - Continue leg elevation throughout day to help with edema control.            PATIENT EDUCATION  - Importance of adequate nutrition for wound healing  -Advised to go to ER for any increased redness, swelling, drainage, or odor, or if patient develops fever, chills, nausea or vomiting.           Please note that this note may have been created using voice recognition software. I have worked with technical experts from [employer] to optimize the interface.  I have made every reasonable attempt to correct obvious errors, but there may be errors of grammar and possibly content that I did not discover before finalizing the note.

## 2024-06-06 ENCOUNTER — OFFICE VISIT (OUTPATIENT)
Dept: WOUND CARE | Facility: MEDICAL CENTER | Age: 89
End: 2024-06-06
Attending: FAMILY MEDICINE
Payer: MEDICARE

## 2024-06-06 VITALS
TEMPERATURE: 98.2 F | HEART RATE: 72 BPM | DIASTOLIC BLOOD PRESSURE: 56 MMHG | OXYGEN SATURATION: 92 % | SYSTOLIC BLOOD PRESSURE: 113 MMHG | RESPIRATION RATE: 16 BRPM

## 2024-06-06 DIAGNOSIS — S81.802D OPEN WOUND OF LEFT LOWER LEG, SUBSEQUENT ENCOUNTER: ICD-10-CM

## 2024-06-06 DIAGNOSIS — R60.0 EDEMA OF BOTH LOWER LEGS: ICD-10-CM

## 2024-06-06 PROCEDURE — 11042 DBRDMT SUBQ TIS 1ST 20SQCM/<: CPT

## 2024-06-06 PROCEDURE — 3078F DIAST BP <80 MM HG: CPT | Performed by: NURSE PRACTITIONER

## 2024-06-06 PROCEDURE — 3074F SYST BP LT 130 MM HG: CPT | Performed by: NURSE PRACTITIONER

## 2024-06-06 PROCEDURE — 11042 DBRDMT SUBQ TIS 1ST 20SQCM/<: CPT | Performed by: NURSE PRACTITIONER

## 2024-06-06 NOTE — PROGRESS NOTES
Provider Encounter- Full Thickness wound    HISTORY OF PRESENT ILLNESS  Wound History:    START OF CARE IN CLINIC: 5/16/2024    REFERRING PROVIDER: Stoney Barajas MD      WOUND- Full Thickness Wound   LOCATION: Left anterior proximal lower leg     Left anterior distal lower leg     HISTORY: Around 4/24/2024, patient injured her left leg on her garbage can.  She has been treating the wound with silver gel dressing and adhesive foam.  She followed up with her PCP who referred her to [facility] wound care clinic for further treatment and care.  She was not started on any antibiotics.    Pertinent Medical History: COPD, hypertension, severe protein calorie malnutrition    TOBACCO USE:   Former      Patient's problem list, allergies, and current medications reviewed and updated in Epic    Interval History:  5/25/2024: Initial provider Clinic visit with MAGALI Barnard CWON, CFCN.  Pt denies fevers, chills, nausea, vomiting.  Ulcers decreased in area.    5/30/24: Clinic visit with MAGALI Barnard CWON, CFCN.  Pt denies fevers, chills, nausea, vomiting. Distal wound slightly crusted, proximal ulcer decreased in area.  Continues to have edema to periwound.  Reports she is wearing Tubigrip.    6/6/24: Clinic visit with MAGALI Barnard CWON, CFCN.  Pt denies fevers, chills, nausea, vomiting.  Both ulcers decreased in area.  Distal ulcer crusted with adhered collagen.       REVIEW OF SYSTEMS:   Unchanged from previous wound clinic assessment on 5/30/2024, except as noted in interval history above      PHYSICAL EXAMINATION:   /56   Pulse 72   Temp 36.8 °C (98.2 °F) (Temporal)   Resp 16   SpO2 92%     Physical Exam  Vitals reviewed.   Cardiovascular:      Rate and Rhythm: Normal rate.      Comments: Palpable pedal pulses to left foot  Musculoskeletal:         General: Swelling present.      Right lower leg: Edema present.      Left lower leg: Edema present.       Comments: Edema improved around LLE wounds, +1 nonpitting   Skin:     Comments: Left anterior lower leg proximal and distal ulcers: Full-thickness, both ulcers decreased in area, distal ulcer with dried crust, ulcer with resolved once removed.  Proximal ulcer with dried exudate, scant senescent tissue, no evidence of infection, minimal drainage   Neurological:      General: No focal deficit present.      Mental Status: She is alert.   Psychiatric:         Mood and Affect: Mood normal.         Behavior: Behavior normal.         WOUND ASSESSMENT  Wound 05/16/24 Full Thickness Wound Pretibial;Leg Anterior;Proximal Left (Active)   Wound Image    06/06/24 1500   Site Assessment Crusted 06/06/24 1500   Periwound Assessment Dry;Flaky;Fragile;Edema 06/06/24 1500   Margins Attached edges 06/06/24 1500   Drainage Amount Scant 06/06/24 1500   Drainage Description Serosanguineous 06/06/24 1500   Treatments Cleansed;Provider debridement 06/06/24 1500   Wound Cleansing Normal Saline Irrigation 06/06/24 1500   Periwound Protectant Skin Moisturizer 06/06/24 1500   Dressing Cleansing/Solutions Not Applicable 06/06/24 1500   Dressing Options Silicone Adhesive Foam;Tubigrip 06/06/24 1500   Dressing Change/Treatment Frequency Weekly, and As Needed 06/06/24 1500   Non-staged Wound Description Full thickness 06/06/24 1500   Wound Length (cm) 0.5 cm 05/30/24 1545   Wound Width (cm) 0.8 cm 05/30/24 1545   Wound Depth (cm) 0.2 cm 05/30/24 1545   Wound Surface Area (cm^2) 0.4 cm^2 05/30/24 1545   Wound Volume (cm^3) 0.08 cm^3 05/30/24 1545   Post-Procedure Length (cm) 0.2 cm 06/06/24 1500   Post-Procedure Width (cm) 0.6 cm 06/06/24 1500   Post-Procedure Depth (cm) 0.1 cm 06/06/24 1500   Post-Procedure Surface Area (cm^2) 0.12 cm^2 06/06/24 1500   Post-Procedure Volume (cm^3) 0.012 cm^3 06/06/24 1500   Wound Healing % 24 05/30/24 1545   Tunneling (cm) 0 cm 06/06/24 1500   Undermining (cm) 0 cm 06/06/24 1500   Wound Odor None 06/06/24 1500    Pulses DP;2+ 05/30/24 1545   Exposed Structures None 06/06/24 1500   Number of days: 21       PROCEDURE:   PROCEDURE:   -Curette used to debride wound bed.  Excisional debridement was performed to remove devitalized tissue until healthy, bleeding tissue was visualized.   Entire surface of wound,0.12 cm2 debrided.  Tissue debrided into the subcutaneous layer.  -Bleeding controlled with manual pressure.    -Wound care completed by wound RN, refer to flowsheet  -Patient tolerated the procedure well, without c/o pain or discomfort.       Pertinent Labs and Diagnostics:    Labs:     A1c:   Lab Results   Component Value Date/Time    HBA1C 6.1 (H) 02/06/2024 09:19 AM          IMAGING: No results found.    VASCULAR STUDIES: No results found.    LAST  WOUND CULTURE:   Lab Results   Component Value Date/Time    CULTRSULT No growth at 72 hours. 01/16/2024 11:00 PM              ASSESSMENT AND PLAN:     1. Open wound of left lower leg, subsequent encounter  Injured 4/24/2024 from garbage can    6/6/2024:  both ulcers have decreased in area, distal ulcer resolved.  Proximal ulcer remains - Excisional debridement performed today.  Medically necessary to promote wound healing.  -Patient to follow-up weekly to wound care clinic for assessment and debridement  - Patient to change dressing 1 times in between clinic appointments    Wound care: Dimethicone, adhesive foam, Tubigrip    2. Edema of both lower legs    6/6/2024: Edema controlled with Tubigrip  previous patient of clinic, discharged in February 2024.   -Reviewed venous insufficiency and risk for wounds.    -Counseled on compression and reducing wound development.  - Continue Tubigrip BLE  - Continue leg elevation throughout day to help with edema control.            PATIENT EDUCATION  - Importance of adequate nutrition for wound healing  -Advised to go to ER for any increased redness, swelling, drainage, or odor, or if patient develops fever, chills, nausea or vomiting.            Please note that this note may have been created using voice recognition software. I have worked with technical experts from [facility] to optimize the interface.  I have made every reasonable attempt to correct obvious errors, but there may be errors of grammar and possibly content that I did not discover before finalizing the note.

## 2024-06-06 NOTE — PATIENT INSTRUCTIONS
-Keep your wound dressing clean, dry, and intact.     -Should you experience any significant changes in your wound(s), such as infection (redness, swelling, localized heat, increased pain, fever > 101 F, chills) or have any questions regarding your home care instructions, please contact the wound center at [phone]. If after hours, contact your primary care physician or go to the hospital emergency room.

## 2024-06-12 ENCOUNTER — NON-PROVIDER VISIT (OUTPATIENT)
Dept: WOUND CARE | Facility: MEDICAL CENTER | Age: 89
End: 2024-06-12
Attending: FAMILY MEDICINE
Payer: MEDICARE

## 2024-06-12 PROCEDURE — 97597 DBRDMT OPN WND 1ST 20 CM/<: CPT

## 2024-06-12 NOTE — PATIENT INSTRUCTIONS
- Resolved wound be fragile for a few days, bathe and dry area gently, only ever regains a maximum of 80% of the tensile strength of the surrounding skin, remodeling of scar can continue for 6mo - a year. Contact PCP for a referral back her if any problems with area opening and draining again.    -Should you experience any significant changes in your wound(s), such as infection (redness, swelling, localized heat, increased pain, fever > 101 F, chills) or have any questions regarding your home care instructions, please contact the wound center at [phone]. If after hours, contact your primary care physician or go to the hospital emergency room.

## 2024-06-20 ENCOUNTER — APPOINTMENT (OUTPATIENT)
Dept: WOUND CARE | Facility: MEDICAL CENTER | Age: 89
End: 2024-06-20
Attending: FAMILY MEDICINE
Payer: MEDICARE

## 2024-06-28 ENCOUNTER — HOSPITAL ENCOUNTER (OUTPATIENT)
Dept: LAB | Facility: MEDICAL CENTER | Age: 89
End: 2024-06-28
Attending: INTERNAL MEDICINE
Payer: MEDICARE

## 2024-06-28 LAB
T3FREE SERPL-MCNC: 2.44 PG/ML (ref 2–4.4)
T4 FREE SERPL-MCNC: 1.19 NG/DL (ref 0.93–1.7)
TSH SERPL DL<=0.005 MIU/L-ACNC: 1.11 UIU/ML (ref 0.38–5.33)

## 2024-06-28 PROCEDURE — 36415 COLL VENOUS BLD VENIPUNCTURE: CPT

## 2024-06-28 PROCEDURE — 84443 ASSAY THYROID STIM HORMONE: CPT

## 2024-06-28 PROCEDURE — 84481 FREE ASSAY (FT-3): CPT

## 2024-06-28 PROCEDURE — 84439 ASSAY OF FREE THYROXINE: CPT

## 2024-08-02 ENCOUNTER — HOSPITAL ENCOUNTER (OUTPATIENT)
Dept: LAB | Facility: MEDICAL CENTER | Age: 89
End: 2024-08-02
Attending: INTERNAL MEDICINE
Payer: MEDICARE

## 2024-08-02 LAB
25(OH)D3 SERPL-MCNC: 68 NG/ML (ref 30–100)
T3FREE SERPL-MCNC: 2.57 PG/ML (ref 2–4.4)
T4 FREE SERPL-MCNC: 1.25 NG/DL (ref 0.93–1.7)
TSH SERPL-ACNC: 1.05 UIU/ML (ref 0.35–5.5)

## 2024-08-02 PROCEDURE — 84439 ASSAY OF FREE THYROXINE: CPT

## 2024-08-02 PROCEDURE — 84481 FREE ASSAY (FT-3): CPT

## 2024-08-02 PROCEDURE — 84443 ASSAY THYROID STIM HORMONE: CPT

## 2024-08-02 PROCEDURE — 82306 VITAMIN D 25 HYDROXY: CPT

## 2024-08-02 PROCEDURE — 36415 COLL VENOUS BLD VENIPUNCTURE: CPT

## 2024-08-30 ENCOUNTER — HOSPITAL ENCOUNTER (OUTPATIENT)
Dept: LAB | Facility: MEDICAL CENTER | Age: 89
End: 2024-08-30
Attending: FAMILY MEDICINE
Payer: MEDICARE

## 2024-08-30 LAB
ALBUMIN SERPL BCP-MCNC: 4.1 G/DL (ref 3.2–4.9)
ALBUMIN/GLOB SERPL: 1.5 G/DL
ALP SERPL-CCNC: 38 U/L (ref 30–99)
ALT SERPL-CCNC: 11 U/L (ref 2–50)
ANION GAP SERPL CALC-SCNC: 10 MMOL/L (ref 7–16)
AST SERPL-CCNC: 20 U/L (ref 12–45)
BASOPHILS # BLD AUTO: 0.3 % (ref 0–1.8)
BASOPHILS # BLD: 0.02 K/UL (ref 0–0.12)
BILIRUB SERPL-MCNC: 0.7 MG/DL (ref 0.1–1.5)
BUN SERPL-MCNC: 30 MG/DL (ref 8–22)
CALCIUM ALBUM COR SERPL-MCNC: 9.6 MG/DL (ref 8.5–10.5)
CALCIUM SERPL-MCNC: 9.7 MG/DL (ref 8.5–10.5)
CHLORIDE SERPL-SCNC: 97 MMOL/L (ref 96–112)
CHOLEST SERPL-MCNC: 194 MG/DL (ref 100–199)
CO2 SERPL-SCNC: 30 MMOL/L (ref 20–33)
CREAT SERPL-MCNC: 0.94 MG/DL (ref 0.5–1.4)
EOSINOPHIL # BLD AUTO: 0.15 K/UL (ref 0–0.51)
EOSINOPHIL NFR BLD: 2.5 % (ref 0–6.9)
ERYTHROCYTE [DISTWIDTH] IN BLOOD BY AUTOMATED COUNT: 49.1 FL (ref 35.9–50)
FASTING STATUS PATIENT QL REPORTED: NORMAL
GFR SERPLBLD CREATININE-BSD FMLA CKD-EPI: 57 ML/MIN/1.73 M 2
GLOBULIN SER CALC-MCNC: 2.7 G/DL (ref 1.9–3.5)
GLUCOSE SERPL-MCNC: 98 MG/DL (ref 65–99)
HCT VFR BLD AUTO: 46 % (ref 37–47)
HDLC SERPL-MCNC: 69 MG/DL
HGB BLD-MCNC: 15.3 G/DL (ref 12–16)
IMM GRANULOCYTES # BLD AUTO: 0.01 K/UL (ref 0–0.11)
IMM GRANULOCYTES NFR BLD AUTO: 0.2 % (ref 0–0.9)
LDLC SERPL CALC-MCNC: 105 MG/DL
LYMPHOCYTES # BLD AUTO: 2.14 K/UL (ref 1–4.8)
LYMPHOCYTES NFR BLD: 36.3 % (ref 22–41)
MCH RBC QN AUTO: 30.9 PG (ref 27–33)
MCHC RBC AUTO-ENTMCNC: 33.3 G/DL (ref 32.2–35.5)
MCV RBC AUTO: 92.9 FL (ref 81.4–97.8)
MONOCYTES # BLD AUTO: 0.65 K/UL (ref 0–0.85)
MONOCYTES NFR BLD AUTO: 11 % (ref 0–13.4)
NEUTROPHILS # BLD AUTO: 2.92 K/UL (ref 1.82–7.42)
NEUTROPHILS NFR BLD: 49.7 % (ref 44–72)
NRBC # BLD AUTO: 0 K/UL
NRBC BLD-RTO: 0 /100 WBC (ref 0–0.2)
PLATELET # BLD AUTO: 195 K/UL (ref 164–446)
PMV BLD AUTO: 10.3 FL (ref 9–12.9)
POTASSIUM SERPL-SCNC: 4 MMOL/L (ref 3.6–5.5)
PROT SERPL-MCNC: 6.8 G/DL (ref 6–8.2)
RBC # BLD AUTO: 4.95 M/UL (ref 4.2–5.4)
SODIUM SERPL-SCNC: 137 MMOL/L (ref 135–145)
TRIGL SERPL-MCNC: 102 MG/DL (ref 0–149)
WBC # BLD AUTO: 5.9 K/UL (ref 4.8–10.8)

## 2024-08-30 PROCEDURE — 85025 COMPLETE CBC W/AUTO DIFF WBC: CPT

## 2024-08-30 PROCEDURE — 80053 COMPREHEN METABOLIC PANEL: CPT

## 2024-08-30 PROCEDURE — 80061 LIPID PANEL: CPT

## 2024-08-30 PROCEDURE — 36415 COLL VENOUS BLD VENIPUNCTURE: CPT

## 2024-09-23 ENCOUNTER — OFFICE VISIT (OUTPATIENT)
Dept: URGENT CARE | Facility: CLINIC | Age: 89
End: 2024-09-23
Payer: MEDICARE

## 2024-09-23 VITALS
HEIGHT: 65 IN | RESPIRATION RATE: 14 BRPM | SYSTOLIC BLOOD PRESSURE: 106 MMHG | BODY MASS INDEX: 15.83 KG/M2 | OXYGEN SATURATION: 93 % | HEART RATE: 87 BPM | TEMPERATURE: 98.1 F | DIASTOLIC BLOOD PRESSURE: 62 MMHG | WEIGHT: 95 LBS

## 2024-09-23 DIAGNOSIS — S80.812A INFECTED ABRASION OF LEFT LOWER EXTREMITY, INITIAL ENCOUNTER: ICD-10-CM

## 2024-09-23 DIAGNOSIS — L08.9 INFECTED ABRASION OF LEFT LOWER EXTREMITY, INITIAL ENCOUNTER: ICD-10-CM

## 2024-09-23 PROCEDURE — 3074F SYST BP LT 130 MM HG: CPT

## 2024-09-23 PROCEDURE — 3078F DIAST BP <80 MM HG: CPT

## 2024-09-23 PROCEDURE — 99204 OFFICE O/P NEW MOD 45 MIN: CPT

## 2024-09-23 RX ORDER — DOXYCYCLINE HYCLATE 100 MG
100 TABLET ORAL 2 TIMES DAILY
Qty: 14 TABLET | Refills: 0 | Status: SHIPPED | OUTPATIENT
Start: 2024-09-23 | End: 2024-09-30

## 2024-09-23 ASSESSMENT — FIBROSIS 4 INDEX: FIB4 SCORE: 2.81

## 2024-09-23 ASSESSMENT — ENCOUNTER SYMPTOMS: FEVER: 0

## 2024-09-23 NOTE — PROGRESS NOTES
Subjective:     CHIEF COMPLAINT  Chief Complaint   Patient presents with    Fall     X 2 weeks ago, patient states did fall on L knee, redness, swelling       HPI  Diana Tristan is a very pleasant 89 y.o. female who presents with an abrasion to her left knee that has been present since 9/15/2024.  She is concerned that the abrasion has not healed and she has noticed a ring of redness surrounding the abrasion site.  She has also noticed that the abrasion has been leaking beige discharge.  She has not had any fevers and denies any pain to the left knee.  She has been using saline to clean the  abrasion and has kept the site covered while bathing.  She has not had any fevers and is otherwise feeling well.    REVIEW OF SYSTEMS  Review of Systems   Constitutional:  Negative for fever.       PAST MEDICAL HISTORY  Patient Active Problem List    Diagnosis Date Noted    Hyponatremia 01/17/2024    Hyperglycemia 01/17/2024    Hypokalemia 01/17/2024    Cellulitis 01/16/2024    Other specified glaucoma 01/10/2022    Leg swelling 01/05/2022    Primary hypertension 01/03/2022    Age related osteoporosis 01/03/2022    Chronic pain 01/03/2022    Acute respiratory failure with hypoxia ([facility]) 12/29/2021    Pneumonia 12/29/2021    Multiple fractures of pelvis ([facility]) 12/29/2021    Closed compression fracture of body of L1 vertebra ([facility]) 12/29/2021    Severe protein-calorie malnutrition ([facility]) 12/29/2021    Urinary retention 12/29/2021    Advance care planning 12/29/2021    Hypertensive urgency 08/14/2019    Chest pain 05/23/2018    Hyperlipidemia 05/23/2018    Chest x-ray abnormality 05/23/2018    COPD (chronic obstructive pulmonary disease) ([facility]) 05/23/2018       SURGICAL HISTORY   has a past surgical history that includes US-CYST ASPIRATION-BREAST INITIAL.    ALLERGIES  Allergies   Allergen Reactions    Amoxicillin-Pot Clavulanate Hives    Diclofenac Swelling    Sulfamethoxazole-Trimethoprim Hives    Codeine Vomiting and Nausea  "      CURRENT MEDICATIONS  Home Medications       Reviewed by Ashley Patino P.A.-C. (Physician Assistant) on 24 at 1651  Med List Status: <None>     Medication Last Dose Status   Acetaminophen (TYLENOL PO) Taking Active   amLODIPine (NORVASC) 5 MG Tab Taking Active   benazepril (LOTENSIN) 40 MG tablet Taking Active   Calcium Carbonate (CALCIUM 600 PO) Taking Active   Cholecalciferol (VITAMIN D3 PO) Taking Active   denosumab (PROLIA) 60 MG/ML Solution Prefilled Syringe injection Taking Active   hydroCHLOROthiazide 25 MG Tab Taking Active   ipratropium (ATROVENT) 0.03 % Solution Taking Active   latanoprost (XALATAN) 0.005 % Solution Taking Active   levothyroxine (SYNTHROID) 25 MCG Tab Taking Active   Plant Sterol Stanol-Pantethine (CHOLEST OFF COMPLETE PO) Taking Active                    SOCIAL HISTORY  Social History     Tobacco Use    Smoking status: Former     Current packs/day: 0.00     Types: Cigarettes     Quit date: 2015     Years since quittin.6    Smokeless tobacco: Never   Substance and Sexual Activity    Alcohol use: No    Drug use: No    Sexual activity: Not on file       FAMILY HISTORY  History reviewed. No pertinent family history.       Objective:     VITAL SIGNS: /62 (BP Location: Left arm, Patient Position: Sitting, BP Cuff Size: Large adult)   Pulse 87   Temp 36.7 °C (98.1 °F)   Resp 14   Ht 1.651 m (5' 5\")   Wt 43.1 kg (95 lb)   SpO2 93%   BMI 15.81 kg/m²     PHYSICAL EXAM  Physical Exam  Vitals reviewed.   Constitutional:       General: She is not in acute distress.     Appearance: Normal appearance. She is not ill-appearing or toxic-appearing.   HENT:      Head: Normocephalic and atraumatic.      Mouth/Throat:      Mouth: Mucous membranes are moist.   Eyes:      Conjunctiva/sclera: Conjunctivae normal.      Pupils: Pupils are equal, round, and reactive to light.   Pulmonary:      Effort: Pulmonary effort is normal. No respiratory distress.   Skin:     General: " Skin is warm and dry.      Findings: Abrasion and erythema present. No bruising.             Comments: Abrasion to left inferior patella without tenderness to palpation.  Abrasion with partial scabbing with a small amount of beige serosanguineous appearing discharge present.  Ring of erythema measuring approximately 0.5 cm surrounding perimeter of abrasion.  No foreign bodies or wound contamination present on abrasion.  Abrasion site appears extremely clean.   Neurological:      General: No focal deficit present.      Mental Status: She is alert and oriented to person, place, and time.   Psychiatric:         Mood and Affect: Mood normal.         Assessment/Plan:     1. Infected abrasion of left lower extremity, initial encounter  - doxycycline (VIBRAMYCIN) 100 MG Tab; Take 1 Tablet by mouth 2 times a day for 7 days.  Dispense: 14 Tablet; Refill: 0  -May wash area with warm soapy water and clean with saline as needed  -Monitor symptoms closely and return to clinic if symptoms are not improving in the next 3 to 5 days for a referral to wound care if needed    MDM/Comments:  I have prepared for this visit by personally reviewing the patient's prevous medical records, vitals, and labs including: most recent GFR of 57  and CMP. Patient has stable vital signs and is non-toxic appearing.  Patient has an infected abrasion to the left knee.  Patient has been initiated on doxycycline to be taken twice daily for the next 7 days.  Doxycycline does not require renal dosing and has no contraindications for an older adult.  Discussed supportive care with washing abrasion site, monitoring symptoms, and cleaning with saline as needed. Patient demonstrated understanding of treatment plan at this time and will RTC if symptoms worsen or fail to resolve.       Differential diagnosis, natural history, supportive care, and indications for immediate follow-up discussed. All questions answered. Patient agrees with the plan of  care.    Follow-up as needed if symptoms worsen or fail to improve to PCP, Urgent care or Emergency Room.    I have personally reviewed prior external notes and test results pertinent to today's visit.  I have independently reviewed and interpreted all diagnostics ordered during this urgent care acute visit.   Discussed management options (risks,benefits, and alternatives to treatment). Pt expresses understanding and the treatment plan was agreed upon. Questions were encouraged and answered to pt's satisfaction.    Please note that this dictation was created using voice recognition software. I have made a reasonable attempt to correct obvious errors, but I expect that there are errors of grammar and possibly content that I did not discover before finalizing the note.

## 2024-09-26 ENCOUNTER — HOSPITAL ENCOUNTER (OUTPATIENT)
Dept: LAB | Facility: MEDICAL CENTER | Age: 89
End: 2024-09-26
Attending: INTERNAL MEDICINE
Payer: MEDICARE

## 2024-09-26 LAB
T3FREE SERPL-MCNC: 2.36 PG/ML (ref 2–4.4)
T4 FREE SERPL-MCNC: 1.31 NG/DL (ref 0.93–1.7)
TSH SERPL-ACNC: 1.86 UIU/ML (ref 0.35–5.5)

## 2024-09-26 PROCEDURE — 84481 FREE ASSAY (FT-3): CPT

## 2024-09-26 PROCEDURE — 36415 COLL VENOUS BLD VENIPUNCTURE: CPT

## 2024-09-26 PROCEDURE — 84439 ASSAY OF FREE THYROXINE: CPT

## 2024-09-26 PROCEDURE — 84443 ASSAY THYROID STIM HORMONE: CPT

## 2025-01-03 ENCOUNTER — HOSPITAL ENCOUNTER (OUTPATIENT)
Dept: RADIOLOGY | Facility: MEDICAL CENTER | Age: OVER 89
End: 2025-01-03
Attending: FAMILY MEDICINE
Payer: MEDICARE

## 2025-01-03 ENCOUNTER — HOSPITAL ENCOUNTER (OUTPATIENT)
Dept: LAB | Facility: MEDICAL CENTER | Age: OVER 89
End: 2025-01-03
Attending: FAMILY MEDICINE
Payer: MEDICARE

## 2025-01-03 DIAGNOSIS — G89.29 CHRONIC LEFT SHOULDER PAIN: ICD-10-CM

## 2025-01-03 DIAGNOSIS — M25.512 CHRONIC LEFT SHOULDER PAIN: ICD-10-CM

## 2025-01-03 LAB
ALBUMIN SERPL BCP-MCNC: 4.4 G/DL (ref 3.2–4.9)
ALBUMIN/GLOB SERPL: 1.6 G/DL
ALP SERPL-CCNC: 42 U/L (ref 30–99)
ALT SERPL-CCNC: 15 U/L (ref 2–50)
ANION GAP SERPL CALC-SCNC: 8 MMOL/L (ref 7–16)
AST SERPL-CCNC: 27 U/L (ref 12–45)
BASOPHILS # BLD AUTO: 0.3 % (ref 0–1.8)
BASOPHILS # BLD: 0.02 K/UL (ref 0–0.12)
BILIRUB SERPL-MCNC: 0.4 MG/DL (ref 0.1–1.5)
BUN SERPL-MCNC: 31 MG/DL (ref 8–22)
CALCIUM ALBUM COR SERPL-MCNC: 9.4 MG/DL (ref 8.5–10.5)
CALCIUM SERPL-MCNC: 9.7 MG/DL (ref 8.5–10.5)
CHLORIDE SERPL-SCNC: 100 MMOL/L (ref 96–112)
CO2 SERPL-SCNC: 31 MMOL/L (ref 20–33)
CREAT SERPL-MCNC: 1.09 MG/DL (ref 0.5–1.4)
EOSINOPHIL # BLD AUTO: 0.16 K/UL (ref 0–0.51)
EOSINOPHIL NFR BLD: 2.2 % (ref 0–6.9)
ERYTHROCYTE [DISTWIDTH] IN BLOOD BY AUTOMATED COUNT: 49.7 FL (ref 35.9–50)
GFR SERPLBLD CREATININE-BSD FMLA CKD-EPI: 48 ML/MIN/1.73 M 2
GLOBULIN SER CALC-MCNC: 2.8 G/DL (ref 1.9–3.5)
GLUCOSE SERPL-MCNC: 117 MG/DL (ref 65–99)
HCT VFR BLD AUTO: 48.5 % (ref 37–47)
HGB BLD-MCNC: 15.6 G/DL (ref 12–16)
IMM GRANULOCYTES # BLD AUTO: 0.02 K/UL (ref 0–0.11)
IMM GRANULOCYTES NFR BLD AUTO: 0.3 % (ref 0–0.9)
LYMPHOCYTES # BLD AUTO: 2.26 K/UL (ref 1–4.8)
LYMPHOCYTES NFR BLD: 31.3 % (ref 22–41)
MCH RBC QN AUTO: 30.5 PG (ref 27–33)
MCHC RBC AUTO-ENTMCNC: 32.2 G/DL (ref 32.2–35.5)
MCV RBC AUTO: 94.9 FL (ref 81.4–97.8)
MONOCYTES # BLD AUTO: 0.75 K/UL (ref 0–0.85)
MONOCYTES NFR BLD AUTO: 10.4 % (ref 0–13.4)
NEUTROPHILS # BLD AUTO: 4.02 K/UL (ref 1.82–7.42)
NEUTROPHILS NFR BLD: 55.5 % (ref 44–72)
NRBC # BLD AUTO: 0 K/UL
NRBC BLD-RTO: 0 /100 WBC (ref 0–0.2)
PLATELET # BLD AUTO: 221 K/UL (ref 164–446)
PMV BLD AUTO: 11 FL (ref 9–12.9)
POTASSIUM SERPL-SCNC: 4.4 MMOL/L (ref 3.6–5.5)
PROT SERPL-MCNC: 7.2 G/DL (ref 6–8.2)
RBC # BLD AUTO: 5.11 M/UL (ref 4.2–5.4)
SODIUM SERPL-SCNC: 139 MMOL/L (ref 135–145)
T4 FREE SERPL-MCNC: 1.14 NG/DL (ref 0.93–1.7)
TSH SERPL-ACNC: 1.02 UIU/ML (ref 0.35–5.5)
WBC # BLD AUTO: 7.2 K/UL (ref 4.8–10.8)

## 2025-01-03 PROCEDURE — 85025 COMPLETE CBC W/AUTO DIFF WBC: CPT

## 2025-01-03 PROCEDURE — 73030 X-RAY EXAM OF SHOULDER: CPT | Mod: LT

## 2025-01-03 PROCEDURE — 36415 COLL VENOUS BLD VENIPUNCTURE: CPT

## 2025-01-03 PROCEDURE — 84443 ASSAY THYROID STIM HORMONE: CPT

## 2025-01-03 PROCEDURE — 84439 ASSAY OF FREE THYROXINE: CPT

## 2025-01-03 PROCEDURE — 80053 COMPREHEN METABOLIC PANEL: CPT

## 2025-01-10 ENCOUNTER — HOSPITAL ENCOUNTER (OUTPATIENT)
Dept: LAB | Facility: MEDICAL CENTER | Age: OVER 89
End: 2025-01-10
Attending: INTERNAL MEDICINE
Payer: MEDICARE

## 2025-01-10 LAB
25(OH)D3 SERPL-MCNC: 71 NG/ML (ref 30–100)
ALBUMIN SERPL BCP-MCNC: 4.3 G/DL (ref 3.2–4.9)
ALBUMIN/GLOB SERPL: 1.6 G/DL
ALP SERPL-CCNC: 42 U/L (ref 30–99)
ALT SERPL-CCNC: 20 U/L (ref 2–50)
ANION GAP SERPL CALC-SCNC: 10 MMOL/L (ref 7–16)
AST SERPL-CCNC: 26 U/L (ref 12–45)
BILIRUB SERPL-MCNC: 0.5 MG/DL (ref 0.1–1.5)
BUN SERPL-MCNC: 25 MG/DL (ref 8–22)
CALCIUM ALBUM COR SERPL-MCNC: 9.4 MG/DL (ref 8.5–10.5)
CALCIUM SERPL-MCNC: 9.6 MG/DL (ref 8.5–10.5)
CHLORIDE SERPL-SCNC: 99 MMOL/L (ref 96–112)
CO2 SERPL-SCNC: 30 MMOL/L (ref 20–33)
CREAT SERPL-MCNC: 1.01 MG/DL (ref 0.5–1.4)
GFR SERPLBLD CREATININE-BSD FMLA CKD-EPI: 52 ML/MIN/1.73 M 2
GLOBULIN SER CALC-MCNC: 2.7 G/DL (ref 1.9–3.5)
GLUCOSE SERPL-MCNC: 134 MG/DL (ref 65–99)
POTASSIUM SERPL-SCNC: 4.2 MMOL/L (ref 3.6–5.5)
PROT SERPL-MCNC: 7 G/DL (ref 6–8.2)
SODIUM SERPL-SCNC: 139 MMOL/L (ref 135–145)
T3FREE SERPL-MCNC: 2.92 PG/ML (ref 2–4.4)
T4 FREE SERPL-MCNC: 1.35 NG/DL (ref 0.93–1.7)
TSH SERPL-ACNC: 0.88 UIU/ML (ref 0.35–5.5)

## 2025-01-10 PROCEDURE — 80053 COMPREHEN METABOLIC PANEL: CPT

## 2025-01-10 PROCEDURE — 84439 ASSAY OF FREE THYROXINE: CPT

## 2025-01-10 PROCEDURE — 36415 COLL VENOUS BLD VENIPUNCTURE: CPT

## 2025-01-10 PROCEDURE — 82306 VITAMIN D 25 HYDROXY: CPT

## 2025-01-10 PROCEDURE — 84481 FREE ASSAY (FT-3): CPT

## 2025-01-10 PROCEDURE — 84443 ASSAY THYROID STIM HORMONE: CPT

## 2025-02-01 ENCOUNTER — APPOINTMENT (OUTPATIENT)
Dept: RADIOLOGY | Facility: MEDICAL CENTER | Age: OVER 89
End: 2025-02-01
Attending: INTERNAL MEDICINE
Payer: MEDICARE

## 2025-02-03 ENCOUNTER — HOSPITAL ENCOUNTER (OUTPATIENT)
Dept: RADIOLOGY | Facility: MEDICAL CENTER | Age: OVER 89
End: 2025-02-03
Attending: INTERNAL MEDICINE
Payer: MEDICARE

## 2025-02-03 DIAGNOSIS — R68.84 JAW PAIN: ICD-10-CM

## 2025-02-03 PROCEDURE — 70355 PANORAMIC X-RAY OF JAWS: CPT

## 2025-03-24 ENCOUNTER — HOSPITAL ENCOUNTER (EMERGENCY)
Facility: MEDICAL CENTER | Age: OVER 89
End: 2025-03-24
Attending: EMERGENCY MEDICINE
Payer: MEDICARE

## 2025-03-24 VITALS
RESPIRATION RATE: 16 BRPM | TEMPERATURE: 99.2 F | OXYGEN SATURATION: 91 % | DIASTOLIC BLOOD PRESSURE: 65 MMHG | WEIGHT: 100 LBS | HEART RATE: 81 BPM | SYSTOLIC BLOOD PRESSURE: 130 MMHG | HEIGHT: 65 IN | BODY MASS INDEX: 16.66 KG/M2
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
## 2025-03-24 DIAGNOSIS — S81.812A NONINFECTED SKIN TEAR OF LEFT LOWER EXTREMITY, INITIAL ENCOUNTER: ICD-10-CM

## 2025-03-24 PROCEDURE — 99284 EMERGENCY DEPT VISIT MOD MDM: CPT

## 2025-03-24 ASSESSMENT — FIBROSIS 4 INDEX: FIB4 SCORE: 2.42

## 2025-03-24 NOTE — DISCHARGE INSTRUCTIONS
Leave the dressing on for 2 days, then take it off wash daily with soap and water.  In the shower watch for any signs of infection return if infection occurs.  Unfortunate there is no way to close this to get it to heal faster this will have to take some time to heal.  Please see your primary doctor in 3 to 5 days for reevaluation

## 2025-03-24 NOTE — ED PROVIDER NOTES
ER Provider Note    Scribed for Hussain Richardson D.O. by Murray Rodriguez. 3/24/2025  3:05 PM    Primary Care Provider: Mariya Bhardwaj M.D.    CHIEF COMPLAINT  Chief Complaint   Patient presents with    T-5000 FALL     MGLF today, LLE skintear, -HS -Thinners        HPI/ROS    Diana Tristan is a 89 y.o. female who presents to the Emergency Department via EMS from her senior living facility for evaluation of a MGLF, onset 2 PM The patient states she was ambulating with her walker into her apartment when she tripped and fell due to clothes hanging off the edge of the walker. She denies feeling weak prior to the fall or syncope. She notes a skin tear to her left lower extremity. She denies headstrike or blood thinner use. She notes EMS wrapped the wound to her left shin with gauze and bleeding is controlled.       PAST MEDICAL HISTORY  Past Medical History:   Diagnosis Date    Hypertension     UTI (urinary tract infection)        SURGICAL HISTORY  Past Surgical History:   Procedure Laterality Date    US-CYST ASPIRATION-BREAST INITIAL         FAMILY HISTORY  History reviewed. No pertinent family history.    SOCIAL HISTORY   reports that she quit smoking about 10 years ago. Her smoking use included cigarettes. She has never used smokeless tobacco. She reports that she does not drink alcohol and does not use drugs.    CURRENT MEDICATIONS  Discharge Medication List as of 3/24/2025  3:25 PM        CONTINUE these medications which have NOT CHANGED    Details   amLODIPine (NORVASC) 5 MG Tab Take 5 mg by mouth every day., Historical Med      Plant Sterol Stanol-Pantethine (CHOLEST OFF COMPLETE PO) Cholest Off, Historical Med      ipratropium (ATROVENT) 0.03 % Solution INSTILL 1 SPRAY INTO EACH NOSTRIL EVERY 8 HOURS AS NEEDED FOR RUNNY NOSE, Historical Med      levothyroxine (SYNTHROID) 25 MCG Tab Take 12.5 mcg by mouth every morning on an empty stomach., Historical Med      Calcium Carbonate (CALCIUM 600 PO) Take 600 mg by  "mouth 2 times a day., Historical Med      denosumab (PROLIA) 60 MG/ML Solution Prefilled Syringe injection Inject 60 mg under the skin every 6 months. Next dose due on 2/13/2024, Historical Med      Acetaminophen (TYLENOL PO) Take 1 Tablet by mouth 2 times a day as needed (For pain). Pt is not sure the strength (OTC), Historical Med      hydroCHLOROthiazide 25 MG Tab Take 25 mg by mouth every day., Historical Med      latanoprost (XALATAN) 0.005 % Solution Administer 1 Drop into the left eye at bedtime., Historical Med      benazepril (LOTENSIN) 40 MG tablet Take 1 Tab by mouth every day., Disp-30 Tab, R-3, Normal      Cholecalciferol (VITAMIN D3 PO) Take 1 Capsule by mouth every morning., Historical Med             ALLERGIES  Amoxicillin-pot clavulanate, Diclofenac, Sulfamethoxazole-trimethoprim, and Codeine    PHYSICAL EXAM  /62   Pulse 74   Temp 37.3 °C (99.2 °F) (Temporal)   Resp 16   Ht 1.651 m (5' 5\")   Wt 45.4 kg (100 lb)   SpO2 93%   BMI 16.64 kg/m²     General: No acute distress.  HENT: Normocephalic, Mucus membranes are moist.   Chest: Lungs have even and unlabored respirations, Clear to auscultation.   Cardiovascular: Regular rate and regular rhythm, No peripheral cyanosis.  Abdomen: Non distended.  Extremities: Left shin has a skin tear. Wound edges are well-approximated. No bony tenderness. Distal neurovascular intact. Other extremities have no areas of pain or tenderness.   Neuro: Awake, Conversive, Able to relay recent events.  Psychiatric: Calm and cooperative.        INITIAL ASSESSMENT  Patient had a MGLF walking with her walker with things hanging off the edge of it causing her to fall while she opened the door to her apartment. No head injury. Only reported injury is LLE no other injuries are noted in physical exam. There is a skin tear that requires simple wound care. Stable for discharge home with wound care.     ED Observation Status? No; Patient does not meet criteria for ED " Observation.         COURSE & MEDICAL DECISION MAKING     COURSE AND PLAN  3:05 PM - Patient seen and examined at bedside. I discussed with the patient her wound is a skin tear and does not require sutures. I advised her to leave the dressing on for 2 days and then take it off to wash daily with soap and water. I advised the patient to return to the ED for any signs of infection. I discussed with the patient she should follow up with her PCP in 3-5 days for reevaluation. Patient was given the opportunity for questions. I addressed all questions or concerns at this time and they verbalize agreement to the plan of care.     ED Summary: The patient had a ground-level fall, was mechanical there was no head injury, and her only complaint is a skin tear to the left anterior lower leg, the area shows no concerns for fracture she has been ambulatory on it she is stable for discharge home with wound care.      DISPOSITION AND DISCUSSIONS  I have discussed management of the patient with the following physicians and SARAI's: None    Discussion of management with other QHP or appropriate source(s): None    Barriers to care at this time, including but not limited to: None     The patient will return for new or worsening symptoms and is stable at the time of discharge.    The patient is referred to a primary physician for blood pressure management, diabetic screening, and for all other preventative health concerns.      DISPOSITION:  Patient will be discharged home in stable condition.    FOLLOW UP:  Mariya Bhardwaj M.D.  [address] [place] [address] [id number]  [id number]  [facility] [id number]  [phone]    In 1 week        FINAL DIAGNOSIS  1. Fall, initial encounter    2. Noninfected skin tear of left lower extremity, initial encounter        Murray CUTLER), am scribing for, and in the presence of, Hussain Richardson D.O..    Electronically signed by: Murray Moran), 3/24/2025    Hussain CUTLER D.O. personally performed the services described  in this documentation, as scribed by Murray Rodriguez in my presence, and it is both accurate and complete.     The note accurately reflects work and decisions made by me.  Hussain Richardson D.O.  3/24/2025  4:43 PM

## 2025-03-24 NOTE — ED TRIAGE NOTES
"..  Chief Complaint   Patient presents with    T-5000 FALL     MGLF today, LLE skintear, -HS -Thinners        89 yo female  brought in by M[facility] from [facility] [place] [address] [place] for above complaint.    Patient was given no medications en route. Bleeding controlled, EMS wrapped leg with gauze.     BP (!) 132/2   Pulse 74   Resp 16   Ht 1.651 m (5' 5\")   Wt 45.4 kg (100 lb)   SpO2 93%   BMI 16.64 kg/m²     "

## 2025-03-24 NOTE — ED NOTES
Patient discharged per order. Wound care completed to LLE. Steri-strips applied. Oral and written discharge instructions reviewed. All belongings accounted for and taken with patient. Questions answered, and patient agrees with discharge plan. Encouraged to follow up with PCP. Patient discharged to home with daughter.

## 2025-04-09 ENCOUNTER — HOSPITAL ENCOUNTER (OUTPATIENT)
Dept: LAB | Facility: MEDICAL CENTER | Age: OVER 89
End: 2025-04-09
Attending: INTERNAL MEDICINE
Payer: MEDICARE

## 2025-04-09 LAB
25(OH)D3 SERPL-MCNC: 68 NG/ML (ref 30–100)
ALBUMIN SERPL BCP-MCNC: 4.3 G/DL (ref 3.2–4.9)
ALBUMIN/GLOB SERPL: 1.4 G/DL
ALP SERPL-CCNC: 49 U/L (ref 30–99)
ALT SERPL-CCNC: 18 U/L (ref 2–50)
ANION GAP SERPL CALC-SCNC: 13 MMOL/L (ref 7–16)
AST SERPL-CCNC: 38 U/L (ref 12–45)
BILIRUB SERPL-MCNC: 0.8 MG/DL (ref 0.1–1.5)
BUN SERPL-MCNC: 34 MG/DL (ref 8–22)
CALCIUM ALBUM COR SERPL-MCNC: 10.2 MG/DL (ref 8.5–10.5)
CALCIUM SERPL-MCNC: 10.4 MG/DL (ref 8.5–10.5)
CHLORIDE SERPL-SCNC: 96 MMOL/L (ref 96–112)
CO2 SERPL-SCNC: 28 MMOL/L (ref 20–33)
CREAT SERPL-MCNC: 1.13 MG/DL (ref 0.5–1.4)
GFR SERPLBLD CREATININE-BSD FMLA CKD-EPI: 46 ML/MIN/1.73 M 2
GLOBULIN SER CALC-MCNC: 3.1 G/DL (ref 1.9–3.5)
GLUCOSE SERPL-MCNC: 103 MG/DL (ref 65–99)
POTASSIUM SERPL-SCNC: 3.8 MMOL/L (ref 3.6–5.5)
PROT SERPL-MCNC: 7.4 G/DL (ref 6–8.2)
SODIUM SERPL-SCNC: 137 MMOL/L (ref 135–145)
T3FREE SERPL-MCNC: 2.59 PG/ML (ref 2–4.4)
T4 FREE SERPL-MCNC: 1.15 NG/DL (ref 0.93–1.7)
TSH SERPL-ACNC: 1.45 UIU/ML (ref 0.38–5.33)

## 2025-04-09 PROCEDURE — 82306 VITAMIN D 25 HYDROXY: CPT

## 2025-04-09 PROCEDURE — 84481 FREE ASSAY (FT-3): CPT

## 2025-04-09 PROCEDURE — 80053 COMPREHEN METABOLIC PANEL: CPT

## 2025-04-09 PROCEDURE — 36415 COLL VENOUS BLD VENIPUNCTURE: CPT

## 2025-04-09 PROCEDURE — 84443 ASSAY THYROID STIM HORMONE: CPT

## 2025-04-09 PROCEDURE — 84439 ASSAY OF FREE THYROXINE: CPT

## 2025-05-13 ENCOUNTER — HOSPITAL ENCOUNTER (OUTPATIENT)
Dept: LAB | Facility: MEDICAL CENTER | Age: OVER 89
End: 2025-05-13
Attending: FAMILY MEDICINE
Payer: MEDICARE

## 2025-05-13 LAB
ALBUMIN SERPL BCP-MCNC: 4.2 G/DL (ref 3.2–4.9)
ALBUMIN/GLOB SERPL: 1.3 G/DL
ALP SERPL-CCNC: 47 U/L (ref 30–99)
ALT SERPL-CCNC: 11 U/L (ref 2–50)
ANION GAP SERPL CALC-SCNC: 8 MMOL/L (ref 7–16)
AST SERPL-CCNC: 31 U/L (ref 12–45)
BASOPHILS # BLD AUTO: 0.4 % (ref 0–1.8)
BASOPHILS # BLD: 0.03 K/UL (ref 0–0.12)
BILIRUB SERPL-MCNC: 0.7 MG/DL (ref 0.1–1.5)
BUN SERPL-MCNC: 32 MG/DL (ref 8–22)
CALCIUM ALBUM COR SERPL-MCNC: 9.9 MG/DL (ref 8.5–10.5)
CALCIUM SERPL-MCNC: 10.1 MG/DL (ref 8.5–10.5)
CHLORIDE SERPL-SCNC: 98 MMOL/L (ref 96–112)
CHOLEST SERPL-MCNC: 208 MG/DL (ref 100–199)
CO2 SERPL-SCNC: 29 MMOL/L (ref 20–33)
CREAT SERPL-MCNC: 1 MG/DL (ref 0.5–1.4)
EOSINOPHIL # BLD AUTO: 0.12 K/UL (ref 0–0.51)
EOSINOPHIL NFR BLD: 1.6 % (ref 0–6.9)
ERYTHROCYTE [DISTWIDTH] IN BLOOD BY AUTOMATED COUNT: 49.2 FL (ref 35.9–50)
FASTING STATUS PATIENT QL REPORTED: NORMAL
GFR SERPLBLD CREATININE-BSD FMLA CKD-EPI: 53 ML/MIN/1.73 M 2
GLOBULIN SER CALC-MCNC: 3.2 G/DL (ref 1.9–3.5)
GLUCOSE SERPL-MCNC: 99 MG/DL (ref 65–99)
HCT VFR BLD AUTO: 48.3 % (ref 37–47)
HDLC SERPL-MCNC: 75 MG/DL
HGB BLD-MCNC: 15.9 G/DL (ref 12–16)
IMM GRANULOCYTES # BLD AUTO: 0.04 K/UL (ref 0–0.11)
IMM GRANULOCYTES NFR BLD AUTO: 0.5 % (ref 0–0.9)
LDLC SERPL CALC-MCNC: 117 MG/DL
LYMPHOCYTES # BLD AUTO: 2.56 K/UL (ref 1–4.8)
LYMPHOCYTES NFR BLD: 33.7 % (ref 22–41)
MCH RBC QN AUTO: 30.2 PG (ref 27–33)
MCHC RBC AUTO-ENTMCNC: 32.9 G/DL (ref 32.2–35.5)
MCV RBC AUTO: 91.8 FL (ref 81.4–97.8)
MONOCYTES # BLD AUTO: 0.91 K/UL (ref 0–0.85)
MONOCYTES NFR BLD AUTO: 12 % (ref 0–13.4)
NEUTROPHILS # BLD AUTO: 3.94 K/UL (ref 1.82–7.42)
NEUTROPHILS NFR BLD: 51.8 % (ref 44–72)
NRBC # BLD AUTO: 0 K/UL
NRBC BLD-RTO: 0 /100 WBC (ref 0–0.2)
PLATELET # BLD AUTO: 255 K/UL (ref 164–446)
PMV BLD AUTO: 10.2 FL (ref 9–12.9)
POTASSIUM SERPL-SCNC: 4.1 MMOL/L (ref 3.6–5.5)
PROT SERPL-MCNC: 7.4 G/DL (ref 6–8.2)
RBC # BLD AUTO: 5.26 M/UL (ref 4.2–5.4)
SODIUM SERPL-SCNC: 135 MMOL/L (ref 135–145)
TRIGL SERPL-MCNC: 81 MG/DL (ref 0–149)
TSH SERPL DL<=0.005 MIU/L-ACNC: 1.5 UIU/ML (ref 0.38–5.33)
WBC # BLD AUTO: 7.6 K/UL (ref 4.8–10.8)

## 2025-05-13 PROCEDURE — 85025 COMPLETE CBC W/AUTO DIFF WBC: CPT

## 2025-05-13 PROCEDURE — 80053 COMPREHEN METABOLIC PANEL: CPT

## 2025-05-13 PROCEDURE — 36415 COLL VENOUS BLD VENIPUNCTURE: CPT

## 2025-05-13 PROCEDURE — 80061 LIPID PANEL: CPT

## 2025-05-13 PROCEDURE — 84443 ASSAY THYROID STIM HORMONE: CPT

## 2025-05-24 ENCOUNTER — APPOINTMENT (OUTPATIENT)
Dept: RADIOLOGY | Facility: MEDICAL CENTER | Age: OVER 89
End: 2025-05-24
Attending: EMERGENCY MEDICINE
Payer: MEDICARE

## 2025-05-24 ENCOUNTER — HOSPITAL ENCOUNTER (EMERGENCY)
Facility: MEDICAL CENTER | Age: OVER 89
End: 2025-05-24
Attending: EMERGENCY MEDICINE
Payer: MEDICARE

## 2025-05-24 VITALS
HEART RATE: 86 BPM | RESPIRATION RATE: 17 BRPM | SYSTOLIC BLOOD PRESSURE: 186 MMHG | HEIGHT: 65 IN | OXYGEN SATURATION: 90 % | TEMPERATURE: 98.1 F | DIASTOLIC BLOOD PRESSURE: 94 MMHG | WEIGHT: 97 LBS | BODY MASS INDEX: 16.16 KG/M2

## 2025-05-24 DIAGNOSIS — S16.1XXA STRAIN OF NECK MUSCLE, INITIAL ENCOUNTER: ICD-10-CM

## 2025-05-24 DIAGNOSIS — S09.90XA CLOSED HEAD INJURY, INITIAL ENCOUNTER: ICD-10-CM
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
## 2025-05-24 DIAGNOSIS — S81.811A SKIN TEAR OF RIGHT LOWER LEG WITHOUT COMPLICATION, INITIAL ENCOUNTER: ICD-10-CM

## 2025-05-24 DIAGNOSIS — S00.01XA ABRASION OF SCALP, INITIAL ENCOUNTER: ICD-10-CM

## 2025-05-24 PROCEDURE — 304217 HCHG IRRIGATION SYSTEM

## 2025-05-24 PROCEDURE — 73060 X-RAY EXAM OF HUMERUS: CPT | Mod: LT

## 2025-05-24 PROCEDURE — 73590 X-RAY EXAM OF LOWER LEG: CPT | Mod: RT

## 2025-05-24 PROCEDURE — 70450 CT HEAD/BRAIN W/O DYE: CPT

## 2025-05-24 PROCEDURE — 72125 CT NECK SPINE W/O DYE: CPT

## 2025-05-24 PROCEDURE — 303353 HCHG DERMABOND SKIN ADHESIVE

## 2025-05-24 PROCEDURE — 99284 EMERGENCY DEPT VISIT MOD MDM: CPT

## 2025-05-24 PROCEDURE — 304999 HCHG REPAIR-SIMPLE/INTERMED LEVEL 1

## 2025-05-24 ASSESSMENT — FIBROSIS 4 INDEX: FIB4 SCORE: 3.37

## 2025-05-25 NOTE — ED NOTES
Patient provided discharge instructions. Patient verbalized understanding. Patient leaving ER in stable condition in wheelchair assist for comfort and safety. Patient ambulatory with steady gait with walker. Wristband and IV removed. Family taking pt home at discharge

## 2025-05-25 NOTE — ED PROVIDER NOTES
ER Provider Note    Scribed for Poonam Orozco M.d. by Vianey Humphreys. 5/24/2025  5:34 PM    Primary Care Provider: Mariya Bhardwaj M.D.    CHIEF COMPLAINT   Chief Complaint   Patient presents with    GLF     Pt BIBA from home for GLF, pt was sitting on bed putting her shoes on when she fell forward striking the left side of her head on the dresser. (+) HS, (-) LOC, (-) thinners. C/o tenderness to head and left shoulder pain. A&Ox4, GCS 15. Skin tear to right lower leg as well.      EXTERNAL RECORDS REVIEWED  The patient was seen by Endocrinology last month for her osteoporosis and hyperthyroidism. She was here in the ER at the end of March for a ground level fall.     HPI/ROS  LIMITATION TO HISTORY   Select: : None  OUTSIDE HISTORIAN(S):  EMS  at bedside to confirm sequence of events and collateral information provided. See HPI below.     Diana Tristan is a 89 y.o. female who presents to the ED via EMS from home for evaluation after a ground level fall onset earlier today. EMS describes that the patient was sitting on her bed putting her shoes on when she fell forward and struck the left side of her head on a dresser. The patient does have pain to the left side of her head, but denies any headache. The patient denies having any lightheadedness, dizziness, numbness/tingling to her arms or legs or unilateral weakness before or after the fall. She also denies any nausea, vomiting, double vision or any other vision changes since the fall. The patient also notes that she has a skin tear to her right leg.  No chest pains or shortness of breath.  Patient says she was feeling completely normal prior to falling forward while trying to put on her shoes.  Per EMS, the patient has been alert and oriented x4 with them. The patient denies any neck pain, chest pain, abdominal pain, hip pain, back pain.  The patient is not on any blood thinning medication. She denies any recent illness.     PAST MEDICAL  "HISTORY  Past Medical History[1]    SURGICAL HISTORY  Past Surgical History[2]    FAMILY HISTORY  History reviewed. No pertinent family history.    SOCIAL HISTORY   reports that she quit smoking about 10 years ago. Her smoking use included cigarettes. She has never used smokeless tobacco. She reports that she does not drink alcohol and does not use drugs.    CURRENT MEDICATIONS  Previous Medications    ACETAMINOPHEN (TYLENOL PO)    Take 1 Tablet by mouth 2 times a day as needed (For pain). Pt is not sure the strength (OTC)    AMLODIPINE (NORVASC) 5 MG TAB    Take 5 mg by mouth every day.    BENAZEPRIL (LOTENSIN) 40 MG TABLET    Take 1 Tab by mouth every day.    CALCIUM CARBONATE (CALCIUM 600 PO)    Take 600 mg by mouth 2 times a day.    CHOLECALCIFEROL (VITAMIN D3 PO)    Take 1 Capsule by mouth every morning.    DENOSUMAB (PROLIA) 60 MG/ML SOLUTION PREFILLED SYRINGE INJECTION    Inject 60 mg under the skin every 6 months. Next dose due on 2/13/2024    HYDROCHLOROTHIAZIDE 25 MG TAB    Take 25 mg by mouth every day.    IPRATROPIUM (ATROVENT) 0.03 % SOLUTION    INSTILL 1 SPRAY INTO EACH NOSTRIL EVERY 8 HOURS AS NEEDED FOR RUNNY NOSE    LATANOPROST (XALATAN) 0.005 % SOLUTION    Administer 1 Drop into the left eye at bedtime.    LEVOTHYROXINE (SYNTHROID) 25 MCG TAB    Take 12.5 mcg by mouth every morning on an empty stomach.    PLANT STEROL STANOL-PANTETHINE (CHOLEST OFF COMPLETE PO)    Cholest Off     ALLERGIES  Amoxicillin-pot clavulanate, Diclofenac, Sulfamethoxazole-trimethoprim, and Codeine      PHYSICAL EXAM  BP (!) 146/68   Pulse 81   Resp 16   Ht 1.651 m (5' 5\")   Wt 44 kg (97 lb)   SpO2 91%   BMI 16.14 kg/m²     Constitutional: Well developed, well nourished; Mild distress; Non-toxic appearance.   HENT: Normocephalic, 0.5 cm abrasion to the left upper parietal scalp. 2 mm section, which barely opens; Bilateral external ears normal; Oropharynx with moist mucous membranes; No erythema or exudates in the " posterior oropharynx.   Eyes: PERRL, EOMI, Conjunctiva normal. No discharge. No raccoons.   Neck:  Supple, nontender midline; No stridor; No nuchal rigidity.   Lymphatic: No cervical lymphadenopathy noted.   Cardiovascular: Regular rate and rhythm without murmurs, rubs, or gallop.   Thorax & Lungs: No respiratory distress, breath sounds clear to auscultation bilaterally without wheezing, rales or rhonchi. Nontender chest wall. No crepitus or subcutaneous air. No rattles.   Abdomen: Soft, nontender, bowel sounds normal. No obvious masses; No pulsatile masses; no rebound, guarding, or peritoneal signs.   Skin: Good color; warm and dry without rash or petechia.  Back: Nontender t or l spine, No CVA tenderness.   Extremities: 4 cm skin tear to the posterolateral aspect of her right lower leg.  No bony tenderness to the right lower extremity, foot or ankle.  There was some mild tenderness to the left humerus and bicep muscle. Distal radial, dorsalis pedis, posterior tibial pulses are equal bilaterally; No edema; Nontender calves or saphenous, No cyanosis, No clubbing.   Musculoskeletal: Good and full range of motion in all major joints, including her hips. No tenderness to palpation or major deformities noted.   Neurologic: Alert & oriented x 4, clear speech.      DIAGNOSTIC STUDIES    RADIOLOGY/PROCEDURES   The attending emergency physician has independently interpreted the diagnostic imaging associated with this visit and am waiting the final reading from the radiologist.     My preliminary interpretation is a follows: ER MD is reviewed the patient's leg x-ray.  No obvious fractures.    Radiologist interpretation:  DX-HUMERUS 2+ LEFT   Final Result            1. No acute osseous abnormality.      DX-TIBIA AND FIBULA RIGHT   Final Result      No fracture or dislocation.      CT-CSPINE WITHOUT PLUS RECONS   Final Result      No acute fracture or traumatic listhesis in the cervical spine.      CT-HEAD W/O   Final Result       No CT evidence of acute infarct, hemorrhage or mass.                     Laceration Repair Procedure Note    Indication: Lacerations    Procedure: The patient was placed in the appropriate position and anesthesia around the laceration was not necessary at this time. The wound was not contaminated. The 0.5 cm abrasion to the left upper parietal scalp with a 2 mm section, which barely opens, was closed with Dermabond. The patient's 4 cm skin tear to the lower right leg was approximated very nicely with Dermabond. The wound area was then dressed.    Total repaired wound length: 4.5 cm.     The patient tolerated the procedure well.    Complications: None       COURSE & MEDICAL DECISION MAKING     ASSESSMENT, COURSE AND PLAN  Care Narrative: Patient presents to the ER as a code TBI by EMS after the patient hit her head on a dresser.  She was sitting on the edge of the bed leaning forward to put on her shoes.  She toppled forward, striking her head on the dresser.  No LOC.  She is not on any blood thinners.  She complains of pain to the left side of her head in the area of the small 0.5 cm partial-thickness abrasion/laceration.  No headache.  No vision changes.  No vertigo.  No neck pain.  No complaints of numbness, tingling or weakness of extremities.  No rib pain.  No abdominal pain.  Denies back pain.  Patient has no tenderness to the C-spine, T-spine or L-spine.  No tenderness to the chest wall or abdomen.  No pain with range of motion of the hips.  She has a skin tear to the posterior lateral aspect of the right lower leg near the ankle.  The skin approximated nicely with Dermabond.  A little dollop of Dermabond was placed on the scalp laceration as well as there was a 2 mm section which did open slightly when manipulated.  Patient preferred Dermabond versus 1 staple because she would have to return to hospital or medical office to get the staple removed.  I think this is very reasonable.  CT brain is negative  for any acute traumatic injuries.  CT C-spine is negative for any acute fracture.  X-ray of the tib-fib was performed and there is no bony abnormality.  Patient has no bony tenderness in the area of the skin tear.  She is ambulatory with a walker without difficulty.  She also complained of some pain in her left bicep area.  X-ray of the humerus is negative for any fracture.  She had full range of motion to her shoulder and her elbow and no concern for shoulder or elbow fracture or dislocation.  She is neurovascular intact.  She is well-appearing.  She is not in any distress.  She is anxious to go home.  She was getting ready to go to dinner before she fell.  She is rather disgusted that she is here in the ER and not at dinner.  At this time patient is safe and stable for outpatient management discharge home.  She has been given strict return precautions and discharge instructions and she understands treatment plan and follow-up.    5:34 PM - Patient seen and examined at bedside. This is a 89 year old woman who presents to the emergency department via EMS from home for evaluation after a ground level fall. The patient was sitting on her bed, putting her shoes on when she fell forward and struck the left side of her head on a dresser. She also has a skin tear to the right leg. Discussed plan of care, including performing lab work. Patient agrees to the plan of care. Ordered for CT-Cspine w/o, CT-Head w/o, and DX-Tibia and Fibula (Right) to evaluate her symptoms.      6:40 PM - The patient was reevaluated at bedside. Informed the patient that her scans are reassuring with no signs of fracture. Patient's head will be cleaned up to further evaluate. Patient is currently has the nasal canula on, but oxygen is not on, so it was removed. She notes that she does not wear oxygen at home. The patient now complains of pain to her left upper extremity, but does not have any other new pain.     7:04 PM - Ordered DX-Humerus  (Left).     8:28 PM - The patient was reevaluated at bedside. Informed the patient that the scan of her left arm is reassuring with no signs of fracture. Laceration repair was performed by me. See note above. Discussed discharge instructions and return precautions with the patient and they were cleared for discharge. Patient was given the opportunity to ask any further questions. She is comfortable with discharge at this time.       ADDITIONAL PROBLEM LIST  Problem #1: Closed head injury  Problem #2: Scalp laceration  Problem #3: Skin tear right ankle  Problem #4: Left upper arm pain    DISPOSITION AND DISCUSSIONS  I have discussed management of the patient with the following physicians and SARAI's:  None.    Discussion of management with other [facility] or appropriate source(s): None     Escalation of care considered, and ultimately not performed: Laboratory analysis.  Patient does not need any blood work.  She was feeling just fine prior to bending forward to put on her shoe.  She toppled over while bending forward to put on her shoe and fell forward, hitting her head on the dresser.  No need for blood work or any medical workup at this time.    Barriers to care at this time, including but not limited to: None known.     Decision tools and prescription drugs considered including, but not limited to: Antibiotics patient is not a diabetic.  Wounds are not contaminated.  No need for antibiotics..    The patient will return for new or worsening symptoms and is stable at the time of discharge.    DISPOSITION:  Patient will be discharged home in stable condition.    FOLLOW UP:  Stoney NICHOLS M.D.  [address] [place] [address]  [phone]          FINAL DIAGNOSIS  1. Fall, initial encounter Acute   2. Closed head injury, initial encounter Acute   3. Strain of neck muscle, initial encounter Acute   4. Skin tear of right lower leg without complication, initial encounter Acute   5. Abrasion of scalp, initial  encounter Acute   6. Laceration Repair     Vianey CUTLER (Scribe), am scribing for, and in the presence of, Poonam Orozco M.D..    Electronically signed by: Vianey Humphreys (Scribe), 5/24/2025    Poonam CUTLER M.D. personally performed the services described in this documentation, as scribed by Vianey Humphreys in my presence, and it is both accurate and complete.     This dictation has been created using voice recognition software. The accuracy of the dictation is limited by the abilities of the software. I expect there may be some errors of grammar and possibly content. I made every attempt to manually correct the errors within my dictation. However, errors related to voice recognition software may still exist and should be interpreted within the appropriate context.      The note accurately reflects work and decisions made by me.  Poonam Orozco M.D.  5/25/2025  2:37 AM         [1]   Past Medical History:  Diagnosis Date    Hypertension     UTI (urinary tract infection)    [2]   Past Surgical History:  Procedure Laterality Date    US-CYST ASPIRATION-BREAST INITIAL

## 2025-05-25 NOTE — DISCHARGE INSTRUCTIONS
Return to the ER for any headache, dizziness or vertigo, feeling of being off balance with walking, nausea, vomiting, visual changes, lethargy, behavioral changes, neck pain, pain radiating down arms or legs, numbness/tingling/weakness of arms or legs, signs of redness around the wounds, drainage of pus from the wounds, or for any concerns.    Follow-up with your primary care physician early this coming week.  Please call for appointment.

## 2025-05-25 NOTE — ED TRIAGE NOTES
Chief Complaint   Patient presents with    GLF     Pt BIBA from home for GLF, pt was sitting on bed putting her shoes on when she fell forward striking the left side of her head on the dresser. (+) HS, (-) LOC, (-) thinners. C/o tenderness to head and left shoulder pain. A&Ox4, GCS 15. Skin tear to right lower leg as well.

## 2025-06-09 ENCOUNTER — HOME HEALTH ADMISSION (OUTPATIENT)
Dept: HOME HEALTH SERVICES | Facility: HOME HEALTHCARE | Age: OVER 89
End: 2025-06-09
Payer: MEDICARE

## 2025-06-11 ENCOUNTER — TELEPHONE (OUTPATIENT)
Dept: HOME HEALTH SERVICES | Facility: HOME HEALTHCARE | Age: OVER 89
End: 2025-06-11
Payer: MEDICARE

## 2025-06-11 NOTE — TELEPHONE ENCOUNTER
Patient is returning call and is wondering if [facility] would have anything available on 8/31/2022 late morning early afternoon  [phone]    Spoke to patient to schedule SOC for 6/12, 12:30-1:30pm. Later start of care requested for 6/12 per request.

## 2025-06-12 ENCOUNTER — HOME CARE VISIT (OUTPATIENT)
Dept: HOME HEALTH SERVICES | Facility: HOME HEALTHCARE | Age: OVER 89
End: 2025-06-12
Payer: MEDICARE

## 2025-06-12 ENCOUNTER — DOCUMENTATION (OUTPATIENT)
Dept: MEDICAL GROUP | Facility: PHYSICIAN GROUP | Age: OVER 89
End: 2025-06-12
Payer: MEDICARE

## 2025-06-12 VITALS
DIASTOLIC BLOOD PRESSURE: 62 MMHG | BODY MASS INDEX: 16.16 KG/M2 | WEIGHT: 97 LBS | HEART RATE: 72 BPM | TEMPERATURE: 97.6 F | OXYGEN SATURATION: 94 % | HEIGHT: 65 IN | SYSTOLIC BLOOD PRESSURE: 120 MMHG | RESPIRATION RATE: 16 BRPM

## 2025-06-12 PROCEDURE — 665999 HH PPS REVENUE DEBIT

## 2025-06-12 PROCEDURE — 665998 HH PPS REVENUE CREDIT

## 2025-06-12 PROCEDURE — 665001 SOC-HOME HEALTH

## 2025-06-12 PROCEDURE — G0299 HHS/HOSPICE OF RN EA 15 MIN: HCPCS

## 2025-06-12 PROCEDURE — 665005 NO-PAY RAP - HOME HEALTH

## 2025-06-12 ASSESSMENT — ENCOUNTER SYMPTOMS
PAIN LOCATION: RIGHT LEG
PAIN SEVERITY GOAL: 0/10
PAIN: 1
NAUSEA: DENIES
PAIN LOCATION - PAIN QUALITY: SHARP
BOWEL PATTERN NORMAL: 1
PAIN LOCATION - PAIN SEVERITY: 4/10
PAIN LOCATION - RELIEVING FACTORS: ELEVATION
VOMITING: DENIES
LOWEST PAIN SEVERITY IN PAST 24 HOURS: 2/10
LAST BOWEL MOVEMENT: 67368
SUBJECTIVE PAIN PROGRESSION: UNCHANGED
PAIN LOCATION - PAIN DURATION: ACUTE
STOOL FREQUENCY: DAILY
HIGHEST PAIN SEVERITY IN PAST 24 HOURS: 8/10
PAIN LOCATION - PAIN FREQUENCY: INTERMITTENT

## 2025-06-12 ASSESSMENT — FIBROSIS 4 INDEX: FIB4 SCORE: 3.37

## 2025-06-12 NOTE — PROGRESS NOTES
Medication chart review for [facility] services    Received referral from [facility].   Medications reviewed  compared with discharge summary if available.  Discharge summary date, if applicable:   5/4    Current medication list per [facility]     Medication list one, patient is currently taking    Current Outpatient Medications:     ibuprofen, 200 mg, Oral, Q8HRS PRN    amLODIPine, 5 mg, Oral, DAILY    Plant Sterol Stanol-Pantethine (CHOLEST OFF COMPLETE PO), Cholest Off    ipratropium, INSTILL 1 SPRAY INTO EACH NOSTRIL EVERY 8 HOURS AS NEEDED FOR RUNNY NOSE    levothyroxine, 12.5 mcg, Oral, AM ES    Calcium Carbonate (CALCIUM 600 PO), 600 mg, Oral, BID    Acetaminophen (TYLENOL PO), 1 Tablet, Oral, BID PRN    hydroCHLOROthiazide, 25 mg, Oral, DAILY    latanoprost, 1 Drop, Left Eye, QHS    benazepril, 40 mg, Oral, DAILY    Cholecalciferol (VITAMIN D3 PO), 1 Capsule, Oral, QAM      Medication list two, drugs that the patient has been prescribed or recommended to take by their healthcare provider on discharge summary  N/a    Allergies[1]    Labs     Lab Results   Component Value Date/Time    SODIUM 135 05/13/2025 08:22 AM    POTASSIUM 4.1 05/13/2025 08:22 AM    CHLORIDE 98 05/13/2025 08:22 AM    CO2 29 05/13/2025 08:22 AM    GLUCOSE 99 05/13/2025 08:22 AM    BUN 32 (H) 05/13/2025 08:22 AM    CREATININE 1.00 05/13/2025 08:22 AM     Lab Results   Component Value Date/Time    ALKPHOSPHAT 47 05/13/2025 08:22 AM    ASTSGOT 31 05/13/2025 08:22 AM    ALTSGPT 11 05/13/2025 08:22 AM    TBILIRUBIN 0.7 05/13/2025 08:22 AM    INR 1.08 04/26/2018 01:20 PM    ALBUMIN 4.2 05/13/2025 08:22 AM        Assessment for clinically significant drug interactions, drug omissions/additions, duplicative therapies.            CC   Mariya Bhardwaj M.D.  [address] [facility] [id number]  [place] [address]  Fax: [phone]    [facility]  Phone [phone] fax [phone]    This note was created using voice  recognition software (Dragon). The accuracy of the dictation is limited by the abilities of the software. I have reviewed the note prior to signing, however some errors in grammar and context are still possible. If you have any questions related to this note please do not hesitate to contact our office.       No follow-ups on file.       [1]   Allergies  Allergen Reactions    Amoxicillin-Pot Clavulanate Hives    Diclofenac Swelling    Sulfamethoxazole-Trimethoprim Hives    Codeine Vomiting and Nausea

## 2025-06-13 PROCEDURE — 665999 HH PPS REVENUE DEBIT

## 2025-06-13 PROCEDURE — 665998 HH PPS REVENUE CREDIT

## 2025-06-14 ENCOUNTER — TELEPHONE (OUTPATIENT)
Dept: HOME HEALTH SERVICES | Facility: HOME HEALTHCARE | Age: OVER 89
End: 2025-06-14
Payer: MEDICARE

## 2025-06-14 PROCEDURE — 665999 HH PPS REVENUE DEBIT

## 2025-06-14 PROCEDURE — 665998 HH PPS REVENUE CREDIT

## 2025-06-14 NOTE — TELEPHONE ENCOUNTER
Patient called to inquire about wound care appointments. Asks that if the nurse calls on Sunday to discuss Monday appointment to call after 2pm to ensure she is home from [facility].

## 2025-06-15 PROCEDURE — 665999 HH PPS REVENUE DEBIT

## 2025-06-15 PROCEDURE — 665998 HH PPS REVENUE CREDIT

## 2025-06-16 ENCOUNTER — HOME CARE VISIT (OUTPATIENT)
Dept: HOME HEALTH SERVICES | Facility: HOME HEALTHCARE | Age: OVER 89
End: 2025-06-16
Payer: MEDICARE

## 2025-06-16 VITALS
TEMPERATURE: 99.1 F | RESPIRATION RATE: 16 BRPM | SYSTOLIC BLOOD PRESSURE: 108 MMHG | HEART RATE: 83 BPM | OXYGEN SATURATION: 91 % | DIASTOLIC BLOOD PRESSURE: 62 MMHG

## 2025-06-16 PROCEDURE — 665998 HH PPS REVENUE CREDIT

## 2025-06-16 PROCEDURE — G0299 HHS/HOSPICE OF RN EA 15 MIN: HCPCS

## 2025-06-16 PROCEDURE — 665999 HH PPS REVENUE DEBIT

## 2025-06-16 ASSESSMENT — ENCOUNTER SYMPTOMS
DRY SKIN: 1
MUSCLE WEAKNESS: 1
STOOL FREQUENCY: DAILY
PAIN: 1
BOWEL PATTERN NORMAL: 1
DEBILITATING PAIN: 1
LAST BOWEL MOVEMENT: 67372
FATIGUES EASILY: 1
PAIN LOCATION - PAIN SEVERITY: 0/10
VOMITING: DENIES
NAUSEA: DENIES
PAIN LOCATION: RIGHT LEG
LOWEST PAIN SEVERITY IN PAST 24 HOURS: 0/10
HIGHEST PAIN SEVERITY IN PAST 24 HOURS: 4/10
PAIN SEVERITY GOAL: 2/10
SUBJECTIVE PAIN PROGRESSION: UNCHANGED

## 2025-06-16 ASSESSMENT — PATIENT HEALTH QUESTIONNAIRE - PHQ9: CLINICAL INTERPRETATION OF PHQ2 SCORE: 0

## 2025-06-17 PROCEDURE — 665998 HH PPS REVENUE CREDIT

## 2025-06-17 PROCEDURE — 665999 HH PPS REVENUE DEBIT

## 2025-06-18 PROCEDURE — 665998 HH PPS REVENUE CREDIT

## 2025-06-18 PROCEDURE — 665999 HH PPS REVENUE DEBIT

## 2025-06-18 ASSESSMENT — ACTIVITIES OF DAILY LIVING (ADL): OASIS_M1830: 03

## 2025-06-19 ENCOUNTER — HOME CARE VISIT (OUTPATIENT)
Dept: HOME HEALTH SERVICES | Facility: HOME HEALTHCARE | Age: OVER 89
End: 2025-06-19
Payer: MEDICARE

## 2025-06-19 VITALS
DIASTOLIC BLOOD PRESSURE: 64 MMHG | HEART RATE: 79 BPM | SYSTOLIC BLOOD PRESSURE: 112 MMHG | OXYGEN SATURATION: 93 % | RESPIRATION RATE: 16 BRPM | TEMPERATURE: 98.4 F

## 2025-06-19 PROCEDURE — 665998 HH PPS REVENUE CREDIT

## 2025-06-19 PROCEDURE — 665999 HH PPS REVENUE DEBIT

## 2025-06-19 PROCEDURE — G0299 HHS/HOSPICE OF RN EA 15 MIN: HCPCS

## 2025-06-19 ASSESSMENT — ENCOUNTER SYMPTOMS
DENIES PAIN: 1
DRY SKIN: 1
LAST BOWEL MOVEMENT: 67375
BOWEL PATTERN NORMAL: 1
VOMITING: DENIES
NAUSEA: DENIES
MUSCLE WEAKNESS: 1
STOOL FREQUENCY: DAILY

## 2025-06-19 ASSESSMENT — ACTIVITIES OF DAILY LIVING (ADL)
AMBULATION ASSISTANCE: STAND BY ASSIST
CURRENT_FUNCTION: STAND BY ASSIST

## 2025-06-19 ASSESSMENT — PATIENT HEALTH QUESTIONNAIRE - PHQ9: CLINICAL INTERPRETATION OF PHQ2 SCORE: 0

## 2025-06-20 PROCEDURE — 665999 HH PPS REVENUE DEBIT

## 2025-06-20 PROCEDURE — 665998 HH PPS REVENUE CREDIT

## 2025-06-21 PROCEDURE — 665998 HH PPS REVENUE CREDIT

## 2025-06-21 PROCEDURE — 665999 HH PPS REVENUE DEBIT

## 2025-06-22 PROCEDURE — 665999 HH PPS REVENUE DEBIT

## 2025-06-22 PROCEDURE — 665998 HH PPS REVENUE CREDIT

## 2025-06-23 ENCOUNTER — HOME CARE VISIT (OUTPATIENT)
Dept: HOME HEALTH SERVICES | Facility: HOME HEALTHCARE | Age: OVER 89
End: 2025-06-23
Payer: MEDICARE

## 2025-06-23 VITALS
OXYGEN SATURATION: 95 % | DIASTOLIC BLOOD PRESSURE: 58 MMHG | SYSTOLIC BLOOD PRESSURE: 100 MMHG | HEART RATE: 70 BPM | TEMPERATURE: 98.4 F | RESPIRATION RATE: 16 BRPM

## 2025-06-23 PROCEDURE — 665999 HH PPS REVENUE DEBIT

## 2025-06-23 PROCEDURE — 665998 HH PPS REVENUE CREDIT

## 2025-06-23 PROCEDURE — G0299 HHS/HOSPICE OF RN EA 15 MIN: HCPCS

## 2025-06-23 ASSESSMENT — ENCOUNTER SYMPTOMS
PAIN LOCATION: RIGHT
NAUSEA: DENIES
PAIN LOCATION - EXACERBATING FACTORS: DRESSING CHANGES
PAIN LOCATION - RELIEVING FACTORS: LEAVING IT ALONE
STOOL FREQUENCY: DAILY
PAIN SEVERITY GOAL: 2/10
SUBJECTIVE PAIN PROGRESSION: UNCHANGED
BOWEL PATTERN NORMAL: 1
HIGHEST PAIN SEVERITY IN PAST 24 HOURS: 4/10
DRY SKIN: 1
PAIN: 1
PAIN LOCATION - PAIN DURATION: INTERMITTENT
PAIN LOCATION - PAIN SEVERITY: 4/10
PAIN LOCATION - PAIN FREQUENCY: CONSTANT
LAST BOWEL MOVEMENT: 67379
LOWEST PAIN SEVERITY IN PAST 24 HOURS: 0/10
VOMITING: DENIES

## 2025-06-23 ASSESSMENT — ACTIVITIES OF DAILY LIVING (ADL)
CURRENT_FUNCTION: STAND BY ASSIST
AMBULATION ASSISTANCE: STAND BY ASSIST

## 2025-06-23 ASSESSMENT — PATIENT HEALTH QUESTIONNAIRE - PHQ9: CLINICAL INTERPRETATION OF PHQ2 SCORE: 0

## 2025-06-24 PROCEDURE — 665999 HH PPS REVENUE DEBIT

## 2025-06-24 PROCEDURE — 665998 HH PPS REVENUE CREDIT

## 2025-06-25 ENCOUNTER — HOME CARE VISIT (OUTPATIENT)
Dept: HOME HEALTH SERVICES | Facility: HOME HEALTHCARE | Age: OVER 89
End: 2025-06-25
Payer: MEDICARE

## 2025-06-25 PROCEDURE — 665999 HH PPS REVENUE DEBIT

## 2025-06-25 PROCEDURE — 665998 HH PPS REVENUE CREDIT

## 2025-06-25 NOTE — CASE COMMUNICATION
Diana was discussed in IDT rounds today. Diana is receiving skilled nursing care for wound care to her right leg following an injury with a fall using her walker.

## 2025-06-26 ENCOUNTER — HOME CARE VISIT (OUTPATIENT)
Dept: HOME HEALTH SERVICES | Facility: HOME HEALTHCARE | Age: OVER 89
End: 2025-06-26
Payer: MEDICARE

## 2025-06-26 VITALS
HEART RATE: 87 BPM | TEMPERATURE: 98.9 F | DIASTOLIC BLOOD PRESSURE: 66 MMHG | OXYGEN SATURATION: 93 % | SYSTOLIC BLOOD PRESSURE: 112 MMHG | RESPIRATION RATE: 16 BRPM

## 2025-06-26 PROCEDURE — G0299 HHS/HOSPICE OF RN EA 15 MIN: HCPCS

## 2025-06-26 PROCEDURE — 665998 HH PPS REVENUE CREDIT

## 2025-06-26 PROCEDURE — 665999 HH PPS REVENUE DEBIT

## 2025-06-26 ASSESSMENT — ENCOUNTER SYMPTOMS
PAIN: 1
PAIN LOCATION - PAIN FREQUENCY: CONSTANT
PAIN LOCATION - PAIN SEVERITY: 3/10
PAIN SEVERITY GOAL: 1/10
LOWEST PAIN SEVERITY IN PAST 24 HOURS: 0/10
PAIN LOCATION - RELIEVING FACTORS: REST
VOMITING: DENIES
HIGHEST PAIN SEVERITY IN PAST 24 HOURS: 5/10
MUSCLE WEAKNESS: 1
STOOL FREQUENCY: DAILY
BOWEL PATTERN NORMAL: 1
DRY SKIN: 1
SUBJECTIVE PAIN PROGRESSION: UNCHANGED
FATIGUES EASILY: 1
NAUSEA: DENIES
PAIN LOCATION - PAIN DURATION: CONTINUOUS
PAIN LOCATION: RIGHT LEG WOUND
PAIN LOCATION - EXACERBATING FACTORS: WALKING
LAST BOWEL MOVEMENT: 67381

## 2025-06-26 ASSESSMENT — PATIENT HEALTH QUESTIONNAIRE - PHQ9: CLINICAL INTERPRETATION OF PHQ2 SCORE: 0

## 2025-06-27 ENCOUNTER — HOME CARE VISIT (OUTPATIENT)
Dept: HOME HEALTH SERVICES | Facility: HOME HEALTHCARE | Age: OVER 89
End: 2025-06-27
Payer: MEDICARE

## 2025-06-27 PROCEDURE — 665998 HH PPS REVENUE CREDIT

## 2025-06-27 PROCEDURE — 665999 HH PPS REVENUE DEBIT

## 2025-06-27 NOTE — CASE COMMUNICATION
Rhonda: Please send to Stoney Barajas   For Your Information Only:       Diana Choi called this RN 6/26/25 at around 5:30pm and shared that the edge of her dressing was pulling up. RN advised patient to reinforce dressing and Diana reported she was able to ensure it would stay over night. A dressing change was performed this morning, patient tolerated well. The wound was unchanged from the previous assessment the previous morning  on 6/25/25.     Thank you,     ZEUS Montelongo RN CM.

## 2025-06-28 PROCEDURE — 665999 HH PPS REVENUE DEBIT

## 2025-06-28 PROCEDURE — 665998 HH PPS REVENUE CREDIT

## 2025-06-29 PROCEDURE — 665999 HH PPS REVENUE DEBIT

## 2025-06-29 PROCEDURE — 665998 HH PPS REVENUE CREDIT

## 2025-06-30 ENCOUNTER — HOME CARE VISIT (OUTPATIENT)
Dept: HOME HEALTH SERVICES | Facility: HOME HEALTHCARE | Age: OVER 89
End: 2025-06-30
Payer: MEDICARE

## 2025-06-30 VITALS
RESPIRATION RATE: 16 BRPM | HEART RATE: 85 BPM | DIASTOLIC BLOOD PRESSURE: 58 MMHG | OXYGEN SATURATION: 92 % | SYSTOLIC BLOOD PRESSURE: 112 MMHG | TEMPERATURE: 97.7 F

## 2025-06-30 PROCEDURE — G0299 HHS/HOSPICE OF RN EA 15 MIN: HCPCS

## 2025-06-30 ASSESSMENT — ENCOUNTER SYMPTOMS
LAST BOWEL MOVEMENT: 67386
PAIN LOCATION: RIGHT LEG
PAIN LOCATION - RELIEVING FACTORS: NOT SURE
VOMITING: DENIES
PAIN LOCATION - EXACERBATING FACTORS: BEING UP
BOWEL PATTERN NORMAL: 1
DRY SKIN: 1
STOOL FREQUENCY: DAILY
PAIN LOCATION - PAIN FREQUENCY: INTERMITTENT
LOWEST PAIN SEVERITY IN PAST 24 HOURS: 0/10
DEBILITATING PAIN: 1
PAIN LOCATION - PAIN SEVERITY: 4/10
SUBJECTIVE PAIN PROGRESSION: UNCHANGED
HIGHEST PAIN SEVERITY IN PAST 24 HOURS: 4/10
PAIN: 1
NAUSEA: DENIES
PAIN SEVERITY GOAL: 0/10

## 2025-06-30 ASSESSMENT — PATIENT HEALTH QUESTIONNAIRE - PHQ9: CLINICAL INTERPRETATION OF PHQ2 SCORE: 0

## 2025-06-30 ASSESSMENT — ACTIVITIES OF DAILY LIVING (ADL): AMBULATION ASSISTANCE: STAND BY ASSIST

## 2025-07-03 ENCOUNTER — HOME CARE VISIT (OUTPATIENT)
Dept: HOME HEALTH SERVICES | Facility: HOME HEALTHCARE | Age: OVER 89
End: 2025-07-03
Payer: MEDICARE

## 2025-07-03 VITALS
HEART RATE: 86 BPM | SYSTOLIC BLOOD PRESSURE: 108 MMHG | DIASTOLIC BLOOD PRESSURE: 64 MMHG | RESPIRATION RATE: 16 BRPM | OXYGEN SATURATION: 91 % | TEMPERATURE: 98.5 F

## 2025-07-03 PROCEDURE — G0299 HHS/HOSPICE OF RN EA 15 MIN: HCPCS

## 2025-07-03 ASSESSMENT — ENCOUNTER SYMPTOMS
PAIN LOCATION - PAIN FREQUENCY: INTERMITTENT
BOWEL PATTERN NORMAL: 1
PAIN LOCATION - PAIN SEVERITY: 4/10
STOOL FREQUENCY: DAILY
HIGHEST PAIN SEVERITY IN PAST 24 HOURS: 5/10
PAIN LOCATION - RELIEVING FACTORS: REST
PAIN LOCATION - EXACERBATING FACTORS: WALKING A LOT
MUSCLE WEAKNESS: 1
LAST BOWEL MOVEMENT: 67389
SUBJECTIVE PAIN PROGRESSION: UNCHANGED
DRY SKIN: 1
LOWEST PAIN SEVERITY IN PAST 24 HOURS: 0/10
PAIN LOCATION: RIGHT LEG
PAIN: 1
NAUSEA: DENIES
FATIGUES EASILY: 1
VOMITING: DENIES
PAIN SEVERITY GOAL: 0/10

## 2025-07-03 ASSESSMENT — PATIENT HEALTH QUESTIONNAIRE - PHQ9: CLINICAL INTERPRETATION OF PHQ2 SCORE: 0

## 2025-07-07 ENCOUNTER — HOME CARE VISIT (OUTPATIENT)
Dept: HOME HEALTH SERVICES | Facility: HOME HEALTHCARE | Age: OVER 89
End: 2025-07-07
Payer: MEDICARE

## 2025-07-07 VITALS
HEART RATE: 83 BPM | SYSTOLIC BLOOD PRESSURE: 112 MMHG | DIASTOLIC BLOOD PRESSURE: 58 MMHG | RESPIRATION RATE: 16 BRPM | OXYGEN SATURATION: 91 % | TEMPERATURE: 98 F

## 2025-07-07 PROCEDURE — G0299 HHS/HOSPICE OF RN EA 15 MIN: HCPCS

## 2025-07-07 ASSESSMENT — ENCOUNTER SYMPTOMS
DRY SKIN: 1
LAST BOWEL MOVEMENT: 67393
PAIN LOCATION - RELIEVING FACTORS: REST
FATIGUES EASILY: 1
BOWEL PATTERN NORMAL: 1
PAIN LOCATION - PAIN FREQUENCY: INTERMITTENT
MUSCLE WEAKNESS: 1
HIGHEST PAIN SEVERITY IN PAST 24 HOURS: 5/10
STOOL FREQUENCY: DAILY
PAIN: 1
PAIN LOCATION: RIGHT LEG
VOMITING: DENIES
SUBJECTIVE PAIN PROGRESSION: GRADUALLY IMPROVING
PAIN LOCATION - PAIN SEVERITY: 4/10
PAIN LOCATION - EXACERBATING FACTORS: WALKING A LOT
LOWEST PAIN SEVERITY IN PAST 24 HOURS: 0/10
PAIN SEVERITY GOAL: 0/10

## 2025-07-07 ASSESSMENT — PATIENT HEALTH QUESTIONNAIRE - PHQ9: CLINICAL INTERPRETATION OF PHQ2 SCORE: 0

## 2025-07-08 ENCOUNTER — HOSPITAL ENCOUNTER (OUTPATIENT)
Dept: LAB | Facility: MEDICAL CENTER | Age: OVER 89
End: 2025-07-08
Attending: INTERNAL MEDICINE
Payer: MEDICARE

## 2025-07-08 PROCEDURE — 84443 ASSAY THYROID STIM HORMONE: CPT

## 2025-07-08 PROCEDURE — 84439 ASSAY OF FREE THYROXINE: CPT

## 2025-07-08 PROCEDURE — 84481 FREE ASSAY (FT-3): CPT

## 2025-07-08 PROCEDURE — 82306 VITAMIN D 25 HYDROXY: CPT

## 2025-07-08 PROCEDURE — 36415 COLL VENOUS BLD VENIPUNCTURE: CPT

## 2025-07-08 PROCEDURE — 80053 COMPREHEN METABOLIC PANEL: CPT

## 2025-07-09 LAB
25(OH)D3 SERPL-MCNC: 82 NG/ML (ref 30–100)
ALBUMIN SERPL BCP-MCNC: 4.3 G/DL (ref 3.2–4.9)
ALBUMIN/GLOB SERPL: 1.4 G/DL
ALP SERPL-CCNC: 50 U/L (ref 30–99)
ALT SERPL-CCNC: 12 U/L (ref 2–50)
ANION GAP SERPL CALC-SCNC: 14 MMOL/L (ref 7–16)
AST SERPL-CCNC: 29 U/L (ref 12–45)
BILIRUB SERPL-MCNC: 0.7 MG/DL (ref 0.1–1.5)
BUN SERPL-MCNC: 37 MG/DL (ref 8–22)
CALCIUM ALBUM COR SERPL-MCNC: 10.2 MG/DL (ref 8.5–10.5)
CALCIUM SERPL-MCNC: 10.4 MG/DL (ref 8.5–10.5)
CHLORIDE SERPL-SCNC: 96 MMOL/L (ref 96–112)
CO2 SERPL-SCNC: 26 MMOL/L (ref 20–33)
CREAT SERPL-MCNC: 1.22 MG/DL (ref 0.5–1.4)
GFR SERPLBLD CREATININE-BSD FMLA CKD-EPI: 42 ML/MIN/1.73 M 2
GLOBULIN SER CALC-MCNC: 3.1 G/DL (ref 1.9–3.5)
GLUCOSE SERPL-MCNC: 116 MG/DL (ref 65–99)
POTASSIUM SERPL-SCNC: 4.1 MMOL/L (ref 3.6–5.5)
PROT SERPL-MCNC: 7.4 G/DL (ref 6–8.2)
SODIUM SERPL-SCNC: 136 MMOL/L (ref 135–145)
T3FREE SERPL-MCNC: 2.71 PG/ML (ref 2–4.4)
T4 FREE SERPL-MCNC: 1.32 NG/DL (ref 0.93–1.7)
TSH SERPL-ACNC: 1.18 UIU/ML (ref 0.38–5.33)

## 2025-07-10 ENCOUNTER — HOME CARE VISIT (OUTPATIENT)
Dept: HOME HEALTH SERVICES | Facility: HOME HEALTHCARE | Age: OVER 89
End: 2025-07-10
Payer: MEDICARE

## 2025-07-10 VITALS
SYSTOLIC BLOOD PRESSURE: 122 MMHG | OXYGEN SATURATION: 95 % | RESPIRATION RATE: 16 BRPM | DIASTOLIC BLOOD PRESSURE: 54 MMHG | TEMPERATURE: 99.2 F | HEART RATE: 72 BPM

## 2025-07-10 PROCEDURE — 6650300 HCR  CLEANSER 4-IN-1

## 2025-07-10 PROCEDURE — G0299 HHS/HOSPICE OF RN EA 15 MIN: HCPCS

## 2025-07-10 ASSESSMENT — ENCOUNTER SYMPTOMS
VOMITING: NO
PAIN LOCATION - PAIN SEVERITY: 2/10
LOWEST PAIN SEVERITY IN PAST 24 HOURS: 0/10
PAIN SEVERITY GOAL: 0/10
SUBJECTIVE PAIN PROGRESSION: GRADUALLY IMPROVING
STOOL FREQUENCY: DAILY
PAIN LOCATION - PAIN FREQUENCY: INTERMITTENT
PAIN: 1
NAUSEA: NO
HIGHEST PAIN SEVERITY IN PAST 24 HOURS: 2/10
LOWER EXTREMITY EDEMA: 1
MUSCLE WEAKNESS: 1
PAIN LOCATION: RLE
PAIN LOCATION - PAIN QUALITY: PRESSURE""
LAST BOWEL MOVEMENT: 67395
HYPERTENSION: 1

## 2025-07-14 ENCOUNTER — HOME CARE VISIT (OUTPATIENT)
Dept: HOME HEALTH SERVICES | Facility: HOME HEALTHCARE | Age: OVER 89
End: 2025-07-14
Payer: MEDICARE

## 2025-07-14 VITALS
HEART RATE: 84 BPM | OXYGEN SATURATION: 92 % | DIASTOLIC BLOOD PRESSURE: 56 MMHG | RESPIRATION RATE: 16 BRPM | SYSTOLIC BLOOD PRESSURE: 104 MMHG | TEMPERATURE: 99.1 F

## 2025-07-14 PROCEDURE — G0299 HHS/HOSPICE OF RN EA 15 MIN: HCPCS

## 2025-07-14 ASSESSMENT — ENCOUNTER SYMPTOMS
PAIN SEVERITY GOAL: 0/10
SUBJECTIVE PAIN PROGRESSION: UNCHANGED
HIGHEST PAIN SEVERITY IN PAST 24 HOURS: 5/10
BOWEL PATTERN NORMAL: 1
VOMITING: DENIES
STOOL FREQUENCY: DAILY
PAIN LOCATION - PAIN DURATION: CONTINUOUS
PAIN LOCATION - RELIEVING FACTORS: REST
PAIN: 1
PAIN LOCATION - PAIN SEVERITY: 2/10
PAIN LOCATION - PAIN FREQUENCY: CONSTANT
PAIN LOCATION: RIGHT LEG
LAST BOWEL MOVEMENT: 67399
MUSCLE WEAKNESS: 1
NAUSEA: DENIES
LOWEST PAIN SEVERITY IN PAST 24 HOURS: 0/10
DRY SKIN: 1
PAIN LOCATION - EXACERBATING FACTORS: WALKING

## 2025-07-14 ASSESSMENT — PATIENT HEALTH QUESTIONNAIRE - PHQ9: CLINICAL INTERPRETATION OF PHQ2 SCORE: 0

## 2025-07-17 ENCOUNTER — HOME CARE VISIT (OUTPATIENT)
Dept: HOME HEALTH SERVICES | Facility: HOME HEALTHCARE | Age: OVER 89
End: 2025-07-17
Payer: MEDICARE

## 2025-07-17 VITALS
OXYGEN SATURATION: 91 % | DIASTOLIC BLOOD PRESSURE: 60 MMHG | TEMPERATURE: 99.1 F | HEART RATE: 76 BPM | RESPIRATION RATE: 16 BRPM | SYSTOLIC BLOOD PRESSURE: 124 MMHG

## 2025-07-17 PROCEDURE — G0299 HHS/HOSPICE OF RN EA 15 MIN: HCPCS

## 2025-07-17 ASSESSMENT — ENCOUNTER SYMPTOMS
STOOL FREQUENCY: DAILY
DENIES PAIN: 1
DRY SKIN: 1
LAST BOWEL MOVEMENT: 67403
NAUSEA: PT CURRENTLY DENIES ANY NAUSEA
BOWEL PATTERN NORMAL: 1
VOMITING: PT CURRENTLY DENIES ANY VOMMITING

## 2025-07-17 ASSESSMENT — PATIENT HEALTH QUESTIONNAIRE - PHQ9: CLINICAL INTERPRETATION OF PHQ2 SCORE: 0

## 2025-07-21 ENCOUNTER — HOME CARE VISIT (OUTPATIENT)
Dept: HOME HEALTH SERVICES | Facility: HOME HEALTHCARE | Age: OVER 89
End: 2025-07-21
Payer: MEDICARE

## 2025-07-21 VITALS
RESPIRATION RATE: 16 BRPM | HEART RATE: 71 BPM | OXYGEN SATURATION: 91 % | DIASTOLIC BLOOD PRESSURE: 50 MMHG | TEMPERATURE: 97.5 F | SYSTOLIC BLOOD PRESSURE: 110 MMHG

## 2025-07-21 PROCEDURE — G0299 HHS/HOSPICE OF RN EA 15 MIN: HCPCS

## 2025-07-21 ASSESSMENT — ENCOUNTER SYMPTOMS
FATIGUE: 1
MUSCLE WEAKNESS: 1
PAIN LOCATION - PAIN FREQUENCY: CONSTANT
STOOL FREQUENCY: DAILY
BOWEL PATTERN NORMAL: 1
PAIN LOCATION - PAIN SEVERITY: 2/10
PAIN LOCATION - EXACERBATING FACTORS: WOUND CARE
SUBJECTIVE PAIN PROGRESSION: UNCHANGED
PAIN: 1
LIMITED RANGE OF MOTION: 1
LOWEST PAIN SEVERITY IN PAST 24 HOURS: 0/10
PAIN SEVERITY GOAL: 0/10
PAIN LOCATION - PAIN DURATION: CONSTANT
PAIN LOCATION - PAIN QUALITY: SORE
SKIN LESIONS: 1
LAST BOWEL MOVEMENT: 67407
FATIGUES EASILY: 1
HIGHEST PAIN SEVERITY IN PAST 24 HOURS: 2/10

## 2025-07-24 ENCOUNTER — HOME CARE VISIT (OUTPATIENT)
Dept: HOME HEALTH SERVICES | Facility: HOME HEALTHCARE | Age: OVER 89
End: 2025-07-24
Payer: MEDICARE

## 2025-07-24 VITALS
OXYGEN SATURATION: 93 % | TEMPERATURE: 98.4 F | HEART RATE: 82 BPM | DIASTOLIC BLOOD PRESSURE: 54 MMHG | RESPIRATION RATE: 16 BRPM | SYSTOLIC BLOOD PRESSURE: 102 MMHG

## 2025-07-24 PROCEDURE — G0299 HHS/HOSPICE OF RN EA 15 MIN: HCPCS

## 2025-07-24 ASSESSMENT — ENCOUNTER SYMPTOMS
SUBJECTIVE PAIN PROGRESSION: RAPIDLY IMPROVING
STOOL FREQUENCY: DAILY
PAIN LOCATION: RIGHT LEG
BOWEL PATTERN NORMAL: 1
PAIN: 1
PAIN SEVERITY GOAL: 0/10
NAUSEA: DENIES
PAIN LOCATION - PAIN FREQUENCY: INTERMITTENT
LAST BOWEL MOVEMENT: 67410
FATIGUES EASILY: 1
HIGHEST PAIN SEVERITY IN PAST 24 HOURS: 3/10
VOMITING: DENIES
PAIN LOCATION - RELIEVING FACTORS: REST
MUSCLE WEAKNESS: 1
PAIN LOCATION - PAIN SEVERITY: 2/10
LOWEST PAIN SEVERITY IN PAST 24 HOURS: 0/10
PAIN LOCATION - EXACERBATING FACTORS: WALKING A LOT
DRY SKIN: 1

## 2025-07-24 ASSESSMENT — PATIENT HEALTH QUESTIONNAIRE - PHQ9: CLINICAL INTERPRETATION OF PHQ2 SCORE: 0

## 2025-07-28 ENCOUNTER — HOME CARE VISIT (OUTPATIENT)
Dept: HOME HEALTH SERVICES | Facility: HOME HEALTHCARE | Age: OVER 89
End: 2025-07-28
Payer: MEDICARE

## 2025-07-28 VITALS
SYSTOLIC BLOOD PRESSURE: 108 MMHG | OXYGEN SATURATION: 94 % | RESPIRATION RATE: 18 BRPM | DIASTOLIC BLOOD PRESSURE: 56 MMHG | TEMPERATURE: 97.9 F | HEART RATE: 80 BPM

## 2025-07-28 PROCEDURE — G0299 HHS/HOSPICE OF RN EA 15 MIN: HCPCS

## 2025-07-28 ASSESSMENT — ENCOUNTER SYMPTOMS
PAIN: 1
PAIN LOCATION: RIGHT LEG WOUND
LOWEST PAIN SEVERITY IN PAST 24 HOURS: 0/10
PAIN LOCATION - PAIN QUALITY: DULL ACHE
DRY SKIN: 1
NAUSEA: DENIES
MUSCLE WEAKNESS: 1
SUBJECTIVE PAIN PROGRESSION: GRADUALLY IMPROVING
PAIN LOCATION - EXACERBATING FACTORS: DRESSING CHANGES
LAST BOWEL MOVEMENT: 67414
FATIGUES EASILY: 1
VOMITING: DENIES
HIGHEST PAIN SEVERITY IN PAST 24 HOURS: 3/10
PAIN LOCATION - PAIN FREQUENCY: INTERMITTENT
STOOL FREQUENCY: DAILY
BOWEL PATTERN NORMAL: 1
PAIN LOCATION - RELIEVING FACTORS: REST
PAIN SEVERITY GOAL: 0/10
PAIN LOCATION - PAIN SEVERITY: 1/10

## 2025-07-28 ASSESSMENT — PATIENT HEALTH QUESTIONNAIRE - PHQ9: CLINICAL INTERPRETATION OF PHQ2 SCORE: 0

## 2025-07-31 ENCOUNTER — HOME CARE VISIT (OUTPATIENT)
Dept: HOME HEALTH SERVICES | Facility: HOME HEALTHCARE | Age: OVER 89
End: 2025-07-31
Payer: MEDICARE

## 2025-07-31 VITALS
TEMPERATURE: 98.5 F | SYSTOLIC BLOOD PRESSURE: 106 MMHG | OXYGEN SATURATION: 92 % | DIASTOLIC BLOOD PRESSURE: 66 MMHG | HEART RATE: 84 BPM | RESPIRATION RATE: 84 BRPM

## 2025-07-31 PROCEDURE — G0299 HHS/HOSPICE OF RN EA 15 MIN: HCPCS

## 2025-07-31 ASSESSMENT — ENCOUNTER SYMPTOMS
DRY SKIN: 1
LAST BOWEL MOVEMENT: 67417
NAUSEA: DENIES
BOWEL PATTERN NORMAL: 1
VOMITING: DENIES
STOOL FREQUENCY: DAILY
MUSCLE WEAKNESS: 1
DENIES PAIN: 1
FATIGUES EASILY: 1

## 2025-07-31 ASSESSMENT — PATIENT HEALTH QUESTIONNAIRE - PHQ9: CLINICAL INTERPRETATION OF PHQ2 SCORE: 0

## 2025-08-04 ENCOUNTER — HOME CARE VISIT (OUTPATIENT)
Dept: HOME HEALTH SERVICES | Facility: HOME HEALTHCARE | Age: OVER 89
End: 2025-08-04
Payer: MEDICARE

## 2025-08-04 VITALS
TEMPERATURE: 98.9 F | RESPIRATION RATE: 16 BRPM | SYSTOLIC BLOOD PRESSURE: 102 MMHG | OXYGEN SATURATION: 93 % | DIASTOLIC BLOOD PRESSURE: 58 MMHG | HEART RATE: 92 BPM

## 2025-08-04 PROCEDURE — G0299 HHS/HOSPICE OF RN EA 15 MIN: HCPCS

## 2025-08-04 ASSESSMENT — ENCOUNTER SYMPTOMS
FATIGUES EASILY: 1
DRY SKIN: 1
BOWEL PATTERN NORMAL: 1
STOOL FREQUENCY: DAILY
LAST BOWEL MOVEMENT: 67421
VOMITING: DENIES
NAUSEA: DENIES
MUSCLE WEAKNESS: 1
DENIES PAIN: 1

## 2025-08-04 ASSESSMENT — PATIENT HEALTH QUESTIONNAIRE - PHQ9: CLINICAL INTERPRETATION OF PHQ2 SCORE: 0

## 2025-08-06 ENCOUNTER — HOME CARE VISIT (OUTPATIENT)
Dept: HOME HEALTH SERVICES | Facility: HOME HEALTHCARE | Age: OVER 89
End: 2025-08-06
Payer: MEDICARE

## 2025-08-07 ENCOUNTER — HOME CARE VISIT (OUTPATIENT)
Dept: HOME HEALTH SERVICES | Facility: HOME HEALTHCARE | Age: OVER 89
End: 2025-08-07
Payer: MEDICARE

## 2025-08-07 ENCOUNTER — DOCUMENTATION (OUTPATIENT)
Dept: MEDICAL GROUP | Facility: PHYSICIAN GROUP | Age: OVER 89
End: 2025-08-07
Payer: MEDICARE

## 2025-08-07 VITALS
DIASTOLIC BLOOD PRESSURE: 68 MMHG | OXYGEN SATURATION: 92 % | SYSTOLIC BLOOD PRESSURE: 110 MMHG | TEMPERATURE: 98.5 F | RESPIRATION RATE: 16 BRPM | HEART RATE: 82 BPM

## 2025-08-07 PROCEDURE — G0299 HHS/HOSPICE OF RN EA 15 MIN: HCPCS

## 2025-08-07 ASSESSMENT — ENCOUNTER SYMPTOMS
DENIES PAIN: 1
FATIGUES EASILY: 1
DRY SKIN: 1
NAUSEA: DENIES
VOMITING: DENIES

## 2025-08-07 ASSESSMENT — PATIENT HEALTH QUESTIONNAIRE - PHQ9: CLINICAL INTERPRETATION OF PHQ2 SCORE: 0

## 2025-08-07 ASSESSMENT — ACTIVITIES OF DAILY LIVING (ADL): OASIS_M1830: 01

## 2025-08-11 ENCOUNTER — HOME CARE VISIT (OUTPATIENT)
Dept: HOME HEALTH SERVICES | Facility: HOME HEALTHCARE | Age: OVER 89
End: 2025-08-11
Payer: MEDICARE

## 2025-08-11 VITALS
TEMPERATURE: 97.9 F | SYSTOLIC BLOOD PRESSURE: 114 MMHG | DIASTOLIC BLOOD PRESSURE: 76 MMHG | OXYGEN SATURATION: 93 % | HEART RATE: 84 BPM | RESPIRATION RATE: 16 BRPM

## 2025-08-11 PROCEDURE — 665998 HH PPS REVENUE CREDIT

## 2025-08-11 PROCEDURE — 665003 FOLLOW UP-HOME HEALTH

## 2025-08-11 PROCEDURE — G0299 HHS/HOSPICE OF RN EA 15 MIN: HCPCS

## 2025-08-11 PROCEDURE — 665999 HH PPS REVENUE DEBIT

## 2025-08-11 ASSESSMENT — ENCOUNTER SYMPTOMS
NAUSEA: DENIES
FATIGUES EASILY: 1
VOMITING: DENIES
DENIES PAIN: 1
LAST BOWEL MOVEMENT: 67428
BOWEL PATTERN NORMAL: 1
DRY SKIN: 1
MUSCLE WEAKNESS: 1
STOOL FREQUENCY: DAILY

## 2025-08-11 ASSESSMENT — PATIENT HEALTH QUESTIONNAIRE - PHQ9: CLINICAL INTERPRETATION OF PHQ2 SCORE: 0

## 2025-08-12 PROCEDURE — 665998 HH PPS REVENUE CREDIT

## 2025-08-12 PROCEDURE — 665999 HH PPS REVENUE DEBIT

## 2025-08-13 PROCEDURE — 665999 HH PPS REVENUE DEBIT

## 2025-08-13 PROCEDURE — 665998 HH PPS REVENUE CREDIT

## 2025-08-14 ENCOUNTER — HOME CARE VISIT (OUTPATIENT)
Dept: HOME HEALTH SERVICES | Facility: HOME HEALTHCARE | Age: OVER 89
End: 2025-08-14
Payer: MEDICARE

## 2025-08-14 VITALS
DIASTOLIC BLOOD PRESSURE: 62 MMHG | SYSTOLIC BLOOD PRESSURE: 128 MMHG | HEART RATE: 77 BPM | RESPIRATION RATE: 16 BRPM | OXYGEN SATURATION: 92 % | TEMPERATURE: 98.9 F

## 2025-08-14 PROCEDURE — 665998 HH PPS REVENUE CREDIT

## 2025-08-14 PROCEDURE — G0299 HHS/HOSPICE OF RN EA 15 MIN: HCPCS

## 2025-08-14 PROCEDURE — 665999 HH PPS REVENUE DEBIT

## 2025-08-14 ASSESSMENT — ENCOUNTER SYMPTOMS
STOOL FREQUENCY: DAILY
FATIGUES EASILY: 1
LAST BOWEL MOVEMENT: 67430
BOWEL PATTERN NORMAL: 1
DRY SKIN: 1
DENIES PAIN: 1

## 2025-08-14 ASSESSMENT — PATIENT HEALTH QUESTIONNAIRE - PHQ9: CLINICAL INTERPRETATION OF PHQ2 SCORE: 0

## 2025-08-15 PROCEDURE — 665998 HH PPS REVENUE CREDIT

## 2025-08-15 PROCEDURE — 665999 HH PPS REVENUE DEBIT

## 2025-08-16 PROCEDURE — 665998 HH PPS REVENUE CREDIT

## 2025-08-16 PROCEDURE — 665999 HH PPS REVENUE DEBIT

## 2025-08-17 PROCEDURE — 665999 HH PPS REVENUE DEBIT

## 2025-08-17 PROCEDURE — 665998 HH PPS REVENUE CREDIT

## 2025-08-18 ENCOUNTER — HOME CARE VISIT (OUTPATIENT)
Dept: HOME HEALTH SERVICES | Facility: HOME HEALTHCARE | Age: OVER 89
End: 2025-08-18
Payer: MEDICARE

## 2025-08-18 VITALS
DIASTOLIC BLOOD PRESSURE: 78 MMHG | OXYGEN SATURATION: 92 % | HEART RATE: 82 BPM | TEMPERATURE: 98.3 F | RESPIRATION RATE: 16 BRPM | SYSTOLIC BLOOD PRESSURE: 112 MMHG

## 2025-08-18 PROCEDURE — G0299 HHS/HOSPICE OF RN EA 15 MIN: HCPCS

## 2025-08-18 PROCEDURE — 665999 HH PPS REVENUE DEBIT

## 2025-08-18 PROCEDURE — 665998 HH PPS REVENUE CREDIT

## 2025-08-18 ASSESSMENT — ENCOUNTER SYMPTOMS
VOMITING: DENIES
DRY SKIN: 1
NAUSEA: DENIES
DENIES PAIN: 1
MUSCLE WEAKNESS: 1
LAST BOWEL MOVEMENT: 67435
FATIGUES EASILY: 1
STOOL FREQUENCY: DAILY
BOWEL PATTERN NORMAL: 1

## 2025-08-18 ASSESSMENT — PATIENT HEALTH QUESTIONNAIRE - PHQ9: CLINICAL INTERPRETATION OF PHQ2 SCORE: 0

## 2025-08-19 PROCEDURE — 665998 HH PPS REVENUE CREDIT

## 2025-08-19 PROCEDURE — 665999 HH PPS REVENUE DEBIT

## 2025-08-20 PROCEDURE — 665998 HH PPS REVENUE CREDIT

## 2025-08-20 PROCEDURE — 665999 HH PPS REVENUE DEBIT

## 2025-08-21 ENCOUNTER — HOME CARE VISIT (OUTPATIENT)
Dept: HOME HEALTH SERVICES | Facility: HOME HEALTHCARE | Age: OVER 89
End: 2025-08-21
Payer: MEDICARE

## 2025-08-21 VITALS
RESPIRATION RATE: 16 BRPM | SYSTOLIC BLOOD PRESSURE: 112 MMHG | TEMPERATURE: 98.9 F | OXYGEN SATURATION: 91 % | HEART RATE: 88 BPM | DIASTOLIC BLOOD PRESSURE: 56 MMHG

## 2025-08-21 PROCEDURE — G0299 HHS/HOSPICE OF RN EA 15 MIN: HCPCS

## 2025-08-21 PROCEDURE — 665998 HH PPS REVENUE CREDIT

## 2025-08-21 PROCEDURE — 665999 HH PPS REVENUE DEBIT

## 2025-08-21 ASSESSMENT — ENCOUNTER SYMPTOMS
BOWEL PATTERN NORMAL: 1
DENIES PAIN: 1
STOOL FREQUENCY: DAILY
LAST BOWEL MOVEMENT: 67437

## 2025-08-21 ASSESSMENT — PATIENT HEALTH QUESTIONNAIRE - PHQ9: CLINICAL INTERPRETATION OF PHQ2 SCORE: 0

## 2025-08-22 PROCEDURE — 665998 HH PPS REVENUE CREDIT

## 2025-08-22 PROCEDURE — 665999 HH PPS REVENUE DEBIT

## 2025-08-23 PROCEDURE — 665999 HH PPS REVENUE DEBIT

## 2025-08-23 PROCEDURE — 665998 HH PPS REVENUE CREDIT

## 2025-08-24 PROCEDURE — 665998 HH PPS REVENUE CREDIT

## 2025-08-24 PROCEDURE — 665999 HH PPS REVENUE DEBIT

## 2025-08-25 ENCOUNTER — HOME CARE VISIT (OUTPATIENT)
Dept: HOME HEALTH SERVICES | Facility: HOME HEALTHCARE | Age: OVER 89
End: 2025-08-25
Payer: MEDICARE

## 2025-08-25 VITALS
RESPIRATION RATE: 16 BRPM | HEART RATE: 88 BPM | DIASTOLIC BLOOD PRESSURE: 50 MMHG | OXYGEN SATURATION: 91 % | TEMPERATURE: 99.6 F | SYSTOLIC BLOOD PRESSURE: 112 MMHG

## 2025-08-25 PROCEDURE — G0299 HHS/HOSPICE OF RN EA 15 MIN: HCPCS

## 2025-08-25 PROCEDURE — 665998 HH PPS REVENUE CREDIT

## 2025-08-25 PROCEDURE — 665999 HH PPS REVENUE DEBIT

## 2025-08-25 SDOH — ECONOMIC STABILITY: FOOD INSECURITY: MEALS PER DAY: 3

## 2025-08-25 ASSESSMENT — ENCOUNTER SYMPTOMS
BOWEL PATTERN NORMAL: 1
LAST BOWEL MOVEMENT: 67441
DENIES PAIN: 1
VOMITING: PT CURRENTLY DENIES ANY VOMMITING
NAUSEA: PT CURRENTLY DENIES ANY NAUSEA
CHANGE IN APPETITE: UNCHANGED
DRY SKIN: 1
FATIGUES EASILY: 1
STOOL FREQUENCY: DAILY
APPETITE LEVEL: GOOD

## 2025-08-25 ASSESSMENT — ACTIVITIES OF DAILY LIVING (ADL)
OASIS_M1830: 01
HOME_HEALTH_OASIS: 00

## 2025-08-26 PROCEDURE — 665999 HH PPS REVENUE DEBIT

## 2025-08-26 PROCEDURE — 665998 HH PPS REVENUE CREDIT

## 2025-08-27 PROCEDURE — 665999 HH PPS REVENUE DEBIT

## 2025-08-27 PROCEDURE — 665998 HH PPS REVENUE CREDIT

## 2025-08-28 PROCEDURE — 665998 HH PPS REVENUE CREDIT

## 2025-08-28 PROCEDURE — 665999 HH PPS REVENUE DEBIT

## 2025-08-29 PROCEDURE — 665998 HH PPS REVENUE CREDIT

## 2025-08-29 PROCEDURE — 665999 HH PPS REVENUE DEBIT

## 2025-08-30 PROCEDURE — 665999 HH PPS REVENUE DEBIT

## 2025-08-30 PROCEDURE — 665998 HH PPS REVENUE CREDIT
